# Patient Record
Sex: FEMALE | Race: WHITE | NOT HISPANIC OR LATINO | Employment: FULL TIME | URBAN - METROPOLITAN AREA
[De-identification: names, ages, dates, MRNs, and addresses within clinical notes are randomized per-mention and may not be internally consistent; named-entity substitution may affect disease eponyms.]

---

## 2023-03-09 DIAGNOSIS — I10 PRIMARY HYPERTENSION: Primary | ICD-10-CM

## 2023-03-09 RX ORDER — MECLIZINE HYDROCHLORIDE 25 MG/1
25 TABLET ORAL 2 TIMES DAILY PRN
COMMUNITY
End: 2023-10-04 | Stop reason: ALTCHOICE

## 2023-03-09 RX ORDER — MECLIZINE HCL 12.5 MG 12.5 MG/1
TABLET ORAL
COMMUNITY
Start: 2022-11-11

## 2023-03-09 RX ORDER — SITAGLIPTIN 25 MG/1
25 TABLET, FILM COATED ORAL DAILY
COMMUNITY
End: 2023-04-28

## 2023-03-09 RX ORDER — ESTRADIOL 0.1 MG/G
CREAM VAGINAL
COMMUNITY
End: 2023-05-25 | Stop reason: SDUPTHER

## 2023-03-09 RX ORDER — ATENOLOL 25 MG/1
25 TABLET ORAL 2 TIMES DAILY
Qty: 180 TABLET | Refills: 0 | Status: SHIPPED | OUTPATIENT
Start: 2023-03-09 | End: 2023-08-21 | Stop reason: SDUPTHER

## 2023-03-09 RX ORDER — PREDNISONE 20 MG/1
TABLET ORAL
COMMUNITY
Start: 2022-03-11 | End: 2023-04-28 | Stop reason: ALTCHOICE

## 2023-03-09 RX ORDER — PREDNISONE 10 MG/1
TABLET ORAL
COMMUNITY
Start: 2022-06-24 | End: 2023-04-28 | Stop reason: ALTCHOICE

## 2023-03-09 RX ORDER — DIAZEPAM 5 MG/1
1 TABLET ORAL
COMMUNITY
Start: 2022-06-20 | End: 2023-04-28 | Stop reason: ALTCHOICE

## 2023-03-09 RX ORDER — NYSTATIN AND TRIAMCINOLONE ACETONIDE 100000; 1 [USP'U]/G; MG/G
1 CREAM TOPICAL 3 TIMES DAILY PRN
COMMUNITY
Start: 2015-02-16 | End: 2023-11-16 | Stop reason: SDUPTHER

## 2023-03-09 RX ORDER — BLOOD SUGAR DIAGNOSTIC
STRIP MISCELLANEOUS 2 TIMES DAILY
COMMUNITY
Start: 2020-11-11

## 2023-03-09 RX ORDER — GLYBURIDE 5 MG/1
10 TABLET ORAL 2 TIMES DAILY
COMMUNITY
End: 2023-08-21 | Stop reason: SDUPTHER

## 2023-03-09 RX ORDER — LISINOPRIL 20 MG/1
20 TABLET ORAL DAILY
COMMUNITY
End: 2023-08-22 | Stop reason: SDUPTHER

## 2023-03-09 RX ORDER — TRIAMTERENE/HYDROCHLOROTHIAZID 37.5-25 MG
1 TABLET ORAL DAILY
COMMUNITY
End: 2023-08-21 | Stop reason: SDUPTHER

## 2023-03-09 RX ORDER — METFORMIN HYDROCHLORIDE 500 MG/1
1000 TABLET ORAL 2 TIMES DAILY
COMMUNITY
End: 2023-11-10 | Stop reason: SDUPTHER

## 2023-03-09 RX ORDER — LORAZEPAM 1 MG/1
TABLET ORAL
COMMUNITY
Start: 2021-03-05 | End: 2023-04-28 | Stop reason: SDUPTHER

## 2023-03-09 RX ORDER — ONDANSETRON 4 MG/1
1 TABLET, FILM COATED ORAL EVERY 8 HOURS PRN
COMMUNITY
Start: 2022-11-11 | End: 2024-02-19 | Stop reason: ALTCHOICE

## 2023-03-09 RX ORDER — LEVOTHYROXINE SODIUM 50 UG/1
50 TABLET ORAL DAILY
COMMUNITY
End: 2023-06-22 | Stop reason: SDUPTHER

## 2023-03-09 RX ORDER — CLOBETASOL PROPIONATE 0.5 MG/G
EMULSION TOPICAL
COMMUNITY
Start: 2019-11-05 | End: 2023-10-17 | Stop reason: SDUPTHER

## 2023-03-09 RX ORDER — SCOLOPAMINE TRANSDERMAL SYSTEM 1 MG/1
PATCH, EXTENDED RELEASE TRANSDERMAL
COMMUNITY
Start: 2015-09-01 | End: 2023-10-04 | Stop reason: ALTCHOICE

## 2023-03-09 RX ORDER — ISOPROPYL ALCOHOL 70 ML/100ML
SWAB TOPICAL
COMMUNITY
Start: 2022-12-30 | End: 2023-06-22 | Stop reason: SDUPTHER

## 2023-03-09 RX ORDER — TALC
1 POWDER (GRAM) TOPICAL 2 TIMES DAILY
COMMUNITY
Start: 2022-04-07

## 2023-03-09 RX ORDER — FLUTICASONE PROPIONATE 50 MCG
2 SPRAY, SUSPENSION (ML) NASAL DAILY PRN
COMMUNITY
Start: 2020-11-11

## 2023-03-09 RX ORDER — ATENOLOL 25 MG/1
25 TABLET ORAL 2 TIMES DAILY
COMMUNITY
End: 2023-03-09 | Stop reason: SDUPTHER

## 2023-03-09 RX ORDER — SYRINGE WITH NEEDLE, 1 ML 25GX5/8"
SYRINGE, EMPTY DISPOSABLE MISCELLANEOUS
COMMUNITY
Start: 2023-01-18 | End: 2023-04-18 | Stop reason: SDUPTHER

## 2023-03-09 RX ORDER — CYANOCOBALAMIN 1000 UG/ML
INJECTION INTRAMUSCULAR; SUBCUTANEOUS
COMMUNITY
End: 2023-04-28 | Stop reason: ALTCHOICE

## 2023-03-09 RX ORDER — BLOOD-GLUCOSE METER
EACH MISCELLANEOUS
COMMUNITY
Start: 2016-09-08 | End: 2023-03-27 | Stop reason: SDUPTHER

## 2023-03-09 RX ORDER — ERGOCALCIFEROL 1.25 MG/1
1 CAPSULE ORAL
COMMUNITY
End: 2023-04-25 | Stop reason: SDUPTHER

## 2023-03-09 RX ORDER — LANCETS 33 GAUGE
EACH MISCELLANEOUS
COMMUNITY
Start: 2022-12-30 | End: 2023-03-27 | Stop reason: SDUPTHER

## 2023-03-09 RX ORDER — CYCLOSPORINE 0.5 MG/ML
EMULSION OPHTHALMIC EVERY 12 HOURS
COMMUNITY
Start: 2019-02-18 | End: 2023-10-04 | Stop reason: ALTCHOICE

## 2023-03-27 DIAGNOSIS — E11.69 TYPE 2 DIABETES MELLITUS WITH OTHER SPECIFIED COMPLICATION, UNSPECIFIED WHETHER LONG TERM INSULIN USE (MULTI): Primary | ICD-10-CM

## 2023-03-27 RX ORDER — BLOOD-GLUCOSE METER
EACH MISCELLANEOUS
Qty: 200 STRIP | Refills: 3 | Status: SHIPPED | OUTPATIENT
Start: 2023-03-27 | End: 2024-03-27 | Stop reason: SDUPTHER

## 2023-03-27 RX ORDER — LANCETS 33 GAUGE
EACH MISCELLANEOUS
Qty: 200 EACH | Refills: 3 | Status: SHIPPED | OUTPATIENT
Start: 2023-03-27 | End: 2024-03-25 | Stop reason: SDUPTHER

## 2023-04-18 DIAGNOSIS — E53.8 VITAMIN B12 DEFICIENCY: ICD-10-CM

## 2023-04-18 RX ORDER — SYRINGE WITH NEEDLE, 1 ML 25GX5/8"
SYRINGE, EMPTY DISPOSABLE MISCELLANEOUS
Qty: 4 EACH | Refills: 0 | Status: SHIPPED | OUTPATIENT
Start: 2023-04-18 | End: 2023-10-06 | Stop reason: SDUPTHER

## 2023-04-25 DIAGNOSIS — R79.89 LOW VITAMIN D LEVEL: ICD-10-CM

## 2023-04-25 RX ORDER — ERGOCALCIFEROL 1.25 MG/1
1 CAPSULE ORAL
Qty: 4 CAPSULE | Refills: 0 | Status: SHIPPED | OUTPATIENT
Start: 2023-04-25 | End: 2023-06-28 | Stop reason: SDUPTHER

## 2023-04-28 ENCOUNTER — OFFICE VISIT (OUTPATIENT)
Dept: PRIMARY CARE | Facility: CLINIC | Age: 63
End: 2023-04-28
Payer: COMMERCIAL

## 2023-04-28 ENCOUNTER — LAB (OUTPATIENT)
Dept: LAB | Facility: LAB | Age: 63
End: 2023-04-28
Payer: COMMERCIAL

## 2023-04-28 VITALS
WEIGHT: 231 LBS | BODY MASS INDEX: 38.49 KG/M2 | TEMPERATURE: 97.8 F | DIASTOLIC BLOOD PRESSURE: 90 MMHG | HEIGHT: 65 IN | RESPIRATION RATE: 16 BRPM | SYSTOLIC BLOOD PRESSURE: 158 MMHG

## 2023-04-28 DIAGNOSIS — E03.9 ACQUIRED HYPOTHYROIDISM: ICD-10-CM

## 2023-04-28 DIAGNOSIS — F40.243 FEAR OF FLYING: ICD-10-CM

## 2023-04-28 DIAGNOSIS — Z00.00 ANNUAL PHYSICAL EXAM: ICD-10-CM

## 2023-04-28 DIAGNOSIS — Z00.00 HEALTH MAINTENANCE EXAMINATION: ICD-10-CM

## 2023-04-28 DIAGNOSIS — E55.9 VITAMIN D DEFICIENCY: ICD-10-CM

## 2023-04-28 DIAGNOSIS — E11.9 TYPE 2 DIABETES MELLITUS WITHOUT COMPLICATION, WITHOUT LONG-TERM CURRENT USE OF INSULIN (MULTI): ICD-10-CM

## 2023-04-28 DIAGNOSIS — E78.49 OTHER HYPERLIPIDEMIA: ICD-10-CM

## 2023-04-28 DIAGNOSIS — Z12.31 OTHER SCREENING MAMMOGRAM: ICD-10-CM

## 2023-04-28 DIAGNOSIS — E53.8 VITAMIN B12 DEFICIENCY: ICD-10-CM

## 2023-04-28 DIAGNOSIS — T75.3XXA SEA SICKNESS, INITIAL ENCOUNTER: ICD-10-CM

## 2023-04-28 DIAGNOSIS — E66.09 CLASS 2 OBESITY DUE TO EXCESS CALORIES WITHOUT SERIOUS COMORBIDITY WITH BODY MASS INDEX (BMI) OF 39.0 TO 39.9 IN ADULT: ICD-10-CM

## 2023-04-28 DIAGNOSIS — I10 PRIMARY HYPERTENSION: ICD-10-CM

## 2023-04-28 DIAGNOSIS — Z00.00 ANNUAL PHYSICAL EXAM: Primary | ICD-10-CM

## 2023-04-28 PROBLEM — K76.0 STEATOSIS OF LIVER: Status: ACTIVE | Noted: 2023-04-28

## 2023-04-28 LAB
ALANINE AMINOTRANSFERASE (SGPT) (U/L) IN SER/PLAS: 23 U/L (ref 7–45)
ALBUMIN (G/DL) IN SER/PLAS: 4.1 G/DL (ref 3.4–5)
ALKALINE PHOSPHATASE (U/L) IN SER/PLAS: 70 U/L (ref 33–136)
ANION GAP IN SER/PLAS: 12 MMOL/L (ref 10–20)
ASPARTATE AMINOTRANSFERASE (SGOT) (U/L) IN SER/PLAS: 17 U/L (ref 9–39)
BILIRUBIN TOTAL (MG/DL) IN SER/PLAS: 0.4 MG/DL (ref 0–1.2)
CALCIDIOL (25 OH VITAMIN D3) (NG/ML) IN SER/PLAS: 35 NG/ML
CALCIUM (MG/DL) IN SER/PLAS: 10.5 MG/DL (ref 8.6–10.3)
CARBON DIOXIDE, TOTAL (MMOL/L) IN SER/PLAS: 30 MMOL/L (ref 21–32)
CHLORIDE (MMOL/L) IN SER/PLAS: 102 MMOL/L (ref 98–107)
CHOLESTEROL (MG/DL) IN SER/PLAS: 211 MG/DL (ref 0–199)
CHOLESTEROL IN HDL (MG/DL) IN SER/PLAS: 36.1 MG/DL
CHOLESTEROL/HDL RATIO: 5.8
CREATININE (MG/DL) IN SER/PLAS: 0.87 MG/DL (ref 0.5–1.05)
ESTIMATED AVERAGE GLUCOSE FOR HBA1C: 197 MG/DL
GFR FEMALE: 74 ML/MIN/1.73M2
GLUCOSE (MG/DL) IN SER/PLAS: 165 MG/DL (ref 74–99)
HEMOGLOBIN A1C/HEMOGLOBIN TOTAL IN BLOOD: 8.5 %
LDL: 108 MG/DL (ref 0–99)
NON HDL CHOLESTEROL: 175 MG/DL
POTASSIUM (MMOL/L) IN SER/PLAS: 3.8 MMOL/L (ref 3.5–5.3)
PROTEIN TOTAL: 6.7 G/DL (ref 6.4–8.2)
SODIUM (MMOL/L) IN SER/PLAS: 140 MMOL/L (ref 136–145)
THYROTROPIN (MIU/L) IN SER/PLAS BY DETECTION LIMIT <= 0.05 MIU/L: 1.5 MIU/L (ref 0.44–3.98)
TRIGLYCERIDE (MG/DL) IN SER/PLAS: 335 MG/DL (ref 0–149)
UREA NITROGEN (MG/DL) IN SER/PLAS: 21 MG/DL (ref 6–23)
VLDL: 67 MG/DL (ref 0–40)

## 2023-04-28 PROCEDURE — 80053 COMPREHEN METABOLIC PANEL: CPT

## 2023-04-28 PROCEDURE — 1036F TOBACCO NON-USER: CPT | Performed by: INTERNAL MEDICINE

## 2023-04-28 PROCEDURE — 82306 VITAMIN D 25 HYDROXY: CPT

## 2023-04-28 PROCEDURE — 4010F ACE/ARB THERAPY RXD/TAKEN: CPT | Performed by: INTERNAL MEDICINE

## 2023-04-28 PROCEDURE — 3077F SYST BP >= 140 MM HG: CPT | Performed by: INTERNAL MEDICINE

## 2023-04-28 PROCEDURE — 80061 LIPID PANEL: CPT

## 2023-04-28 PROCEDURE — 3080F DIAST BP >= 90 MM HG: CPT | Performed by: INTERNAL MEDICINE

## 2023-04-28 PROCEDURE — 3052F HG A1C>EQUAL 8.0%<EQUAL 9.0%: CPT | Performed by: INTERNAL MEDICINE

## 2023-04-28 PROCEDURE — 93000 ELECTROCARDIOGRAM COMPLETE: CPT | Performed by: INTERNAL MEDICINE

## 2023-04-28 PROCEDURE — 83036 HEMOGLOBIN GLYCOSYLATED A1C: CPT

## 2023-04-28 PROCEDURE — 3008F BODY MASS INDEX DOCD: CPT | Performed by: INTERNAL MEDICINE

## 2023-04-28 PROCEDURE — 99396 PREV VISIT EST AGE 40-64: CPT | Performed by: INTERNAL MEDICINE

## 2023-04-28 PROCEDURE — 84443 ASSAY THYROID STIM HORMONE: CPT

## 2023-04-28 PROCEDURE — 36415 COLL VENOUS BLD VENIPUNCTURE: CPT

## 2023-04-28 PROCEDURE — 99214 OFFICE O/P EST MOD 30 MIN: CPT | Performed by: INTERNAL MEDICINE

## 2023-04-28 RX ORDER — CYANOCOBALAMIN 1000 UG/ML
INJECTION, SOLUTION INTRAMUSCULAR; SUBCUTANEOUS
Qty: 4 ML | Refills: 1 | Status: SHIPPED | OUTPATIENT
Start: 2023-04-28 | End: 2023-10-06 | Stop reason: SDUPTHER

## 2023-04-28 RX ORDER — SCOLOPAMINE TRANSDERMAL SYSTEM 1 MG/1
1 PATCH, EXTENDED RELEASE TRANSDERMAL
Qty: 4 PATCH | Refills: 0 | Status: SHIPPED | OUTPATIENT
Start: 2023-04-28 | End: 2023-06-27

## 2023-04-28 RX ORDER — LORAZEPAM 1 MG/1
TABLET ORAL
Qty: 2 TABLET | Refills: 0 | Status: SHIPPED | OUTPATIENT
Start: 2023-04-28 | End: 2023-10-04 | Stop reason: ALTCHOICE

## 2023-04-28 ASSESSMENT — PATIENT HEALTH QUESTIONNAIRE - PHQ9
2. FEELING DOWN, DEPRESSED OR HOPELESS: NOT AT ALL
SUM OF ALL RESPONSES TO PHQ9 QUESTIONS 1 AND 2: 0
1. LITTLE INTEREST OR PLEASURE IN DOING THINGS: NOT AT ALL

## 2023-04-28 NOTE — PATIENT INSTRUCTIONS
I spent >15 minutes face to face with individual providing recommendations for nutrition choices and exercise plan to help achieve weight reduction.Go for labs today.  Reduce Metformin down to 2 tab daily,monitor diarrhea,if resolved,most likely Metformin is the cause of your diarrhea.  Start Ozempioc.  Increase Lisinopril up to 1 tab BID.  Refer to our pharmacist for HTN and diabetes management.  Follow up with me in 3 months,will do A1C at our office next visit.

## 2023-04-28 NOTE — PROGRESS NOTES
"Subjective   Patient ID: Shwetha Madrigal is a 63 y.o. female who presents for Annual Exam.    Here for CPE.  She is s/p hysterectomy.  She had covid and has had chronic dizziness,was admitted to [facility],seen by ENT,had vestibular PT,still with balance problems,using a cane for ambulation to prevent fall.  She saw allergist ,referred by [facility],and saw Dr Nunze  [phone]  records reviewed.  she continue to be seen at [facility] .  she has HTN,HLD,DM-2,fatty liver  in past she saw Dr Sierra for abnormal LFT and had fibroscan,had fatty liver,no cirrhosis.next scan in 1 year .  she denies any CP,SOB.  she has h/o tubular adenoma and had colonoscopy by Dr Phipps in 1/21,next in 3 years in 1/2024.  She has not been checking her home BP.  no CP,SOB.  She is going on a cruise,need scopolamine patches.  She is flying and needs 2 tablet Lorazepam.  She gets occasional palpitation.  She has chronic  diarrhea,colonoscopy reviewed. It also could be due to Metfromin use.  Has L cataract,Dr Coburn did eye exam 3/2023,was referred to [facility] by him.                Review of Systems   Reason unable to perform ROS: as HPI.       Objective   /90   Temp 36.6 °C (97.8 °F)   Resp 16   Ht 1.638 m (5' 4.5\")   Wt 105 kg (231 lb)   BMI 39.04 kg/m²     Physical Exam  Constitutional:       Appearance: Normal appearance. She is obese.   HENT:      Head: Normocephalic and atraumatic.      Right Ear: Tympanic membrane, ear canal and external ear normal. There is no impacted cerumen.      Left Ear: Tympanic membrane, ear canal and external ear normal. There is no impacted cerumen.   Eyes:      Extraocular Movements: Extraocular movements intact.      Pupils: Pupils are equal, round, and reactive to light.   Cardiovascular:      Rate and Rhythm: Normal rate and regular rhythm.      Pulses: Normal pulses.      Heart sounds: Normal heart sounds.   Pulmonary:      Effort: Pulmonary effort is normal.      " Breath sounds: Normal breath sounds. No wheezing or rhonchi.   Chest:   Breasts:     Right: Normal. No inverted nipple, mass or skin change.      Left: Normal. No inverted nipple, mass or skin change.   Abdominal:      General: Abdomen is flat. Bowel sounds are normal. There is no distension.      Palpations: Abdomen is soft.   Musculoskeletal:         General: Normal range of motion.      Cervical back: Normal range of motion and neck supple.      Left lower leg: No edema.   Lymphadenopathy:      Upper Body:      Right upper body: No supraclavicular or axillary adenopathy.      Left upper body: No supraclavicular or axillary adenopathy.   Skin:     General: Skin is warm.   Neurological:      General: No focal deficit present.      Mental Status: She is alert and oriented to person, place, and time.   Psychiatric:         Behavior: Behavior normal.      Comments: Concerned,worried about her balance and vertigo.         Assessment/Plan

## 2023-04-29 PROBLEM — E66.09 CLASS 2 OBESITY DUE TO EXCESS CALORIES WITHOUT SERIOUS COMORBIDITY WITH BODY MASS INDEX (BMI) OF 39.0 TO 39.9 IN ADULT: Status: ACTIVE | Noted: 2023-04-29

## 2023-04-29 PROBLEM — E78.49 OTHER HYPERLIPIDEMIA: Status: ACTIVE | Noted: 2023-04-29

## 2023-04-29 PROBLEM — E55.9 VITAMIN D DEFICIENCY: Status: ACTIVE | Noted: 2023-04-29

## 2023-04-29 PROBLEM — E66.812 CLASS 2 OBESITY DUE TO EXCESS CALORIES WITHOUT SERIOUS COMORBIDITY WITH BODY MASS INDEX (BMI) OF 39.0 TO 39.9 IN ADULT: Status: ACTIVE | Noted: 2023-04-29

## 2023-04-29 PROBLEM — Z12.31 OTHER SCREENING MAMMOGRAM: Status: ACTIVE | Noted: 2023-04-29

## 2023-04-29 PROBLEM — I10 PRIMARY HYPERTENSION: Status: ACTIVE | Noted: 2023-04-29

## 2023-04-29 PROBLEM — T75.3XXA MAL DE MER: Status: ACTIVE | Noted: 2023-04-29

## 2023-04-29 PROBLEM — F40.243 FEAR OF FLYING: Status: ACTIVE | Noted: 2023-04-29

## 2023-04-29 PROBLEM — E03.9 ACQUIRED HYPOTHYROIDISM: Status: ACTIVE | Noted: 2023-04-29

## 2023-04-30 NOTE — RESULT ENCOUNTER NOTE
A1C 8.5,better than before,but still not at goal,Triglycerides are elevated and HDL cholesterol low,takes medications as discussed at recent visit,follow strict 1800 calories ADA diet.

## 2023-05-25 DIAGNOSIS — N95.1 POSTMENOPAUSAL DISORDER: ICD-10-CM

## 2023-05-25 RX ORDER — ESTRADIOL 0.1 MG/G
CREAM VAGINAL
Qty: 42.5 G | Refills: 3 | Status: SHIPPED | OUTPATIENT
Start: 2023-05-25

## 2023-06-22 DIAGNOSIS — E03.9 ACQUIRED HYPOTHYROIDISM: ICD-10-CM

## 2023-06-22 DIAGNOSIS — E11.9 TYPE 2 DIABETES MELLITUS WITHOUT COMPLICATION, WITHOUT LONG-TERM CURRENT USE OF INSULIN (MULTI): ICD-10-CM

## 2023-06-22 RX ORDER — ISOPROPYL ALCOHOL 70 ML/100ML
SWAB TOPICAL
Qty: 100 EACH | Refills: 1 | Status: SHIPPED | OUTPATIENT
Start: 2023-06-22

## 2023-06-22 RX ORDER — LEVOTHYROXINE SODIUM 50 UG/1
50 TABLET ORAL DAILY
Qty: 90 TABLET | Refills: 3 | Status: SHIPPED | OUTPATIENT
Start: 2023-06-22 | End: 2024-02-20 | Stop reason: SDUPTHER

## 2023-06-28 DIAGNOSIS — R79.89 LOW VITAMIN D LEVEL: ICD-10-CM

## 2023-06-28 RX ORDER — ERGOCALCIFEROL 1.25 MG/1
1 CAPSULE ORAL
Qty: 4 CAPSULE | Refills: 3 | Status: SHIPPED | OUTPATIENT
Start: 2023-06-28 | End: 2023-10-19 | Stop reason: SDUPTHER

## 2023-07-19 PROBLEM — H61.22 IMPACTED CERUMEN OF LEFT EAR: Status: ACTIVE | Noted: 2023-07-19

## 2023-07-19 PROBLEM — U07.1 COVID-19 VIRUS INFECTION: Status: ACTIVE | Noted: 2023-07-19

## 2023-07-19 PROBLEM — E83.52 HYPERCALCEMIA: Status: ACTIVE | Noted: 2023-07-19

## 2023-07-19 PROBLEM — M47.816 DJD (DEGENERATIVE JOINT DISEASE), LUMBAR: Status: ACTIVE | Noted: 2023-07-19

## 2023-07-19 PROBLEM — B37.31 VAGINAL CANDIDIASIS: Status: ACTIVE | Noted: 2023-07-19

## 2023-07-19 PROBLEM — R10.30 LOWER ABDOMINAL PAIN: Status: ACTIVE | Noted: 2023-07-19

## 2023-07-19 PROBLEM — N28.1 RENAL CYST: Status: ACTIVE | Noted: 2023-07-19

## 2023-07-19 PROBLEM — M79.10 MYALGIA: Status: ACTIVE | Noted: 2023-07-19

## 2023-07-19 PROBLEM — R79.89 LOW VITAMIN D LEVEL: Status: ACTIVE | Noted: 2023-07-19

## 2023-07-19 PROBLEM — L43.9 LICHEN PLANUS: Status: ACTIVE | Noted: 2023-07-19

## 2023-07-19 PROBLEM — T75.3XXA MOTION SICKNESS: Status: ACTIVE | Noted: 2023-07-19

## 2023-07-19 PROBLEM — T75.3XXA SEASICKNESS: Status: ACTIVE | Noted: 2023-07-19

## 2023-07-19 PROBLEM — U09.9 POST-COVID-19 CONDITION: Status: ACTIVE | Noted: 2023-07-19

## 2023-07-19 PROBLEM — D64.9 ABSOLUTE ANEMIA: Status: ACTIVE | Noted: 2023-07-19

## 2023-07-19 PROBLEM — K57.30 DIVERTICULOSIS OF COLON: Status: ACTIVE | Noted: 2023-07-19

## 2023-07-19 PROBLEM — N39.0 UTI (URINARY TRACT INFECTION): Status: ACTIVE | Noted: 2023-07-19

## 2023-07-19 PROBLEM — E21.3 HYPERPARATHYROIDISM (MULTI): Status: ACTIVE | Noted: 2023-07-19

## 2023-07-19 PROBLEM — M62.81 MUSCLE WEAKNESS: Status: ACTIVE | Noted: 2023-07-19

## 2023-07-19 PROBLEM — R39.15 URINARY URGENCY: Status: ACTIVE | Noted: 2023-07-19

## 2023-07-19 PROBLEM — R10.11 RIGHT UPPER QUADRANT PAIN: Status: ACTIVE | Noted: 2023-07-19

## 2023-07-19 PROBLEM — L03.90 CELLULITIS: Status: ACTIVE | Noted: 2023-07-19

## 2023-07-19 PROBLEM — L73.9 FOLLICULITIS OF AXILLA: Status: ACTIVE | Noted: 2023-07-19

## 2023-07-19 PROBLEM — K21.9 ESOPHAGEAL REFLUX: Status: ACTIVE | Noted: 2023-07-19

## 2023-07-19 PROBLEM — G47.33 OBSTRUCTIVE SLEEP APNEA: Status: ACTIVE | Noted: 2023-07-19

## 2023-07-19 PROBLEM — I65.29 CAROTID ATHEROSCLEROSIS: Status: ACTIVE | Noted: 2023-07-19

## 2023-07-19 PROBLEM — L65.9 HAIR THINNING: Status: ACTIVE | Noted: 2023-07-19

## 2023-07-19 PROBLEM — H81.13 BENIGN PAROXYSMAL POSITIONAL VERTIGO DUE TO BILATERAL VESTIBULAR DISORDER: Status: ACTIVE | Noted: 2023-07-19

## 2023-07-19 PROBLEM — R26.81 UNSTEADY GAIT: Status: ACTIVE | Noted: 2023-07-19

## 2023-07-19 PROBLEM — R26.89 IMPAIRMENT OF BALANCE: Status: ACTIVE | Noted: 2023-07-19

## 2023-07-19 PROBLEM — H69.92 DYSFUNCTION OF LEFT EUSTACHIAN TUBE: Status: ACTIVE | Noted: 2023-07-19

## 2023-07-19 PROBLEM — R32 URINARY INCONTINENCE: Status: ACTIVE | Noted: 2023-07-19

## 2023-07-19 PROBLEM — M79.673 HEEL PAIN: Status: ACTIVE | Noted: 2023-07-19

## 2023-07-19 PROBLEM — R42 DIZZINESS: Status: ACTIVE | Noted: 2023-07-19

## 2023-07-19 PROBLEM — D32.9 MENINGIOMA (MULTI): Status: ACTIVE | Noted: 2022-06-18

## 2023-07-19 PROBLEM — E11.9 TYPE 2 DIABETES MELLITUS (MULTI): Status: ACTIVE | Noted: 2023-07-19

## 2023-07-19 PROBLEM — J30.9 ALLERGIC RHINITIS: Status: ACTIVE | Noted: 2023-07-19

## 2023-07-19 PROBLEM — R31.0 GROSS HEMATURIA: Status: ACTIVE | Noted: 2023-07-19

## 2023-07-19 PROBLEM — D58.2 ELEVATED HEMOGLOBIN (CMS-HCC): Status: ACTIVE | Noted: 2023-07-19

## 2023-07-19 PROBLEM — H83.09 LABYRINTHITIS: Status: ACTIVE | Noted: 2023-07-19

## 2023-07-19 PROBLEM — R11.0 NAUSEA: Status: ACTIVE | Noted: 2023-07-19

## 2023-07-19 PROBLEM — N95.1 POSTMENOPAUSAL DISORDER: Status: ACTIVE | Noted: 2023-07-19

## 2023-07-19 PROBLEM — K63.5 BENIGN COLON POLYP: Status: ACTIVE | Noted: 2023-07-19

## 2023-07-19 PROBLEM — L98.9 SKIN LESION: Status: ACTIVE | Noted: 2023-07-19

## 2023-07-19 PROBLEM — M76.60 ACHILLES TENDINITIS: Status: ACTIVE | Noted: 2023-07-19

## 2023-07-19 PROBLEM — E83.42 HYPOMAGNESEMIA: Status: ACTIVE | Noted: 2023-07-19

## 2023-07-19 PROBLEM — L85.3 DRY SKIN: Status: ACTIVE | Noted: 2023-07-19

## 2023-07-19 PROBLEM — R21 RASH: Status: ACTIVE | Noted: 2023-07-19

## 2023-07-19 PROBLEM — K57.90 DIVERTICULOSIS: Status: ACTIVE | Noted: 2023-07-19

## 2023-07-19 PROBLEM — L50.9 HIVES: Status: ACTIVE | Noted: 2023-07-19

## 2023-07-19 PROBLEM — D12.6 TUBULAR ADENOMA OF COLON: Status: ACTIVE | Noted: 2023-07-19

## 2023-07-19 PROBLEM — Z98.890 POST-OPERATIVE STATE: Status: ACTIVE | Noted: 2023-07-19

## 2023-07-19 PROBLEM — F40.240 CLAUSTROPHOBIA: Status: ACTIVE | Noted: 2023-07-19

## 2023-08-02 ENCOUNTER — APPOINTMENT (OUTPATIENT)
Dept: PRIMARY CARE | Facility: CLINIC | Age: 63
End: 2023-08-02
Payer: COMMERCIAL

## 2023-08-21 DIAGNOSIS — E11.9 TYPE 2 DIABETES MELLITUS WITHOUT COMPLICATION, WITHOUT LONG-TERM CURRENT USE OF INSULIN (MULTI): Primary | ICD-10-CM

## 2023-08-21 DIAGNOSIS — I10 PRIMARY HYPERTENSION: ICD-10-CM

## 2023-08-21 RX ORDER — GLYBURIDE 5 MG/1
10 TABLET ORAL 2 TIMES DAILY
Qty: 180 TABLET | Refills: 2 | Status: SHIPPED | OUTPATIENT
Start: 2023-08-21 | End: 2023-08-23 | Stop reason: SDUPTHER

## 2023-08-21 RX ORDER — ATENOLOL 25 MG/1
25 TABLET ORAL 2 TIMES DAILY
Qty: 180 TABLET | Refills: 2 | Status: SHIPPED | OUTPATIENT
Start: 2023-08-21 | End: 2023-09-01 | Stop reason: DRUGHIGH

## 2023-08-21 RX ORDER — TRIAMTERENE/HYDROCHLOROTHIAZID 37.5-25 MG
1 TABLET ORAL DAILY
Qty: 90 TABLET | Refills: 2 | Status: SHIPPED | OUTPATIENT
Start: 2023-08-21 | End: 2024-05-20 | Stop reason: SDUPTHER

## 2023-08-22 DIAGNOSIS — I10 BENIGN ESSENTIAL HYPERTENSION: ICD-10-CM

## 2023-08-22 RX ORDER — LISINOPRIL 20 MG/1
20 TABLET ORAL DAILY
Qty: 90 TABLET | Refills: 3 | Status: SHIPPED | OUTPATIENT
Start: 2023-08-22 | End: 2024-02-08 | Stop reason: SDUPTHER

## 2023-08-22 NOTE — TELEPHONE ENCOUNTER
Patient's pharmacy is calling on behalf of patient and says that they received an electronic rx for Glyburide, but they need the quantity changed to 360 in order for this to be a 90 day supply. Please resend.

## 2023-08-23 DIAGNOSIS — E11.9 TYPE 2 DIABETES MELLITUS WITHOUT COMPLICATION, WITHOUT LONG-TERM CURRENT USE OF INSULIN (MULTI): ICD-10-CM

## 2023-08-23 RX ORDER — GLYBURIDE 5 MG/1
10 TABLET ORAL 2 TIMES DAILY
Qty: 360 TABLET | Refills: 2 | Status: SHIPPED | OUTPATIENT
Start: 2023-08-23 | End: 2024-05-13 | Stop reason: SDUPTHER

## 2023-08-28 PROBLEM — M25.519 PAIN IN JOINT, SHOULDER REGION: Status: ACTIVE | Noted: 2023-08-28

## 2023-08-28 PROBLEM — D48.5 NEOPLASM OF UNCERTAIN BEHAVIOR OF SKIN: Status: ACTIVE | Noted: 2022-09-02

## 2023-08-28 PROBLEM — D22.39 MELANOCYTIC NEVI OF OTHER PARTS OF FACE: Status: ACTIVE | Noted: 2022-09-02

## 2023-08-28 PROBLEM — L82.0 INFLAMED SEBORRHEIC KERATOSIS: Status: ACTIVE | Noted: 2022-09-02

## 2023-08-28 RX ORDER — MOXIFLOXACIN 5 MG/ML
SOLUTION/ DROPS OPHTHALMIC
COMMUNITY
Start: 2023-08-02 | End: 2024-03-08 | Stop reason: ALTCHOICE

## 2023-08-28 RX ORDER — PREDNISOLONE ACETATE 10 MG/ML
SUSPENSION/ DROPS OPHTHALMIC
COMMUNITY
Start: 2023-08-02 | End: 2024-02-19 | Stop reason: ALTCHOICE

## 2023-08-28 RX ORDER — BROMFENAC SODIUM 0.7 MG/ML
1 SOLUTION/ DROPS OPHTHALMIC
COMMUNITY
Start: 2023-08-02

## 2023-09-01 ENCOUNTER — TELEPHONE (OUTPATIENT)
Dept: PRIMARY CARE | Facility: CLINIC | Age: 63
End: 2023-09-01

## 2023-09-01 ENCOUNTER — OFFICE VISIT (OUTPATIENT)
Dept: PRIMARY CARE | Facility: CLINIC | Age: 63
End: 2023-09-01
Payer: COMMERCIAL

## 2023-09-01 VITALS
HEIGHT: 65 IN | SYSTOLIC BLOOD PRESSURE: 176 MMHG | WEIGHT: 228.8 LBS | TEMPERATURE: 97.3 F | BODY MASS INDEX: 38.12 KG/M2 | HEART RATE: 69 BPM | DIASTOLIC BLOOD PRESSURE: 93 MMHG | OXYGEN SATURATION: 96 %

## 2023-09-01 DIAGNOSIS — M25.511 ACUTE PAIN OF RIGHT SHOULDER: Primary | ICD-10-CM

## 2023-09-01 DIAGNOSIS — E21.3 HYPERPARATHYROIDISM (MULTI): ICD-10-CM

## 2023-09-01 DIAGNOSIS — D32.9 MENINGIOMA (MULTI): ICD-10-CM

## 2023-09-01 DIAGNOSIS — I10 PRIMARY HYPERTENSION: ICD-10-CM

## 2023-09-01 DIAGNOSIS — E55.9 VITAMIN D DEFICIENCY: ICD-10-CM

## 2023-09-01 DIAGNOSIS — E53.8 VITAMIN B12 DEFICIENCY: ICD-10-CM

## 2023-09-01 DIAGNOSIS — E10.9 TYPE 1 DIABETES MELLITUS WITHOUT COMPLICATION (MULTI): ICD-10-CM

## 2023-09-01 DIAGNOSIS — I10 BENIGN ESSENTIAL HYPERTENSION: ICD-10-CM

## 2023-09-01 DIAGNOSIS — E11.9 TYPE 2 DIABETES MELLITUS WITHOUT COMPLICATION, WITHOUT LONG-TERM CURRENT USE OF INSULIN (MULTI): ICD-10-CM

## 2023-09-01 DIAGNOSIS — E11.65 UNCONTROLLED TYPE 2 DIABETES MELLITUS WITH HYPERGLYCEMIA (MULTI): ICD-10-CM

## 2023-09-01 DIAGNOSIS — K76.0 STEATOSIS OF LIVER: ICD-10-CM

## 2023-09-01 DIAGNOSIS — E66.09 CLASS 2 OBESITY DUE TO EXCESS CALORIES WITHOUT SERIOUS COMORBIDITY WITH BODY MASS INDEX (BMI) OF 39.0 TO 39.9 IN ADULT: ICD-10-CM

## 2023-09-01 LAB — POC HEMOGLOBIN A1C: 9.7 % (ref 4.2–6.5)

## 2023-09-01 PROCEDURE — 3052F HG A1C>EQUAL 8.0%<EQUAL 9.0%: CPT | Performed by: INTERNAL MEDICINE

## 2023-09-01 PROCEDURE — 3077F SYST BP >= 140 MM HG: CPT | Performed by: INTERNAL MEDICINE

## 2023-09-01 PROCEDURE — 4010F ACE/ARB THERAPY RXD/TAKEN: CPT | Performed by: INTERNAL MEDICINE

## 2023-09-01 PROCEDURE — 83036 HEMOGLOBIN GLYCOSYLATED A1C: CPT | Performed by: INTERNAL MEDICINE

## 2023-09-01 PROCEDURE — 99214 OFFICE O/P EST MOD 30 MIN: CPT | Performed by: INTERNAL MEDICINE

## 2023-09-01 PROCEDURE — 3080F DIAST BP >= 90 MM HG: CPT | Performed by: INTERNAL MEDICINE

## 2023-09-01 PROCEDURE — 1036F TOBACCO NON-USER: CPT | Performed by: INTERNAL MEDICINE

## 2023-09-01 PROCEDURE — 3008F BODY MASS INDEX DOCD: CPT | Performed by: INTERNAL MEDICINE

## 2023-09-01 RX ORDER — TRAMADOL HYDROCHLORIDE 50 MG/1
50 TABLET ORAL EVERY 6 HOURS PRN
Qty: 15 TABLET | Refills: 0 | Status: SHIPPED | OUTPATIENT
Start: 2023-09-01 | End: 2023-09-08

## 2023-09-01 RX ORDER — ATENOLOL 25 MG/1
TABLET ORAL
Qty: 270 TABLET | Refills: 3 | Status: SHIPPED | OUTPATIENT
Start: 2023-09-01 | End: 2024-02-20 | Stop reason: SDUPTHER

## 2023-09-01 RX ORDER — MELOXICAM 15 MG/1
15 TABLET ORAL DAILY
Qty: 30 TABLET | Refills: 11 | Status: SHIPPED | OUTPATIENT
Start: 2023-09-01 | End: 2023-10-04 | Stop reason: ALTCHOICE

## 2023-09-01 NOTE — PROGRESS NOTES
"Subjective   Patient ID: Shwetha Madrigal is a 63 y.o. female who presents for Hospital follow up from [facility]  , Pain radiating down arm and shoulder , Elevated bp readings , and Urgent care follow up COVID  .    Pt is here to follow up from recent ER visit  of R shoulder pain for 2 weeks with radiation to R elbow and neck,was given shot of Toradol without relief.  No fall or trauma.  She had covid recently,seen at .  She has medical statement for me to complete for her FMLA,need sto take time off for her medical appointment and tests.  She has HTN,DM-2,HLD,fatty liver.         Review of Systems   Respiratory:  Negative for cough.    Cardiovascular:  Negative for chest pain.   Musculoskeletal:         As HPI.       Objective   BP (!) 176/93 (BP Location: Left arm, Patient Position: Sitting, BP Cuff Size: Large adult)   Pulse 69   Temp 36.3 °C (97.3 °F) (Temporal)   Ht 1.638 m (5' 4.5\")   Wt 104 kg (228 lb 12.8 oz)   SpO2 96%   BMI 38.67 kg/m²     Physical Exam  Constitutional:       Appearance: Normal appearance.   HENT:      Head: Normocephalic and atraumatic.   Eyes:      Extraocular Movements: Extraocular movements intact.      Pupils: Pupils are equal, round, and reactive to light.   Cardiovascular:      Rate and Rhythm: Normal rate and regular rhythm.      Heart sounds: Normal heart sounds.   Pulmonary:      Effort: Pulmonary effort is normal.      Breath sounds: Normal breath sounds. No wheezing or rhonchi.   Abdominal:      General: Abdomen is flat. Bowel sounds are normal. There is no distension.      Palpations: Abdomen is soft.   Musculoskeletal:      Cervical back: Normal range of motion and neck supple.      Right lower leg: No edema.      Left lower leg: No edema.      Comments: Right shoulder,limited ROM at 20 deprees due to pain.  Neck fair ROM,no reproducible right arm pain with moving her head.   Skin:     General: Skin is warm.   Neurological:      General: No focal deficit present.      " Mental Status: She is alert and oriented to person, place, and time.   Psychiatric:         Mood and Affect: Mood normal.         Behavior: Behavior normal.         Assessment/Plan   Problem List Items Addressed This Visit       Vitamin B12 deficiency    Type 2 diabetes mellitus without complication, without long-term current use of insulin (CMS/[facility])     Poorly controlled.  Refer to our [facility] pharmacy to start Ozempic.         Steatosis of liver    Vitamin D deficiency    Class 2 obesity due to excess calories without serious comorbidity with body mass index (BMI) of 39.0 to 39.9 in adult     I spent >15 minutes face to face with individual providing recommendations for nutrition choices and exercise plan to help achieve weight reduction.  Refer to our pharmacist to start Ozempic to also help with better diabetic control.         Primary hypertension    Relevant Medications    atenolol (Tenormin) 25 mg tablet    Other Relevant Orders    Follow Up In [facility] - Pharmacy    Hyperparathyroidism (CMS/[facility])    Meningioma (CMS/[facility])    Benign essential hypertension     BP not well controlled,will increase Atenolol up to total of 75 mg daily(2 tab in AN,1 tab in PM).  Follow up with our pharmacist.  She did not tolerate Amlodipine in past due to edema.  Reinforce low sodium diet and weight reduction.           Acute pain of right shoulder - Primary    Relevant Medications    traMADol (Ultram) 50 mg tablet    meloxicam (Mobic) 15 mg tablet    Other Relevant Orders    Referral to Orthopaedic Surgery     Other Visit Diagnoses       Uncontrolled type 2 diabetes mellitus with hyperglycemia (CMS/[facility])        Relevant Orders    Follow Up In [facility] - Pharmacy    POCT glycosylated hemoglobin (Hb A1C) manually resulted (Completed)    Type 1 diabetes mellitus without complication (CMS/[facility])

## 2023-09-01 NOTE — TELEPHONE ENCOUNTER
Patient saw Dr Kenyon today and referred to see you for hypertension and diabetes     Please call patient to schedule appt.

## 2023-09-04 ASSESSMENT — ENCOUNTER SYMPTOMS: COUGH: 0

## 2023-09-04 NOTE — ASSESSMENT & PLAN NOTE
I spent >15 minutes face to face with individual providing recommendations for nutrition choices and exercise plan to help achieve weight reduction.  Refer to our pharmacist to start Ozempic to also help with better diabetic control.

## 2023-09-04 NOTE — ASSESSMENT & PLAN NOTE
BP not well controlled,will increase Atenolol up to total of 75 mg daily(2 tab in AN,1 tab in PM).  Follow up with our pharmacist.  She did not tolerate Amlodipine in past due to edema.  Reinforce low sodium diet and weight reduction.

## 2023-09-23 LAB — URINE CULTURE: ABNORMAL

## 2023-10-04 ENCOUNTER — CLINICAL SUPPORT (OUTPATIENT)
Dept: PRIMARY CARE | Facility: CLINIC | Age: 63
End: 2023-10-04
Payer: COMMERCIAL

## 2023-10-04 ENCOUNTER — PHARMACY VISIT (OUTPATIENT)
Dept: PHARMACY | Facility: CLINIC | Age: 63
End: 2023-10-04
Payer: COMMERCIAL

## 2023-10-04 VITALS
DIASTOLIC BLOOD PRESSURE: 91 MMHG | SYSTOLIC BLOOD PRESSURE: 172 MMHG | WEIGHT: 226.2 LBS | BODY MASS INDEX: 38.23 KG/M2 | HEART RATE: 64 BPM

## 2023-10-04 DIAGNOSIS — E11.9 TYPE 2 DIABETES MELLITUS WITHOUT COMPLICATION, WITHOUT LONG-TERM CURRENT USE OF INSULIN (MULTI): ICD-10-CM

## 2023-10-04 DIAGNOSIS — I10 PRIMARY HYPERTENSION: ICD-10-CM

## 2023-10-04 DIAGNOSIS — E11.65 UNCONTROLLED TYPE 2 DIABETES MELLITUS WITH HYPERGLYCEMIA (MULTI): Primary | ICD-10-CM

## 2023-10-04 PROCEDURE — RXMED WILLOW AMBULATORY MEDICATION CHARGE

## 2023-10-04 RX ORDER — BLOOD-GLUCOSE SENSOR
EACH MISCELLANEOUS
Qty: 2 EACH | Refills: 3 | Status: SHIPPED | OUTPATIENT
Start: 2023-10-04 | End: 2023-11-01 | Stop reason: SDUPTHER

## 2023-10-04 RX ORDER — BLOOD-GLUCOSE SENSOR
EACH MISCELLANEOUS
Qty: 2 EACH | Refills: 3 | Status: SHIPPED | OUTPATIENT
Start: 2023-10-04 | End: 2023-10-04 | Stop reason: SDUPTHER

## 2023-10-04 RX ORDER — BLOOD-GLUCOSE SENSOR
EACH MISCELLANEOUS
Qty: 1 EACH | Refills: 0 | Status: SHIPPED | OUTPATIENT
Start: 2023-10-04 | End: 2023-11-01 | Stop reason: ALTCHOICE

## 2023-10-04 ASSESSMENT — ENCOUNTER SYMPTOMS
POLYDIPSIA: 1
SWEATS: 1
TREMORS: 1
NERVOUS/ANXIOUS: 1
NECK PAIN: 0
HYPERTENSION: 1
HEADACHES: 0
BLURRED VISION: 0

## 2023-10-04 NOTE — ASSESSMENT & PLAN NOTE
Shwetha Madrigal has uncontrolled type 2 diabetes complicated by hypertension, dyslipidemia, and obesity as evidenced by her most recent A1c of 9.7% on 09/01/23 which had increased from 8.5% on 04/28/23. She has not been checking her blood sugars at home due to her monitor needing the batteries replaced.   Patient is hesitant to start any new medications. We discussed options for additional medication therapy including GLP-1 agonists including Rybelsus and SGLT2 inhibitors. Patient is not interesting in starting a GLP-1 agonist at this time, she was informed that this would be the most effective option for her blood sugar and would give her the benefit of weight loss in addition to lowering blood sugars. She does seem more open to starting an SGLT2 inhibitor however would like to continue working on lifestyle changes to help her blood sugars. We discussed that an A1c of >10% usually indicates that a patient may need insulin therapy.   Patient does report that she has made drastic changes to her diet since she had her A1c checked in September and has not been checking her blood sugar at home. Today she was set up with the [employer] Codi 3 CGM system to monitor her blood sugar and was connected to my account. The first sensor was placed before she left today. In 4 weeks she will come in and we will go over her Codi report together. At that time patient is aware that she will have to start an additional medication if her blood sugars are not improving enough.   Patient was interested in meeting with a dietician, a referral to nutrition services was placed and patient was given the phone number to call and schedule with them.   CONTINUE:   Glyburide 2.5 mg 2 tablets by mouth twice daily   Metformin 1000 mg 1 tablet by mouth twice daily   Patient was encouraged to get the yearly influenza vaccine this season  Future consideration:   We will likely start Jardiance 10 mg once daily or Mounjaro 2.5 mg once weekly at  next appointment   Both of these medications are covered for $0 under patient's insurance  Education provided to patient: blood sugar goals, medication mechanism of action and side effects  Compliance at present is estimated to be excellent.

## 2023-10-04 NOTE — PROGRESS NOTES
Patient is sent at the request of Whit Kenyon MD for my opinion regarding Type 2 diabetes and hypertension.  My final recommendations will be communicated back to the requesting provider by way of shared medical record.    Subjective     Diabetes  She presents for her initial diabetic visit. She has type 2 diabetes mellitus. The initial diagnosis of diabetes was made 16 years ago. Hypoglycemia symptoms include nervousness/anxiousness, sweats and tremors. Pertinent negatives for hypoglycemia include no headaches. (Experiences symptoms of hypoglycemia when she skips dinner) Associated symptoms include polydipsia and polyuria. Pertinent negatives for diabetes include no blurred vision and no chest pain. (Notices more symptoms when she eats higher carbohydrates) There are no hypoglycemic complications. Risk factors for coronary artery disease include diabetes mellitus, dyslipidemia, hypertension and obesity. Current diabetic treatment includes oral agent (dual therapy). She is compliant with treatment all of the time. She is following a generally healthy diet. She rarely participates in exercise. An ACE inhibitor/angiotensin II receptor blocker is being taken. She does not see a podiatrist.Eye exam is current.   Hypertension  This is a chronic problem. The current episode started more than 1 year ago. The problem is unchanged. The problem is uncontrolled. Associated symptoms include sweats. Pertinent negatives include no blurred vision, chest pain, headaches or neck pain. Agents associated with hypertension include estrogens, thyroid hormones and NSAIDs. Risk factors for coronary artery disease include diabetes mellitus, dyslipidemia and obesity. The current treatment provides mild improvement. There are no compliance problems.  Identifiable causes of hypertension include a hypertension causing med and a thyroid problem.     Current diet:   Trying to eat plant based with no processed foods   Breakfast: takes some  pretzels with her medications in the morning, coffee with half and half creamer, low sugar instant oatmeal at work, will also usually have a banana   Wants to try to eat an egg in the morning with her medications instead of pretzels  Lunch: tries to stick to salads with some protein, tries not to use bottled dressing, packs her lunch   Dinner: trying to eat salads as much as she can, will sometimes have sausage with them, kielbasa and sour kraut with mashed potatoes  Snacks: does snack on pretzels in between meals, avoids sweets, sometimes crackers with spread-able cheese  Fluids: water, hot tea  Does not eat fast food or out at restaurants    Current exercise:   Has 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 which limits her ability to exercise and walk throughout the day     Sodium intake:   Sometimes makes soups with canned black beans and green beans and other vegetables  Snacks on pretzels and crackers  Does not add salt to meals, will sometimes add salt to meals when cooking     Allergies   Allergen Reactions    Doxycycline Unknown    Sulfamethoxazole-Trimethoprim Hives    Cephalexin Itching and Rash     Pruritus    Penicillins Headache, Unknown and Rash     rash    headache    rash   headache     Hypertension:    Blood Pressure Monitor Device: Omron purchased from our [facility] ~1 month ago    Affordability/Accessibility: No issues   Adherence/Organization: Uses a 2-week pill box   Did you take your antihypertensive medications this morning: Yes  Time of Administration of Antihypertensive(s):  0500  Have you missed any doses of your antihypertensives? No  Adverse Effects: Reports no issues     Patient mostly checks her blood pressures once she gets to work in the morning after drinking coffee   SMBP Measurements   Date/Time Systolic Diastolic Heart Rate   09/05/23 143 89 58   09/06/23 (AM) 126 79 59   09/06/23 (PM) 134 82 64   09/07/23 142 82 64   09/08/23 (AM) 148 79 68   09/08/23 (PM) 144 82 71   09/11/23 143  87 59   23 149 89 60   23 127 80 56   23 134 81 68   09/15/23 147 83 56   23 139 92 59   23 144 88 64   23 132 76 64   23 136 82 64   23 145 82 67   10/02/23 137 86 65     ASSESSMENT OF SMBP MEASUREMENTS  Highest readin/79 mmHg  Lowest readin/79  mmHg  Average: 139/83 mmHg    Has the patient experienced any low blood pressures since last contact? No  denies symptoms of low blood pressure: lightheadedness, dizziness, falls, blurry vision, clammy/pale skin   Has the patient experienced any high blood pressures since last contact? No  denies symptoms of high blood pressure: headache, chest pain, vision problems    Diabetes    When A1c was 9.8% in January she states she was on a lot of cruises not watching what she eat. She was able to get it improved to 8.5% on 23 however it recently jumped back up to 9.7% on 23.     The patient does have a known family history of diabetes (mother)     Patient is using: glucometer  The patient has not been checking her blood sugar at home due to glucometer due to the batteries needing replaced    Hypoglycemia frequency: will get low in the morning when she skips dinner, she states that she does get symptoms such as sweatiness, anxiousness/nervousness, and shakiness   Hypoglycemia awareness: Yes     Objective     BP (!) 172/91 (BP Location: Left arm, Patient Position: Sitting, BP Cuff Size: Adult)   Pulse 64   Wt 103 kg (226 lb 3.2 oz)   BMI 38.23 kg/m²     Diabetes Pharmacotherapy:  Glyburide 5 mg 2 tablets twice daily   Metformin 1000 mg twice daily     Historical Diabetes Pharmacotherapy:   Januvia 25 mg     Hypertension Pharmacotherapy:  Atenolol 25 mg 2 tablets in the morning and 1 tablet in the evening   Lisinopril 20 mg once daily   Triamterene-hydrochlorothiazide 37.5-25 mg once daily     Historical Hypertension Pharmacotherapy:   Amlodipine (did not tolerate due to edema)    SECONDARY PREVENTION  Statin?  No  ACE-I/ARB? No  Aspirin? No    Pertinent PMH Review:  PMH of Pancreatitis: No  PMH of Retinopathy: No  PMH of Urinary Tract Infections: Yes, does not get frequently   PMH of MTC: No    Medication Reconciliation  Removed:  Meloxicam (patient stopped on her own due to warning of cardiac events)  Cyclosporine opth emulsion (patient reports no longer using)  Lorazepam 1 mg (patient states she only uses when flying and she does not have any at home currently)  Meclizine 25 mg (patient keeps the 12.5 mg tablets at home)  Scopolamine (patient reports no longer using)    Current Outpatient Medications on File Prior to Visit   Medication Sig Dispense Refill    alcohol swabs pads, medicated Use as needed. 100 each 1    atenolol (Tenormin) 25 mg tablet Take 2 tab in AM and 1 tab in PM. (Patient taking differently: Take 1 tablet (25 mg) by mouth 2 times a day. Take 2 tab in AM and 1 tab in PM.) 270 tablet 3    blood sugar diagnostic (Contour Next Test Strips) strip twice a day.      clobetasoL-emollient 0.05 % cream APPY AND GENTLY MASSAGE INTO AFFECTED AREA(S) TWICE DAILY AS NEEDED FOR ITCHING      cyanocobalamin (Vitamin B-12) 1,000 mcg/mL injection Inject 1,000 mcg every 2 weeks for 8 weeks then once a month. 4 mL 1    ergocalciferol (Vitamin D-2) 1.25 MG (88047 UT) capsule Take 1 capsule (1,250 mcg) by mouth 1 (one) time per week. 4 capsule 3    estradiol (Estrace) 0.1 MG/GM vaginal cream APPLY 0.5 GRAMS VAGINALLY WEEKLY AS DIRECTED 42.5 g 3    fluticasone (Flonase) 50 mcg/actuation nasal spray Administer 2 sprays into affected nostril(s) once daily as needed.      glyBURIDE (Diabeta) 5 mg tablet Take 2 tablets (10 mg) by mouth 2 times a day. 360 tablet 2    lancets (OneTouch Delica Plus Lancet) 33 gauge misc USE TO TEST TWICE DAILY. 200 each 3    levothyroxine (Synthroid, Levoxyl) 50 mcg tablet Take 1 tablet (50 mcg) by mouth once daily. 90 tablet 3    lisinopril 20 mg tablet Take 1 tablet (20 mg) by mouth once daily.  "90 tablet 3    magnesium oxide (Mag-Ox) 250 mg magnesium tablet Take 1 tablet (250 mg) by mouth 2 times a day.      metFORMIN (Glucophage) 500 mg tablet Take 2 tablets (1,000 mg) by mouth 2 times a day.      nystatin-triamcinolone (Mycolog II) cream Apply 1 Application topically 3 times a day as needed.      ondansetron (Zofran) 4 mg tablet Take 1 tablet (4 mg) by mouth every 8 hours if needed for nausea.      OneTouch Verio Meter misc test as directed      OneTouch Verio test strips strip TEST BLOOD GLUCOSE TWICE DAILY. 200 strip 3    syringe with needle (BD Luer-Adrian Syringe) 3 mL 25 gauge x 1\" syringe USE FOR VITAMIN B12 INJECTIONS ONCE A MONTH 4 each 0    triamterene-hydrochlorothiazid (Maxzide-25) 37.5-25 mg tablet Take 1 tablet by mouth once daily. Take with food. 90 tablet 2    meclizine (Antivert) 12.5 mg tablet TAKE 1 TO 2 TABLETS BY MOUTH EVERY 4 TO 6 HOURS AS NEEDED FOR DIZZINESS. CAN CAUSE DROWSINESS      moxifloxacin (Vigamox) 0.5 % ophthalmic solution       prednisoLONE acetate (Pred-Forte) 1 % ophthalmic suspension       Prolensa 0.07 % drops       [DISCONTINUED] cycloSPORINE (Restasis) 0.05 % ophthalmic emulsion Administer into affected eye(s) every 12 hours.      [DISCONTINUED] LORazepam (Ativan) 1 mg tablet Take 1 tablet daily as needed For flying 2 tablet 0    [DISCONTINUED] meclizine (Antivert) 25 mg tablet Take 1 tablet (25 mg) by mouth 2 times a day as needed.      [DISCONTINUED] meloxicam (Mobic) 15 mg tablet Take 1 tablet (15 mg) by mouth once daily. 30 tablet 11    [DISCONTINUED] scopolamine (Transderm-Scop) 1 mg over 3 days patch 3 day Place on the skin.       No current facility-administered medications on file prior to visit.      Lab Review  Lab Results   Component Value Date    BILITOT 0.6 08/24/2023    CALCIUM 10.1 08/24/2023    CO2 26 08/24/2023     08/24/2023    CREATININE 0.99 08/24/2023    GLUCOSE 217 (H) 08/24/2023    ALKPHOS 59 08/24/2023    K 3.9 08/24/2023    PROT 6.0 (L) " 08/24/2023     08/24/2023    AST 19 08/24/2023    ALT 22 08/24/2023    BUN 21 08/24/2023    ANIONGAP 13 08/24/2023    MG 1.51 (L) 08/24/2023    PHOS 3.0 01/30/2021    ALBUMIN 3.7 08/24/2023    AMYLASE 86 12/02/2020    LIPASE 122 (H) 12/02/2020    GFRF 64 08/24/2023    GFRMALE CANCELED 08/24/2023     Lab Results   Component Value Date    TRIG 335 (H) 04/28/2023    CHOL 211 (H) 04/28/2023    HDL 36.1 (A) 04/28/2023     Lab Results   Component Value Date    HGBA1C 9.7 (A) 09/01/2023    HGBA1C 8.5 (A) 04/28/2023    HGBA1C 9.8 (A) 01/07/2023     The 10-year ASCVD risk score (Duane ZHOU, et al., 2019) is: 26.6%    Values used to calculate the score:      Age: 63 years      Sex: Female      Is Non- : No      Diabetic: Yes      Tobacco smoker: No      Systolic Blood Pressure: 172 mmHg      Is BP treated: Yes      HDL Cholesterol: 36.1 mg/dL      Total Cholesterol: 211 mg/dL    Health Maintenance:   Foot Exam: None  Eye Exam: ~2 months ago   Lipid Panel:  mg/dL 04/28/23  Urine Albumin: Unable to calculate due to albumin <7 01/07/23  Influenza Vaccine: Gets every year   Pneumonia Vaccine: PPSV23 07/09/13    Drug Interactions:  Additive CNS Depression: lorazepam, tramadol, meclizine   Patient only uses Lorazepam for flying and will only use meclizine when needed for vertigo (states she rarely uses it)    Assessment/Plan   Problem List Items Addressed This Visit       Primary hypertension     Blood Pressure monitor comes pre-validated for 2 years and did not need monitor validation today.   Blood pressure log/diary was present during today's visit.   Smoker status: non-smoker  Blood Pressure: uncontrolled  Goal of <130/80 mmHg is NOT met   CONTINUE:   Atenolol 25 mg 1 tablet by mouth twice daily (patient decreased her morning dose back down to 2 tablets because she felt like the 2 tablets was not helping her blood pressure)  Lisinopril 20 mg 1 tablet by mouth once daily    Triamterene-hydrochlorothiazide 37.5-25 mg 1 tablet by mouth once daily   Patient does check her blood pressure in the morning after drinking coffee. I do anticipate that without caffeine affecting her blood pressure her home readings are likely lower. She also does have white coat hypertension and her readings in office are usually much higher than her readings at home. She will check her blood pressure prior to drinking coffee moving forward for the next 4 weeks. If it continues to be elevated above goal, we will increase her Lisinopril to 30 mg at our next follow-up visit.   Counseling Points:  I have counseled this patient on appropriate blood pressure monitor techniques, provided a blood pressure monitor log, and have advised the patient to contact me with questions regarding their blood pressure.  Medications are properly dosed for renal and hepatic function.         Relevant Orders    Follow Up In [facility] - Pharmacy    Type 2 diabetes mellitus without complication, without long-term current use of insulin (CMS/[facility])     Shwetha Madrigal has uncontrolled type 2 diabetes complicated by hypertension, dyslipidemia, and obesity as evidenced by her most recent A1c of 9.7% on 09/01/23 which had increased from 8.5% on 04/28/23. She has not been checking her blood sugars at home due to her monitor needing the batteries replaced.   Patient is hesitant to start any new medications. We discussed options for additional medication therapy including GLP-1 agonists including Rybelsus and SGLT2 inhibitors. Patient is not interesting in starting a GLP-1 agonist at this time, she was informed that this would be the most effective option for her blood sugar and would give her the benefit of weight loss in addition to lowering blood sugars. She does seem more open to starting an SGLT2 inhibitor however would like to continue working on lifestyle changes to help her blood sugars. We discussed that an A1c of >10%  usually indicates that a patient may need insulin therapy.   Patient does report that she has made drastic changes to her diet since she had her A1c checked in September and has not been checking her blood sugar at home. Today she was set up with the Freestyle Codi 3 CGM system to monitor her blood sugar and was connected to my account. The first sensor was placed before she left today. In 4 weeks she will come in and we will go over her Codi report together. At that time patient is aware that she will have to start an additional medication if her blood sugars are not improving enough.   Patient was interested in meeting with a dietician, a referral to nutrition services was placed and patient was given the phone number to call and schedule with them.   CONTINUE:   Glyburide 2.5 mg 2 tablets by mouth twice daily   Metformin 1000 mg 1 tablet by mouth twice daily   Patient was encouraged to get the yearly influenza vaccine this season  Future consideration:   We will likely start Jardiance 10 mg once daily or Mounjaro 2.5 mg once weekly at next appointment   Both of these medications are covered for $0 under patient's insurance  Education provided to patient: blood sugar goals, medication mechanism of action and side effects  Compliance at present is estimated to be excellent.           Other Visit Diagnoses       Uncontrolled type 2 diabetes mellitus with hyperglycemia (CMS/[facility])    -  Primary    Relevant Medications    blood-glucose sensor (FreeStyle Codi 3 Sensor) device    blood-glucose sensor (FreeStyle Codi 3 Sensor) device    Other Relevant Orders    Follow Up In [facility] - Pharmacy    Referral to Nutrition Services          Pharmacy Follow-Up: 11/01/23  PCP Follow-Up: 11/10/23    Jorge Miller PharmD     Continue all meds under the continuation of care with the referring provider and clinical pharmacy team.

## 2023-10-04 NOTE — Clinical Note
Good afternoon Dr. Kenyon,   Ms. Madrigal came in for her initial pharmacy visit today, she was very hesitant on starting medications and reports she has made drastic changes to her diet since 09/01/23 so she's going to wear a Codi sensor for 4 weeks and then we will go over her report together. She's aware that I will start a medication to help with her blood sugars at that time unless there is significant improvement in her readings. She did mention she needs refills on her Vitamin B12 shot and syringes/needles too, however. Would you be able to submit those refills to her home pharmacy for her?  Thank you!  Jorge Miller, RohitD

## 2023-10-04 NOTE — ASSESSMENT & PLAN NOTE
Blood Pressure monitor comes pre-validated for 2 years and did not need monitor validation today.   Blood pressure log/diary was present during today's visit.   Smoker status: non-smoker  Blood Pressure: uncontrolled  Goal of <130/80 mmHg is NOT met   CONTINUE:   Atenolol 25 mg 1 tablet by mouth twice daily (patient decreased her morning dose back down to 2 tablets because she felt like the 2 tablets was not helping her blood pressure)  Lisinopril 20 mg 1 tablet by mouth once daily   Triamterene-hydrochlorothiazide 37.5-25 mg 1 tablet by mouth once daily   Patient does check her blood pressure in the morning after drinking coffee. I do anticipate that without caffeine affecting her blood pressure her home readings are likely lower. She also does have white coat hypertension and her readings in office are usually much higher than her readings at home. She will check her blood pressure prior to drinking coffee moving forward for the next 4 weeks. If it continues to be elevated above goal, we will increase her Lisinopril to 30 mg at our next follow-up visit.   Counseling Points:  I have counseled this patient on appropriate blood pressure monitor techniques, provided a blood pressure monitor log, and have advised the patient to contact me with questions regarding their blood pressure.  Medications are properly dosed for renal and hepatic function.

## 2023-10-06 DIAGNOSIS — E53.8 VITAMIN B12 DEFICIENCY: ICD-10-CM

## 2023-10-06 RX ORDER — CYANOCOBALAMIN 1000 UG/ML
INJECTION, SOLUTION INTRAMUSCULAR; SUBCUTANEOUS
Qty: 4 ML | Refills: 0 | Status: SHIPPED | OUTPATIENT
Start: 2023-10-06 | End: 2023-12-08 | Stop reason: SDUPTHER

## 2023-10-06 RX ORDER — SYRINGE WITH NEEDLE, 1 ML 25GX5/8"
SYRINGE, EMPTY DISPOSABLE MISCELLANEOUS
Qty: 4 EACH | Refills: 0 | Status: SHIPPED | OUTPATIENT
Start: 2023-10-06 | End: 2023-12-08 | Stop reason: SDUPTHER

## 2023-10-17 DIAGNOSIS — R21 RASH: Primary | ICD-10-CM

## 2023-10-17 RX ORDER — CLOBETASOL PROPIONATE 0.5 MG/G
EMULSION TOPICAL
Qty: 60 G | Refills: 3 | Status: SHIPPED | OUTPATIENT
Start: 2023-10-17

## 2023-10-19 DIAGNOSIS — R79.89 LOW VITAMIN D LEVEL: ICD-10-CM

## 2023-10-19 RX ORDER — ERGOCALCIFEROL 1.25 MG/1
1 CAPSULE ORAL
Qty: 4 CAPSULE | Refills: 3 | Status: SHIPPED | OUTPATIENT
Start: 2023-10-19 | End: 2024-02-23 | Stop reason: SDUPTHER

## 2023-11-01 ENCOUNTER — APPOINTMENT (OUTPATIENT)
Dept: PHARMACY | Facility: HOSPITAL | Age: 63
End: 2023-11-01
Payer: COMMERCIAL

## 2023-11-01 ENCOUNTER — PHARMACY VISIT (OUTPATIENT)
Dept: PHARMACY | Facility: CLINIC | Age: 63
End: 2023-11-01
Payer: COMMERCIAL

## 2023-11-01 ENCOUNTER — CLINICAL SUPPORT (OUTPATIENT)
Dept: PRIMARY CARE | Facility: CLINIC | Age: 63
End: 2023-11-01
Payer: COMMERCIAL

## 2023-11-01 VITALS — WEIGHT: 214.8 LBS | BODY MASS INDEX: 36.3 KG/M2

## 2023-11-01 DIAGNOSIS — I10 PRIMARY HYPERTENSION: ICD-10-CM

## 2023-11-01 DIAGNOSIS — E11.65 UNCONTROLLED TYPE 2 DIABETES MELLITUS WITH HYPERGLYCEMIA (MULTI): Primary | ICD-10-CM

## 2023-11-01 DIAGNOSIS — E11.9 TYPE 2 DIABETES MELLITUS WITHOUT COMPLICATION, WITHOUT LONG-TERM CURRENT USE OF INSULIN (MULTI): ICD-10-CM

## 2023-11-01 PROCEDURE — RXMED WILLOW AMBULATORY MEDICATION CHARGE

## 2023-11-01 RX ORDER — BLOOD-GLUCOSE SENSOR
EACH MISCELLANEOUS
Qty: 6 EACH | Refills: 3 | Status: SHIPPED | OUTPATIENT
Start: 2023-11-01

## 2023-11-01 ASSESSMENT — ENCOUNTER SYMPTOMS
HYPERTENSION: 1
DIABETIC ASSOCIATED SYMPTOMS: 0

## 2023-11-01 NOTE — ASSESSMENT & PLAN NOTE
Shwetha Madrigal has improving type 2 diabetes mellitus complicated by hypertension, hyperlipidemia, and obesity as evidenced by her most recent A1c of 9.7% on 09/01/23 but her time in range on her Codi over the past month of 90% which is above goal of >70% and her GMI of 6.4% with average blood sugar of 129 mg/dL. She has been able to maintain her diet changes over the past 2 months and states she has been working with functional nutrition as well. She credits using the Codi 3 system as helping her a lot too as she's able to see exactly how certain foods affect her blood sugar and make adjustments with portion sizes and eating choices.   CONTINUE:   Metformin 500 mg 2 tablets by mouth twice daily   Glyburide 5 mg 2 tablets by mouth twice daily   Codi 3 sensors apply 1 sensor to the back of arm for continuous glucose monitoring, remove and apply new sensor every 14 days   Refills were sent to [facility] for a 3 month supply of sensors  Patient will be due for an updated A1c anytime after 12/01/23 as last A1c was 09/01/23. An order was placed for her to have this completed prior to our next follow-up appointment on 12/08/23. While it may not be as low as <7% due to her blood sugars in September being more elevated I do suspect that it will drastically improve.   Compliance at present is estimated to be excellent  I will follow-up with patient in ~1 month to assess her A1c and blood sugar control at that time.

## 2023-11-01 NOTE — ASSESSMENT & PLAN NOTE
Blood Pressure monitor comes pre-validated for 2 years and did not need monitor validation today.   Blood pressure log/diary was present during today's visit.   Smoker status: non-smoker  Blood Pressure: controlled  Goal of <130/80 mmHg IS met   CONTINUE:   Atenolol 25 mg 1 tablet by mouth twice daily   Lisinopril 20 mg 1 tablet by mouth once daily   Triamterene-hydrochlorothiazide 37.5-25 mg 1 tablet by mouth once daily   Patient's blood pressures have improved over the past month, likely in part to her dietary changes and improved lifestyle. At this time her blood pressure is well controlled and she has no need for medication adjustments. If this changes in the future we can re-assess her medication therapy for blood pressure.   Counseling Points:  I have counseled this patient on appropriate blood pressure monitor techniques, provided a blood pressure monitor log, and have advised the patient to contact me with questions regarding their blood pressure.  Medications are properly dosed for renal and hepatic function.

## 2023-11-01 NOTE — PROGRESS NOTES
Patient is sent at the request of Whit Kenyon MD for my opinion regarding Type 2 diabetes and hypertension.  My final recommendations will be communicated back to the requesting provider by way of shared medical record.    Subjective     Diabetes  She presents for her follow-up diabetic visit. She has type 2 diabetes mellitus. The initial diagnosis of diabetes was made 16 years ago. Her disease course has been improving. There are no hypoglycemic associated symptoms. (Experiences symptoms of hypoglycemia when she skips dinner) There are no diabetic associated symptoms. (Notices more symptoms when she eats higher carbohydrates) There are no hypoglycemic complications. Symptoms are stable. There are no diabetic complications. Risk factors for coronary artery disease include diabetes mellitus, dyslipidemia, hypertension and obesity. Current diabetic treatment includes oral agent (dual therapy) (Metformin and Glyburide). She is compliant with treatment all of the time. Her weight is decreasing steadily. She is following a generally healthy diet. She rarely participates in exercise. Her overall blood glucose range is 110-130 mg/dl. An ACE inhibitor/angiotensin II receptor blocker is being taken. She does not see a podiatrist.Eye exam is current.   Hypertension  This is a chronic problem. The current episode started more than 1 year ago. The problem has been gradually improving since onset. The problem is controlled. Agents associated with hypertension include estrogens, thyroid hormones and NSAIDs. Risk factors for coronary artery disease include diabetes mellitus, dyslipidemia and obesity. Past treatments include calcium channel blockers. The current treatment provides mild improvement. There are no compliance problems.  Identifiable causes of hypertension include a hypertension causing med and a thyroid problem.     Current diet:   Trying to eat plant based with no processed foods   Breakfast: takes some  "pretzels with her medications in the morning, coffee with half and half creamer, low sugar instant oatmeal at work, will also usually have a banana   Wants to try to eat an egg in the morning with her medications instead of pretzels  Lunch: tries to stick to salads with some protein, tries not to use bottled dressing, packs her lunch   Dinner: trying to eat salads as much as she can, will sometimes have sausage with them, kielbasa and sour kraut with mashed potatoes  Snacks: does snack on pretzels in between meals, avoids sweets, sometimes crackers with spread-able cheese  Fluids: water, hot tea  Does not eat fast food or out at restaurants  Has done really well over the past couple months at sticking to this diet and not having \"cheat days\"    Current exercise:   Has 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 which limits her ability to exercise and walk throughout the day     Sodium intake:   Sometimes makes soups with canned black beans and green beans and other vegetables  Snacks on pretzels and crackers  Does not add salt to meals, will sometimes add salt to meals when cooking     Allergies   Allergen Reactions    Doxycycline Unknown    Sulfamethoxazole-Trimethoprim Hives    Cephalexin Itching and Rash     Pruritus    Penicillins Headache, Unknown and Rash     rash    headache    rash   headache     Hypertension:    Blood Pressure Monitor Device: Omron purchased from our [facility] ~1 month ago    Affordability/Accessibility: No issues   Adherence/Organization: Uses a 2-week pill box   Did you take your antihypertensive medications this morning: Yes  Time of Administration of Antihypertensive(s):  0500  Have you missed any doses of your antihypertensives? No  Adverse Effects: Reports no issues     Patient mostly checks her blood pressures once she gets to work in the morning after drinking coffee   SMBP Measurements   Date/Time Systolic Diastolic Heart Rate   10/05/23 154 93 65   10/06/23 139 84 62   10/11/23 124 " "76 71   10/12/23 126 78 71   10/13/23 130 78 66   10/16/23 126 80 70   10/17/23 127 80 71   10/18/23 126 82 71   10/19/23 120 74 72   10/20/23 116 72 82   10/23/23 124 77 78   10/24/23 127 80 70   10/25/23 122 78 70   10/26/23 121 78 77   10/27/23 122 76 74   10/30/23 119 74 75   10/31/23 118 70 75   Average:  126 78 72     ASSESSMENT OF SMBP MEASUREMENTS  Highest readin/93 mmHg  Lowest readin/72 mmHg  Average: 126/78 mmHg    Has the patient experienced any low blood pressures since last contact? No  denies symptoms of low blood pressure: lightheadedness, dizziness, falls, blurry vision, clammy/pale skin     Has the patient experienced any high blood pressures since last contact? No  denies symptoms of high blood pressure: headache, chest pain, vision problems    Diabetes    When A1c was 9.8% in January she states she was on a lot of cruises not watching what she eat. She was able to get it improved to 8.5% on 23 however it recently jumped back up to 9.7% on 23. Since then patient has made drastic changes to her diet and has been able to stick with them for ~2 months.     The patient does have a known family history of diabetes (mother)     Patient is using: continuous glucose monitor (Codi 3) to self monitor blood sugar at home           Hypoglycemia frequency: will get low in the morning when she skips dinner, she states that she does get symptoms such as sweatiness, anxiousness/nervousness, and shakiness   Hypoglycemia awareness: Yes     Objective     Wt 97.4 kg (214 lb 12.8 oz)   BMI 36.30 kg/m²     Wt Readings from Last 3 Encounters:   23 97.4 kg (214 lb 12.8 oz)   10/04/23 103 kg (226 lb 3.2 oz)   23 104 kg (228 lb 12.8 oz)     Estimated body mass index is 36.3 kg/m² as calculated from the following:    Height as of 23: 1.638 m (5' 4.5\").    Weight as of this encounter: 97.4 kg (214 lb 12.8 oz).    Diabetes Pharmacotherapy:  Glyburide 5 mg 2 tablets twice daily "   Metformin 1000 mg twice daily     Historical Diabetes Pharmacotherapy:   Januvia 25 mg     Hypertension Pharmacotherapy:  Atenolol 25 mg 1 tablet by mouth twice daily   Lisinopril 20 mg once daily   Triamterene-hydrochlorothiazide 37.5-25 mg once daily     Historical Hypertension Pharmacotherapy:   Amlodipine (did not tolerate due to edema)    SECONDARY PREVENTION  Statin? No  ACE-I/ARB? No  Aspirin? No    Pertinent PMH Review:  PMH of Pancreatitis: No  PMH of Retinopathy: No  PMH of Urinary Tract Infections: Yes, does not get frequently   PMH of MTC: No    Medication Reconciliation  No changes were made to patient's medication list today     Current Outpatient Medications on File Prior to Visit   Medication Sig Dispense Refill    alcohol swabs pads, medicated Use as needed. 100 each 1    atenolol (Tenormin) 25 mg tablet Take 2 tab in AM and 1 tab in PM. (Patient taking differently: Take 1 tablet (25 mg) by mouth 2 times a day. Take 2 tab in AM and 1 tab in PM.) 270 tablet 3    blood sugar diagnostic (Contour Next Test Strips) strip twice a day.      clobetasoL-emollient 0.05 % cream APPY AND GENTLY MASSAGE INTO AFFECTED AREA(S) TWICE DAILY AS NEEDED FOR ITCHING 60 g 3    cyanocobalamin (Vitamin B-12) 1,000 mcg/mL injection Inject 1,000 mcg every 2 weeks for 8 weeks then once a month. 4 mL 0    ergocalciferol (Vitamin D-2) 1.25 MG (85358 UT) capsule Take 1 capsule (1,250 mcg) by mouth 1 (one) time per week. 4 capsule 3    estradiol (Estrace) 0.1 MG/GM vaginal cream APPLY 0.5 GRAMS VAGINALLY WEEKLY AS DIRECTED 42.5 g 3    fluticasone (Flonase) 50 mcg/actuation nasal spray Administer 2 sprays into affected nostril(s) once daily as needed.      glyBURIDE (Diabeta) 5 mg tablet Take 2 tablets (10 mg) by mouth 2 times a day. 360 tablet 2    lancets (OneTouch Delica Plus Lancet) 33 gauge misc USE TO TEST TWICE DAILY. 200 each 3    levothyroxine (Synthroid, Levoxyl) 50 mcg tablet Take 1 tablet (50 mcg) by mouth once  "daily. 90 tablet 3    lisinopril 20 mg tablet Take 1 tablet (20 mg) by mouth once daily. 90 tablet 3    magnesium oxide (Mag-Ox) 250 mg magnesium tablet Take 1 tablet (250 mg) by mouth 2 times a day.      meclizine (Antivert) 12.5 mg tablet TAKE 1 TO 2 TABLETS BY MOUTH EVERY 4 TO 6 HOURS AS NEEDED FOR DIZZINESS. CAN CAUSE DROWSINESS      metFORMIN (Glucophage) 500 mg tablet Take 2 tablets (1,000 mg) by mouth 2 times a day.      moxifloxacin (Vigamox) 0.5 % ophthalmic solution       nystatin-triamcinolone (Mycolog II) cream Apply 1 Application topically 3 times a day as needed.      ondansetron (Zofran) 4 mg tablet Take 1 tablet (4 mg) by mouth every 8 hours if needed for nausea.      OneTouch Verio Meter misc test as directed      OneTouch Verio test strips strip TEST BLOOD GLUCOSE TWICE DAILY. 200 strip 3    prednisoLONE acetate (Pred-Forte) 1 % ophthalmic suspension       Prolensa 0.07 % drops       syringe with needle (BD Luer-Adrian Syringe) 3 mL 25 gauge x 1\" syringe USE FOR VITAMIN B12 INJECTIONS ONCE A MONTH 4 each 0    triamterene-hydrochlorothiazid (Maxzide-25) 37.5-25 mg tablet Take 1 tablet by mouth once daily. Take with food. 90 tablet 2    [DISCONTINUED] blood-glucose sensor (FreeStyle Codi 3 Sensor) device Apply 1 sensor to back of arm for continuous glucose monitoring. Remove and apply new sensor every 14 days 1 each 0    [DISCONTINUED] blood-glucose sensor (FreeStyle Codi 3 Sensor) device Apply 1 sensor to the back of the arm for continuous glucose monitoring. Remove and apply new sensor every 14 days 2 each 3     No current facility-administered medications on file prior to visit.      Lab Review  Lab Results   Component Value Date    BILITOT 0.6 08/24/2023    CALCIUM 10.1 08/24/2023    CO2 26 08/24/2023     08/24/2023    CREATININE 0.99 08/24/2023    GLUCOSE 217 (H) 08/24/2023    ALKPHOS 59 08/24/2023    K 3.9 08/24/2023    PROT 6.0 (L) 08/24/2023     08/24/2023    AST 19 08/24/2023    " ALT 22 08/24/2023    BUN 21 08/24/2023    ANIONGAP 13 08/24/2023    MG 1.51 (L) 08/24/2023    PHOS 3.0 01/30/2021    ALBUMIN 3.7 08/24/2023    AMYLASE 86 12/02/2020    LIPASE 122 (H) 12/02/2020    GFRF 64 08/24/2023    GFRMALE CANCELED 08/24/2023     Lab Results   Component Value Date    TRIG 335 (H) 04/28/2023    CHOL 211 (H) 04/28/2023    HDL 36.1 (A) 04/28/2023     Lab Results   Component Value Date    HGBA1C 9.7 (A) 09/01/2023    HGBA1C 8.5 (A) 04/28/2023    HGBA1C 9.8 (A) 01/07/2023     The 10-year ASCVD risk score (Duane ZHOU, et al., 2019) is: 26.6%    Values used to calculate the score:      Age: 63 years      Sex: Female      Is Non- : No      Diabetic: Yes      Tobacco smoker: No      Systolic Blood Pressure: 172 mmHg      Is BP treated: Yes      HDL Cholesterol: 36.1 mg/dL      Total Cholesterol: 211 mg/dL    Health Maintenance:   Foot Exam: None  Eye Exam: ~2 months ago   Lipid Panel:  mg/dL 04/28/23  Urine Albumin: Unable to calculate due to albumin <7 01/07/23  Influenza Vaccine: 10/19/2023  Pneumonia Vaccine: PPSV23 07/09/13    Drug Interactions:  Additive CNS Depression: lorazepam, tramadol, meclizine   Patient only uses Lorazepam for flying and will only use meclizine when needed for vertigo (states she rarely uses it)    Assessment/Plan   Problem List Items Addressed This Visit       Primary hypertension     Blood Pressure monitor comes pre-validated for 2 years and did not need monitor validation today.   Blood pressure log/diary was present during today's visit.   Smoker status: non-smoker  Blood Pressure: controlled  Goal of <130/80 mmHg IS met   CONTINUE:   Atenolol 25 mg 1 tablet by mouth twice daily   Lisinopril 20 mg 1 tablet by mouth once daily   Triamterene-hydrochlorothiazide 37.5-25 mg 1 tablet by mouth once daily   Patient's blood pressures have improved over the past month, likely in part to her dietary changes and improved lifestyle. At this time her  blood pressure is well controlled and she has no need for medication adjustments. If this changes in the future we can re-assess her medication therapy for blood pressure.   Counseling Points:  I have counseled this patient on appropriate blood pressure monitor techniques, provided a blood pressure monitor log, and have advised the patient to contact me with questions regarding their blood pressure.  Medications are properly dosed for renal and hepatic function.         Relevant Orders    Follow Up In [facility] - Pharmacy    Type 2 diabetes mellitus without complication, without long-term current use of insulin (CMS/[facility])     Shwetha Madrigal has improving type 2 diabetes mellitus complicated by hypertension, hyperlipidemia, and obesity as evidenced by her most recent A1c of 9.7% on 09/01/23 but her time in range on her Codi over the past month of 90% which is above goal of >70% and her GMI of 6.4% with average blood sugar of 129 mg/dL. She has been able to maintain her diet changes over the past 2 months and states she has been working with functional nutrition as well. She credits using the Codi 3 system as helping her a lot too as she's able to see exactly how certain foods affect her blood sugar and make adjustments with portion sizes and eating choices.   CONTINUE:   Metformin 500 mg 2 tablets by mouth twice daily   Glyburide 5 mg 2 tablets by mouth twice daily   Codi 3 sensors apply 1 sensor to the back of arm for continuous glucose monitoring, remove and apply new sensor every 14 days   Refills were sent to [facility] for a 3 month supply of sensors  Patient will be due for an updated A1c anytime after 12/01/23 as last A1c was 09/01/23. An order was placed for her to have this completed prior to our next follow-up appointment on 12/08/23. While it may not be as low as <7% due to her blood sugars in September being more elevated I do suspect that it will drastically improve.    Compliance at present is estimated to be excellent  I will follow-up with patient in ~1 month to assess her A1c and blood sugar control at that time.           Other Visit Diagnoses       Uncontrolled type 2 diabetes mellitus with hyperglycemia (CMS/[facility])    -  Primary    Relevant Medications    blood-glucose sensor (FreeStyle Codi 3 Sensor) device    Other Relevant Orders    Hemoglobin A1c    Follow Up In [facility] - Pharmacy          Pharmacy Follow-Up: 12/08/23   PCP Follow-Up: 11/10/23    Jorge Miller PharmD     Continue all meds under the continuation of care with the referring provider and clinical pharmacy team.

## 2023-11-10 ENCOUNTER — APPOINTMENT (OUTPATIENT)
Dept: PRIMARY CARE | Facility: CLINIC | Age: 63
End: 2023-11-10
Payer: COMMERCIAL

## 2023-11-10 DIAGNOSIS — E11.9 TYPE 2 DIABETES MELLITUS WITHOUT COMPLICATION, WITHOUT LONG-TERM CURRENT USE OF INSULIN (MULTI): Primary | ICD-10-CM

## 2023-11-10 RX ORDER — METFORMIN HYDROCHLORIDE 500 MG/1
1000 TABLET ORAL 2 TIMES DAILY
Qty: 360 TABLET | Refills: 3 | Status: SHIPPED | OUTPATIENT
Start: 2023-11-10

## 2023-11-16 DIAGNOSIS — R21 RASH: Primary | ICD-10-CM

## 2023-11-16 RX ORDER — NYSTATIN AND TRIAMCINOLONE ACETONIDE 100000; 1 [USP'U]/G; MG/G
CREAM TOPICAL
Qty: 60 G | Refills: 1 | Status: SHIPPED | OUTPATIENT
Start: 2023-11-16

## 2023-12-02 ENCOUNTER — LAB (OUTPATIENT)
Dept: LAB | Facility: LAB | Age: 63
End: 2023-12-02
Payer: COMMERCIAL

## 2023-12-02 DIAGNOSIS — E11.65 UNCONTROLLED TYPE 2 DIABETES MELLITUS WITH HYPERGLYCEMIA (MULTI): ICD-10-CM

## 2023-12-02 LAB
EST. AVERAGE GLUCOSE BLD GHB EST-MCNC: 146 MG/DL
HBA1C MFR BLD: 6.7 %

## 2023-12-02 PROCEDURE — 36415 COLL VENOUS BLD VENIPUNCTURE: CPT

## 2023-12-02 PROCEDURE — 83036 HEMOGLOBIN GLYCOSYLATED A1C: CPT

## 2023-12-08 ENCOUNTER — TELEMEDICINE (OUTPATIENT)
Dept: PHARMACY | Facility: HOSPITAL | Age: 63
End: 2023-12-08
Payer: COMMERCIAL

## 2023-12-08 DIAGNOSIS — E11.9 TYPE 2 DIABETES MELLITUS WITHOUT COMPLICATION, WITHOUT LONG-TERM CURRENT USE OF INSULIN (MULTI): Primary | ICD-10-CM

## 2023-12-08 DIAGNOSIS — E53.8 VITAMIN B12 DEFICIENCY: ICD-10-CM

## 2023-12-08 DIAGNOSIS — E11.65 UNCONTROLLED TYPE 2 DIABETES MELLITUS WITH HYPERGLYCEMIA (MULTI): ICD-10-CM

## 2023-12-08 DIAGNOSIS — I10 PRIMARY HYPERTENSION: ICD-10-CM

## 2023-12-08 RX ORDER — SYRINGE WITH NEEDLE, 1 ML 25GX5/8"
SYRINGE, EMPTY DISPOSABLE MISCELLANEOUS
Qty: 4 EACH | Refills: 3 | Status: SHIPPED | OUTPATIENT
Start: 2023-12-08

## 2023-12-08 RX ORDER — CYANOCOBALAMIN 1000 UG/ML
INJECTION, SOLUTION INTRAMUSCULAR; SUBCUTANEOUS
Qty: 4 ML | Refills: 3 | Status: SHIPPED | OUTPATIENT
Start: 2023-12-08 | End: 2024-02-19

## 2023-12-08 ASSESSMENT — ENCOUNTER SYMPTOMS
DIABETIC ASSOCIATED SYMPTOMS: 0
HYPERTENSION: 1

## 2023-12-08 NOTE — ASSESSMENT & PLAN NOTE
Shwetha Madrigal has controlled type 2 diabetes mellitus complicated by hypertension, hyperlipidemia, and obesity as evidenced by her most recent A1c of 6.7% on 12/02/23 which had decreased significantly from 9.7% on 09/01/23. This decrease is due to patient making lifestyle changes including eating better, exercising more, and using the Freestyle Codi system to help monitor her blood sugars response to different foods.   CONTINUE:   Metformin 500 mg 2 tablets by mouth twice daily   Glyburide 5 mg 2 tablets by mouth twice daily   Codi 3 sensors apply 1 sensor to the back of arm for continuous glucose monitoring, remove and apply new sensor every 14 days   Compliance at present is estimated to be excellent.   We will follow-up in ~3 months to ensure her blood sugar is staying controlled at that time.

## 2023-12-08 NOTE — PROGRESS NOTES
Patient is sent at the request of Whit Kenyon MD for my opinion regarding Type 2 diabetes and hypertension.  My final recommendations will be communicated back to the requesting provider by way of shared medical record.    Subjective     Diabetes  She presents for her follow-up diabetic visit. She has type 2 diabetes mellitus. The initial diagnosis of diabetes was made 16 years ago. Her disease course has been improving. There are no hypoglycemic associated symptoms. There are no diabetic associated symptoms. (Notices more symptoms when she eats higher carbohydrates) There are no hypoglycemic complications. Symptoms are stable. There are no diabetic complications. Risk factors for coronary artery disease include diabetes mellitus, dyslipidemia, hypertension and obesity. Current diabetic treatment includes oral agent (dual therapy) (Metformin and Glyburide). She is compliant with treatment all of the time. Her weight is decreasing steadily. She is following a generally healthy diet. She rarely participates in exercise. Her overall blood glucose range is 110-130 mg/dl. An ACE inhibitor/angiotensin II receptor blocker is being taken. She does not see a podiatrist.Eye exam is current.   Hypertension  This is a chronic problem. The current episode started more than 1 year ago. The problem has been gradually improving since onset. The problem is controlled. Agents associated with hypertension include estrogens, thyroid hormones and NSAIDs. Risk factors for coronary artery disease include diabetes mellitus, dyslipidemia and obesity. Past treatments include calcium channel blockers. The current treatment provides mild improvement. There are no compliance problems.  Identifiable causes of hypertension include a hypertension causing med and a thyroid problem.     Current diet:   Trying to eat plant based with no processed foods   Breakfast: takes some pretzels with her medications in the morning, coffee with half and  "half creamer, low sugar instant oatmeal at work, will also usually have a banana   Wants to try to eat an egg in the morning with her medications instead of pretzels  Lunch: tries to stick to salads with some protein, tries not to use bottled dressing, packs her lunch   Dinner: trying to eat salads as much as she can, will sometimes have sausage with them, kielbasa and sour kraut with mashed potatoes  Snacks: does snack on pretzels in between meals, avoids sweets, sometimes crackers with spread-able cheese  Fluids: water, hot tea  Does not eat fast food or out at restaurants  Has done really well over the past couple months at sticking to this diet and not having \"cheat days\"    Current exercise:   Has 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 which limits her ability to exercise and walk throughout the day     Sodium intake:   Sometimes makes soups with canned black beans and green beans and other vegetables  Snacks on pretzels and crackers  Does not add salt to meals, will sometimes add salt to meals when cooking     Allergies   Allergen Reactions    Doxycycline Unknown    Sulfamethoxazole-Trimethoprim Hives    Cephalexin Itching and Rash     Pruritus    Penicillins Headache, Unknown and Rash     rash    headache    rash   headache     Hypertension:    Blood Pressure Monitor Device: Omron purchased from our [facility] ~1 month ago    Affordability/Accessibility: No issues   Adherence/Organization: Uses a 2-week pill box   Have you missed any doses of your antihypertensives? No  Adverse Effects: Reports no issues     SMBP Measurements:   Averaging 120s/70s-80s  Never goes up into the 130s, is always <130/80 mmHg   Patient did not have log with her today     Has the patient experienced any low blood pressures since last contact? No  denies symptoms of low blood pressure: lightheadedness, dizziness, falls, blurry vision, clammy/pale skin     Has the patient experienced any high blood pressures since last contact? " "No  denies symptoms of high blood pressure: headache, chest pain, vision problems    Diabetes  The patient does have a known family history of diabetes (mother)     Patient is using: continuous glucose monitor (Codi 3) to self monitor blood sugar at home         Hypoglycemia frequency: none   Hypoglycemia awareness: Yes     Objective     Wt Readings from Last 3 Encounters:   11/01/23 97.4 kg (214 lb 12.8 oz)   10/04/23 103 kg (226 lb 3.2 oz)   09/01/23 104 kg (228 lb 12.8 oz)     Estimated body mass index is 36.3 kg/m² as calculated from the following:    Height as of 9/1/23: 1.638 m (5' 4.5\").    Weight as of 11/1/23: 97.4 kg (214 lb 12.8 oz).    Diabetes Pharmacotherapy:  Glyburide 5 mg 2 tablets twice daily   Metformin 1000 mg twice daily     Historical Diabetes Pharmacotherapy:   Januvia 25 mg     Hypertension Pharmacotherapy:  Atenolol 25 mg 1 tablet by mouth twice daily   Lisinopril 20 mg once daily   Triamterene-hydrochlorothiazide 37.5-25 mg once daily     Historical Hypertension Pharmacotherapy:   Amlodipine (did not tolerate due to edema)    SECONDARY PREVENTION  Statin? No  ACE-I/ARB? No  Aspirin? No    Pertinent PMH Review:  PMH of Pancreatitis: No  PMH of Retinopathy: No  PMH of Urinary Tract Infections: Yes, does not get frequently   PMH of MTC: No    Medication Reconciliation  No changes were made to patient's medication list during encounter today     Current Outpatient Medications on File Prior to Visit   Medication Sig Dispense Refill    alcohol swabs pads, medicated Use as needed. 100 each 1    atenolol (Tenormin) 25 mg tablet Take 2 tab in AM and 1 tab in PM. (Patient taking differently: Take 1 tablet (25 mg) by mouth 2 times a day. Take 2 tab in AM and 1 tab in PM.) 270 tablet 3    blood sugar diagnostic (Contour Next Test Strips) strip twice a day.      blood-glucose sensor (FreeStyle Codi 3 Sensor) device Apply 1 sensor to the back of the arm for continuous glucose monitoring. Remove and " apply new sensor every 14 days 6 each 3    clobetasoL-emollient 0.05 % cream APPY AND GENTLY MASSAGE INTO AFFECTED AREA(S) TWICE DAILY AS NEEDED FOR ITCHING 60 g 3    ergocalciferol (Vitamin D-2) 1.25 MG (66228 UT) capsule Take 1 capsule (1,250 mcg) by mouth 1 (one) time per week. 4 capsule 3    estradiol (Estrace) 0.1 MG/GM vaginal cream APPLY 0.5 GRAMS VAGINALLY WEEKLY AS DIRECTED 42.5 g 3    fluticasone (Flonase) 50 mcg/actuation nasal spray Administer 2 sprays into affected nostril(s) once daily as needed.      glyBURIDE (Diabeta) 5 mg tablet Take 2 tablets (10 mg) by mouth 2 times a day. 360 tablet 2    lancets (OneTouch Delica Plus Lancet) 33 gauge misc USE TO TEST TWICE DAILY. 200 each 3    levothyroxine (Synthroid, Levoxyl) 50 mcg tablet Take 1 tablet (50 mcg) by mouth once daily. 90 tablet 3    lisinopril 20 mg tablet Take 1 tablet (20 mg) by mouth once daily. 90 tablet 3    magnesium oxide (Mag-Ox) 250 mg magnesium tablet Take 1 tablet (250 mg) by mouth 2 times a day.      meclizine (Antivert) 12.5 mg tablet TAKE 1 TO 2 TABLETS BY MOUTH EVERY 4 TO 6 HOURS AS NEEDED FOR DIZZINESS. CAN CAUSE DROWSINESS      metFORMIN (Glucophage) 500 mg tablet Take 2 tablets (1,000 mg) by mouth 2 times a day. 360 tablet 3    moxifloxacin (Vigamox) 0.5 % ophthalmic solution       nystatin-triamcinolone (Mycolog II) cream Apply 1 Application topically 3 times a day as needed. 60 g 1    ondansetron (Zofran) 4 mg tablet Take 1 tablet (4 mg) by mouth every 8 hours if needed for nausea.      OneTouch Verio Meter misc test as directed      OneTouch Verio test strips strip TEST BLOOD GLUCOSE TWICE DAILY. 200 strip 3    prednisoLONE acetate (Pred-Forte) 1 % ophthalmic suspension       Prolensa 0.07 % drops       triamterene-hydrochlorothiazid (Maxzide-25) 37.5-25 mg tablet Take 1 tablet by mouth once daily. Take with food. 90 tablet 2    [DISCONTINUED] cyanocobalamin (Vitamin B-12) 1,000 mcg/mL injection Inject 1,000 mcg every 2  "weeks for 8 weeks then once a month. 4 mL 0    [DISCONTINUED] syringe with needle (BD Luer-Adrian Syringe) 3 mL 25 gauge x 1\" syringe USE FOR VITAMIN B12 INJECTIONS ONCE A MONTH 4 each 0     No current facility-administered medications on file prior to visit.      Lab Review  Lab Results   Component Value Date    BILITOT 0.6 08/24/2023    CALCIUM 10.1 08/24/2023    CO2 26 08/24/2023     08/24/2023    CREATININE 0.99 08/24/2023    GLUCOSE 217 (H) 08/24/2023    ALKPHOS 59 08/24/2023    K 3.9 08/24/2023    PROT 6.0 (L) 08/24/2023     08/24/2023    AST 19 08/24/2023    ALT 22 08/24/2023    BUN 21 08/24/2023    ANIONGAP 13 08/24/2023    MG 1.51 (L) 08/24/2023    PHOS 3.0 01/30/2021    ALBUMIN 3.7 08/24/2023    AMYLASE 86 12/02/2020    LIPASE 122 (H) 12/02/2020    GFRF 64 08/24/2023    GFRMALE CANCELED 08/24/2023     Lab Results   Component Value Date    TRIG 335 (H) 04/28/2023    CHOL 211 (H) 04/28/2023    HDL 36.1 (A) 04/28/2023     Lab Results   Component Value Date    HGBA1C 6.7 (H) 12/02/2023    HGBA1C 9.7 (A) 09/01/2023    HGBA1C 8.5 (A) 04/28/2023     The 10-year ASCVD risk score (Duane ZHOU, et al., 2019) is: 26.6%    Values used to calculate the score:      Age: 63 years      Sex: Female      Is Non- : No      Diabetic: Yes      Tobacco smoker: No      Systolic Blood Pressure: 172 mmHg      Is BP treated: Yes      HDL Cholesterol: 36.1 mg/dL      Total Cholesterol: 211 mg/dL    Health Maintenance:   Foot Exam: None  Eye Exam: ~2 months ago   Lipid Panel:  mg/dL 04/28/23  Urine Albumin: Unable to calculate due to albumin <7 01/07/23  Influenza Vaccine: 10/19/2023  Pneumonia Vaccine: PPSV23 07/09/13    Drug Interactions:  Additive CNS Depression: lorazepam, tramadol, meclizine   Patient only uses Lorazepam for flying and will only use meclizine when needed for vertigo (states she rarely uses it)    Assessment/Plan   Problem List Items Addressed This Visit       Primary " hypertension     Blood Pressure monitor comes pre-validated for 2 years and did not need monitor validation today.    Blood pressure log/diary was not present during today's visit.   Smoker status: non-smoker   Blood pressure: well controlled  BP goal of <130/80 mmHg IS met   CONTINUE:   Atenolol 25 mg 1 tablet by mouth twice daily   Lisinopril 20 mg 1 tablet by mouth once daily   Triamterene-hydrochlorothiazide 37.5-25 mg 1 tablet by mouth once daily   Patient's blood pressures have continued to stay controlled, likely in part to her dietary changes and improved lifestyle. At this time her blood pressure is well controlled and she has no need for medication adjustments.   Counseling Points:   I have counseled this patient on appropriate blood pressure monitor techniques, provided a blood pressure monitor log, and have advised the patient to contact me with questions regarding their blood pressure.   Medications are properly dosed for renal and hepatic function.          Relevant Orders    Follow Up In [facility] - Pharmacy    Type 2 diabetes mellitus without complication, without long-term current use of insulin (CMS/[facility]) - Primary     Shwetha Madrigal has controlled type 2 diabetes mellitus complicated by hypertension, hyperlipidemia, and obesity as evidenced by her most recent A1c of 6.7% on 12/02/23 which had decreased significantly from 9.7% on 09/01/23. This decrease is due to patient making lifestyle changes including eating better, exercising more, and using the Freestyle Codi system to help monitor her blood sugars response to different foods.   CONTINUE:   Metformin 500 mg 2 tablets by mouth twice daily   Glyburide 5 mg 2 tablets by mouth twice daily   Codi 3 sensors apply 1 sensor to the back of arm for continuous glucose monitoring, remove and apply new sensor every 14 days   Compliance at present is estimated to be excellent.   We will follow-up in ~3 months to ensure her blood sugar is  staying controlled at that time.           Other Visit Diagnoses       Uncontrolled type 2 diabetes mellitus with hyperglycemia (CMS/[facility])        Relevant Orders    Follow Up In [facility] - Pharmacy        Pharmacy Follow-Up: 03/08/24  PCP Follow-Up: 02/19/24    Jorge Miller PharmD     Continue all meds under the continuation of care with the referring provider and clinical pharmacy team.

## 2023-12-08 NOTE — ASSESSMENT & PLAN NOTE
Blood Pressure monitor comes pre-validated for 2 years and did not need monitor validation today.    Blood pressure log/diary was not present during today's visit.   Smoker status: non-smoker   Blood pressure: well controlled  BP goal of <130/80 mmHg IS met   CONTINUE:   Atenolol 25 mg 1 tablet by mouth twice daily   Lisinopril 20 mg 1 tablet by mouth once daily   Triamterene-hydrochlorothiazide 37.5-25 mg 1 tablet by mouth once daily   Patient's blood pressures have continued to stay controlled, likely in part to her dietary changes and improved lifestyle. At this time her blood pressure is well controlled and she has no need for medication adjustments.   Counseling Points:   I have counseled this patient on appropriate blood pressure monitor techniques, provided a blood pressure monitor log, and have advised the patient to contact me with questions regarding their blood pressure.   Medications are properly dosed for renal and hepatic function.

## 2024-01-25 PROCEDURE — RXMED WILLOW AMBULATORY MEDICATION CHARGE

## 2024-01-27 ENCOUNTER — PHARMACY VISIT (OUTPATIENT)
Dept: PHARMACY | Facility: CLINIC | Age: 64
End: 2024-01-27
Payer: COMMERCIAL

## 2024-02-08 DIAGNOSIS — I10 BENIGN ESSENTIAL HYPERTENSION: ICD-10-CM

## 2024-02-08 RX ORDER — LISINOPRIL 20 MG/1
20 TABLET ORAL DAILY
Qty: 90 TABLET | Refills: 3 | Status: SHIPPED | OUTPATIENT
Start: 2024-02-08

## 2024-02-19 ENCOUNTER — OFFICE VISIT (OUTPATIENT)
Dept: PRIMARY CARE | Facility: CLINIC | Age: 64
End: 2024-02-19
Payer: COMMERCIAL

## 2024-02-19 VITALS
BODY MASS INDEX: 36.74 KG/M2 | SYSTOLIC BLOOD PRESSURE: 138 MMHG | DIASTOLIC BLOOD PRESSURE: 80 MMHG | HEART RATE: 67 BPM | WEIGHT: 217.4 LBS | OXYGEN SATURATION: 96 %

## 2024-02-19 DIAGNOSIS — E10.9 TYPE 1 DIABETES MELLITUS WITHOUT COMPLICATION (MULTI): Primary | ICD-10-CM

## 2024-02-19 DIAGNOSIS — E21.3 HYPERPARATHYROIDISM (MULTI): ICD-10-CM

## 2024-02-19 DIAGNOSIS — E11.9 TYPE 2 DIABETES MELLITUS WITHOUT COMPLICATION, WITHOUT LONG-TERM CURRENT USE OF INSULIN (MULTI): ICD-10-CM

## 2024-02-19 DIAGNOSIS — Z00.00 ANNUAL PHYSICAL EXAM: ICD-10-CM

## 2024-02-19 DIAGNOSIS — D32.9 MENINGIOMA (MULTI): ICD-10-CM

## 2024-02-19 DIAGNOSIS — E53.8 VITAMIN B12 DEFICIENCY: ICD-10-CM

## 2024-02-19 DIAGNOSIS — E83.42 HYPOMAGNESEMIA: ICD-10-CM

## 2024-02-19 DIAGNOSIS — I10 BENIGN ESSENTIAL HYPERTENSION: ICD-10-CM

## 2024-02-19 DIAGNOSIS — R79.89 LOW VITAMIN D LEVEL: ICD-10-CM

## 2024-02-19 PROBLEM — S46.019A STRAIN OF ROTATOR CUFF CAPSULE: Status: ACTIVE | Noted: 2023-09-12

## 2024-02-19 PROCEDURE — 1036F TOBACCO NON-USER: CPT | Performed by: INTERNAL MEDICINE

## 2024-02-19 PROCEDURE — 3079F DIAST BP 80-89 MM HG: CPT | Performed by: INTERNAL MEDICINE

## 2024-02-19 PROCEDURE — 3075F SYST BP GE 130 - 139MM HG: CPT | Performed by: INTERNAL MEDICINE

## 2024-02-19 PROCEDURE — 99214 OFFICE O/P EST MOD 30 MIN: CPT | Performed by: INTERNAL MEDICINE

## 2024-02-19 PROCEDURE — 4010F ACE/ARB THERAPY RXD/TAKEN: CPT | Performed by: INTERNAL MEDICINE

## 2024-02-19 PROCEDURE — 3008F BODY MASS INDEX DOCD: CPT | Performed by: INTERNAL MEDICINE

## 2024-02-19 RX ORDER — CYANOCOBALAMIN 1000 UG/ML
1000 INJECTION, SOLUTION INTRAMUSCULAR; SUBCUTANEOUS
Qty: 12 ML | Refills: 0 | Status: SHIPPED | OUTPATIENT
Start: 2024-02-19 | End: 2025-02-18

## 2024-02-19 ASSESSMENT — PATIENT HEALTH QUESTIONNAIRE - PHQ9
1. LITTLE INTEREST OR PLEASURE IN DOING THINGS: NOT AT ALL
SUM OF ALL RESPONSES TO PHQ9 QUESTIONS 1 AND 2: 0
2. FEELING DOWN, DEPRESSED OR HOPELESS: NOT AT ALL

## 2024-02-19 NOTE — PROGRESS NOTES
Subjective   Patient ID: Shwetha Madrigal is a 63 y.o. female who presents for 3 month follow up .    Here for follow up,she sees Jorge,pharmacist,done well except over the holiday.  She has HTN,DM-2,HLD,fatty liver.  She still has her vertigo since her covid 2 years ago.she has a desk job.  She saw several specialist.  She sees ophthalmologist for eye exam.         Review of Systems   Respiratory: Negative.     Cardiovascular: Negative.    Neurological:  Positive for dizziness.       Objective   /80 (BP Location: Right arm, Patient Position: Sitting)   Pulse 67   Wt 98.6 kg (217 lb 6.4 oz)   SpO2 96%   BMI 36.74 kg/m²     Physical Exam  Constitutional:       Appearance: Normal appearance.   HENT:      Head: Normocephalic and atraumatic.   Eyes:      Extraocular Movements: Extraocular movements intact.      Pupils: Pupils are equal, round, and reactive to light.   Cardiovascular:      Rate and Rhythm: Normal rate and regular rhythm.      Heart sounds: Normal heart sounds.   Pulmonary:      Effort: Pulmonary effort is normal.      Breath sounds: Normal breath sounds. No wheezing or rhonchi.   Abdominal:      General: Abdomen is flat. Bowel sounds are normal. There is no distension.      Palpations: Abdomen is soft.   Musculoskeletal:      Cervical back: Normal range of motion and neck supple.      Right lower leg: No edema.      Left lower leg: No edema.      Comments: Using her cane for ambulation.   Skin:     General: Skin is warm.   Neurological:      General: No focal deficit present.      Mental Status: She is alert and oriented to person, place, and time.   Psychiatric:         Mood and Affect: Mood normal.         Behavior: Behavior normal.         Assessment/Plan   Problem List Items Addressed This Visit             ICD-10-CM    Vitamin B12 deficiency E53.8    Relevant Medications    cyanocobalamin (Vitamin B-12) 1,000 mcg/mL injection    Other Relevant Orders    Vitamin B12    Annual physical  exam Z00.00    Relevant Orders    CBC    Comprehensive Metabolic Panel    Lipid Panel    TSH with reflex to Free T4 if abnormal    Albumin , Urine Random    Hemoglobin A1C    Hyperparathyroidism (CMS/HCC) E21.3    Relevant Orders    CBC    Hypomagnesemia E83.42    Relevant Orders    Magnesium    Low vitamin D level R79.89    Relevant Orders    Vitamin D 25-Hydroxy,Total (for eval of Vitamin D levels)    Meningioma (CMS/HCC) D32.9    Relevant Orders    CBC    Benign essential hypertension I10     Stable on current medication.         Type 2 diabetes mellitus (CMS/HCC) E11.9     Other Visit Diagnoses         Codes    Type 1 diabetes mellitus without complication (CMS/HCC)    -  Primary E10.9    Relevant Orders    CBC

## 2024-02-20 DIAGNOSIS — I10 PRIMARY HYPERTENSION: ICD-10-CM

## 2024-02-20 DIAGNOSIS — E03.9 ACQUIRED HYPOTHYROIDISM: ICD-10-CM

## 2024-02-20 RX ORDER — ATENOLOL 25 MG/1
25 TABLET ORAL 2 TIMES DAILY
Qty: 180 TABLET | Refills: 3 | Status: SHIPPED | OUTPATIENT
Start: 2024-02-20

## 2024-02-20 RX ORDER — LEVOTHYROXINE SODIUM 50 UG/1
50 TABLET ORAL DAILY
Qty: 90 TABLET | Refills: 3 | Status: SHIPPED | OUTPATIENT
Start: 2024-02-20 | End: 2024-05-17 | Stop reason: SDUPTHER

## 2024-02-22 ASSESSMENT — ENCOUNTER SYMPTOMS
CARDIOVASCULAR NEGATIVE: 1
RESPIRATORY NEGATIVE: 1
DIZZINESS: 1

## 2024-02-23 DIAGNOSIS — R79.89 LOW VITAMIN D LEVEL: ICD-10-CM

## 2024-02-23 RX ORDER — ERGOCALCIFEROL 1.25 MG/1
1 CAPSULE ORAL
Qty: 4 CAPSULE | Refills: 3 | Status: SHIPPED | OUTPATIENT
Start: 2024-02-23

## 2024-03-07 ASSESSMENT — ENCOUNTER SYMPTOMS
DIABETIC ASSOCIATED SYMPTOMS: 0
HYPERTENSION: 1

## 2024-03-08 ENCOUNTER — TELEMEDICINE (OUTPATIENT)
Dept: PHARMACY | Facility: HOSPITAL | Age: 64
End: 2024-03-08
Payer: COMMERCIAL

## 2024-03-08 DIAGNOSIS — E11.9 TYPE 2 DIABETES MELLITUS WITHOUT COMPLICATION, WITHOUT LONG-TERM CURRENT USE OF INSULIN (MULTI): ICD-10-CM

## 2024-03-08 DIAGNOSIS — E11.65 UNCONTROLLED TYPE 2 DIABETES MELLITUS WITH HYPERGLYCEMIA (MULTI): Primary | ICD-10-CM

## 2024-03-08 DIAGNOSIS — I10 PRIMARY HYPERTENSION: ICD-10-CM

## 2024-03-08 NOTE — ASSESSMENT & PLAN NOTE
Blood Pressure monitor comes pre-validated for 2 years and did not need monitor validation  Blood pressure log/diary was not present during today's visit.   Smoker status: non-smoker   Blood pressure: uncontrolled  BP goal of <130/80 mmHg is NOT met with home readings  CONTINUE   Atenolol 25 mg 1 tablet by mouth twice daily   Lisinopril 20 mg 1 tablet by mouth once daily   Triamterene-hydrochlorothiazide 37.5-25 mg 1 tablet by mouth once daily   Patient reports her blood pressures have increased along with her blood sugars due to not watching her diet as closely over the past few months. She states she wants to go back to her previous diet which led to blood pressures in the 120s/70s-80s. She will stay on her current medications and we will follow-up to see improvement. If she continues to not show improvement she may need a dose adjustment in the future.   Counseling Points:   I have counseled this patient on appropriate blood pressure monitor techniques, provided a blood pressure monitor log, and have advised the patient to contact me with questions regarding their blood pressure.   Medications are properly dosed for renal and hepatic function.

## 2024-03-08 NOTE — PROGRESS NOTES
Patient is sent at the request of Whit Kenyon MD for my opinion regarding Type 2 diabetes and hypertension.  My final recommendations will be communicated back to the requesting provider by way of shared medical record.    Subjective     Diabetes  She presents for her follow-up diabetic visit. She has type 2 diabetes mellitus. The initial diagnosis of diabetes was made 16 years ago. Her disease course has been worsening. There are no hypoglycemic associated symptoms. There are no diabetic associated symptoms. (Notices more symptoms when she eats higher carbohydrates) There are no hypoglycemic complications. Symptoms are stable. There are no diabetic complications. Risk factors for coronary artery disease include diabetes mellitus, dyslipidemia, hypertension and obesity. Current diabetic treatment includes oral agent (dual therapy) (Metformin and Glyburide). She is compliant with treatment all of the time. Her weight is decreasing steadily. She is following a generally healthy diet. She rarely participates in exercise. Her overall blood glucose range is 110-130 mg/dl. An ACE inhibitor/angiotensin II receptor blocker is being taken. She does not see a podiatrist.Eye exam is current.   Hypertension  This is a chronic problem. The current episode started more than 1 year ago. The problem has been gradually improving since onset. The problem is uncontrolled. Agents associated with hypertension include estrogens, thyroid hormones and NSAIDs. Risk factors for coronary artery disease include diabetes mellitus, dyslipidemia and obesity. Past treatments include calcium channel blockers. The current treatment provides mild improvement. There are no compliance problems.  Identifiable causes of hypertension include a hypertension causing med and a thyroid problem.     When patient started with the Codi she was on a very strict diet with protein and vegetables. States that she has been trying other foods to see how they  affect her blood sugar which has led to an increase in her blood sugars. Knows she will need to get back to eating protein with vegetables and sticking with her previous diet.     Current diet:   Trying to eat plant based with no processed foods   Breakfast: takes some pretzels with her medications in the morning, coffee with half and half creamer, low sugar instant oatmeal at work, will also usually have a banana   Wants to try to eat an egg in the morning with her medications instead of pretzels  Lunch: tries to stick to salads with some protein, tries not to use bottled dressing, packs her lunch   Dinner: trying to eat salads as much as she can, will sometimes have sausage with them, kielbasa and sour kraut with mashed potatoes  Snacks: does snack on pretzels in between meals, avoids sweets, sometimes crackers with spread-able cheese  Fluids: water, hot tea  Still has not been eating fast food or out at restaurants  States that she has been experimenting with different foods with carbs to see how they affect her blood sugar    Current exercise:   Has 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 which limits her ability to exercise and walk throughout the day     Sodium intake:   Sometimes makes soups with canned black beans and green beans and other vegetables  Snacks on pretzels and crackers  Does not add salt to meals, will sometimes add salt to meals when cooking     Allergies   Allergen Reactions    Doxycycline Hives    Sulfamethoxazole-Trimethoprim Hives    Cephalexin Itching and Rash     Pruritus    Penicillins Headache, Unknown and Rash     rash    headache    rash   headache     Hypertension:    Blood Pressure Monitor Device: Omron purchased from our [facility] ~1 month ago    Affordability/Accessibility: No issues   Adherence/Organization: Uses a 2-week pill box   Have you missed any doses of your antihypertensives? No  Adverse Effects: Reports no issues     SMBP Measurements:   Averaging 140s/80s at home  "  Patient did not have log with her today   Blood pressures have increased as well after patient's diet changes     Has the patient experienced any low blood pressures since last contact? No  denies symptoms of low blood pressure: lightheadedness, dizziness, falls, blurry vision, clammy/pale skin     Has the patient experienced any high blood pressures since last contact? No  denies symptoms of high blood pressure: headache, chest pain, vision problems    Diabetes  The patient does have a known family history of diabetes (mother)     Patient is using: continuous glucose monitor (Codi 3) to self monitor blood sugar at home   Since last pharmacy encounter patient's time in range has worsened from 75% over the 30 days prior to last appointment on 12/08/23 to 26% over the last 30 days           Hypoglycemia frequency: none   Hypoglycemia awareness: Yes     Objective     Wt Readings from Last 3 Encounters:   02/19/24 98.6 kg (217 lb 6.4 oz)   11/01/23 97.4 kg (214 lb 12.8 oz)   10/04/23 103 kg (226 lb 3.2 oz)     Estimated body mass index is 36.74 kg/m² as calculated from the following:    Height as of 9/1/23: 1.638 m (5' 4.5\").    Weight as of 2/19/24: 98.6 kg (217 lb 6.4 oz).    Diabetes Pharmacotherapy:  Glyburide 5 mg 2 tablets twice daily   Metformin 1000 mg twice daily     Historical Diabetes Pharmacotherapy:   Januvia 25 mg     Hypertension Pharmacotherapy:  Atenolol 25 mg 1 tablet by mouth twice daily   Lisinopril 20 mg once daily   Triamterene-hydrochlorothiazide 37.5-25 mg once daily     Historical Hypertension Pharmacotherapy:   Amlodipine (did not tolerate due to edema)    SECONDARY PREVENTION  Statin? No  ACE-I/ARB? No  Aspirin? No    Pertinent PMH Review:  PMH of Pancreatitis: No  PMH of Retinopathy: No  PMH of Urinary Tract Infections: Yes, does not get frequently   PMH of MTC: No    Medication Reconciliation  Removed: Moxifloxacin 0.5% ophthalmic solution (therapy completed)    Current Outpatient " Medications on File Prior to Visit   Medication Sig Dispense Refill    alcohol swabs pads, medicated Use as needed. 100 each 1    atenolol (Tenormin) 25 mg tablet Take 1 tablet (25 mg) by mouth 2 times a day. 180 tablet 3    blood sugar diagnostic (Contour Next Test Strips) strip twice a day.      blood-glucose sensor (FreeStyle Codi 3 Sensor) device Apply 1 sensor to the back of the arm for continuous glucose monitoring. Remove and apply new sensor every 14 days 6 each 3    clobetasoL-emollient 0.05 % cream APPY AND GENTLY MASSAGE INTO AFFECTED AREA(S) TWICE DAILY AS NEEDED FOR ITCHING 60 g 3    cyanocobalamin (Vitamin B-12) 1,000 mcg/mL injection Inject 1 mL (1,000 mcg) into the muscle every 30 (thirty) days. 12 mL 0    ergocalciferol (Vitamin D-2) 1.25 MG (75365 UT) capsule Take 1 capsule (1,250 mcg) by mouth 1 (one) time per week. 4 capsule 3    estradiol (Estrace) 0.1 MG/GM vaginal cream APPLY 0.5 GRAMS VAGINALLY WEEKLY AS DIRECTED 42.5 g 3    fluticasone (Flonase) 50 mcg/actuation nasal spray Administer 2 sprays into affected nostril(s) once daily as needed.      glyBURIDE (Diabeta) 5 mg tablet Take 2 tablets (10 mg) by mouth 2 times a day. 360 tablet 2    lancets (OneTouch Delica Plus Lancet) 33 gauge misc USE TO TEST TWICE DAILY. 200 each 3    levothyroxine (Synthroid, Levoxyl) 50 mcg tablet Take 1 tablet (50 mcg) by mouth once daily. 90 tablet 3    lisinopril 20 mg tablet Take 1 tablet (20 mg) by mouth once daily. 90 tablet 3    magnesium oxide (Mag-Ox) 250 mg magnesium tablet Take 1 tablet (250 mg) by mouth 2 times a day.      meclizine (Antivert) 12.5 mg tablet TAKE 1 TO 2 TABLETS BY MOUTH EVERY 4 TO 6 HOURS AS NEEDED FOR DIZZINESS. CAN CAUSE DROWSINESS      metFORMIN (Glucophage) 500 mg tablet Take 2 tablets (1,000 mg) by mouth 2 times a day. 360 tablet 3    nystatin-triamcinolone (Mycolog II) cream Apply 1 Application topically 3 times a day as needed. 60 g 1    OneTouch Verio Meter misc test as  "directed      OneTouch Verio test strips strip TEST BLOOD GLUCOSE TWICE DAILY. 200 strip 3    Prolensa 0.07 % drops 1 drop. To the affected eye once daily for 4 weeks      syringe with needle (BD Luer-Adrian Syringe) 3 mL 25 gauge x 1\" syringe USE FOR VITAMIN B12 INJECTIONS ONCE A MONTH 4 each 3    triamterene-hydrochlorothiazid (Maxzide-25) 37.5-25 mg tablet Take 1 tablet by mouth once daily. Take with food. 90 tablet 2    [DISCONTINUED] moxifloxacin (Vigamox) 0.5 % ophthalmic solution        No current facility-administered medications on file prior to visit.      Lab Review  Lab Results   Component Value Date    BILITOT 0.6 08/24/2023    CALCIUM 10.1 08/24/2023    CO2 26 08/24/2023     08/24/2023    CREATININE 0.99 08/24/2023    GLUCOSE 217 (H) 08/24/2023    ALKPHOS 59 08/24/2023    K 3.9 08/24/2023    PROT 6.0 (L) 08/24/2023     08/24/2023    AST 19 08/24/2023    ALT 22 08/24/2023    BUN 21 08/24/2023    ANIONGAP 13 08/24/2023    MG 1.51 (L) 08/24/2023    PHOS 3.0 01/30/2021    ALBUMIN 3.7 08/24/2023    AMYLASE 86 12/02/2020    LIPASE 122 (H) 12/02/2020    GFRF 64 08/24/2023    GFRMALE CANCELED 08/24/2023     Lab Results   Component Value Date    TRIG 335 (H) 04/28/2023    CHOL 211 (H) 04/28/2023    HDL 36.1 (A) 04/28/2023     Lab Results   Component Value Date    HGBA1C 6.7 (H) 12/02/2023    HGBA1C 9.7 (A) 09/01/2023    HGBA1C 8.5 (A) 04/28/2023     The 10-year ASCVD risk score (Duane ZHOU, et al., 2019) is: 18%    Values used to calculate the score:      Age: 63 years      Sex: Female      Is Non- : No      Diabetic: Yes      Tobacco smoker: No      Systolic Blood Pressure: 138 mmHg      Is BP treated: Yes      HDL Cholesterol: 36.1 mg/dL      Total Cholesterol: 211 mg/dL    Health Maintenance:   Foot Exam: None  Eye Exam: ~2 months ago   Lipid Panel:  mg/dL 04/28/23  Urine Albumin: Unable to calculate due to albumin <7 01/07/23  Influenza Vaccine: 10/19/2023  Pneumonia " Vaccine: PPSV23 07/09/13    Drug Interactions:  Additive CNS Depression: lorazepam, tramadol, meclizine   Patient only uses Lorazepam for flying and will only use meclizine when needed for vertigo (states she rarely uses it)    Assessment/Plan   Problem List Items Addressed This Visit       Primary hypertension     Blood Pressure monitor comes pre-validated for 2 years and did not need monitor validation  Blood pressure log/diary was not present during today's visit.   Smoker status: non-smoker   Blood pressure: uncontrolled  BP goal of <130/80 mmHg is NOT met with home readings  CONTINUE   Atenolol 25 mg 1 tablet by mouth twice daily   Lisinopril 20 mg 1 tablet by mouth once daily   Triamterene-hydrochlorothiazide 37.5-25 mg 1 tablet by mouth once daily   Patient reports her blood pressures have increased along with her blood sugars due to not watching her diet as closely over the past few months. She states she wants to go back to her previous diet which led to blood pressures in the 120s/70s-80s. She will stay on her current medications and we will follow-up to see improvement. If she continues to not show improvement she may need a dose adjustment in the future.   Counseling Points:   I have counseled this patient on appropriate blood pressure monitor techniques, provided a blood pressure monitor log, and have advised the patient to contact me with questions regarding their blood pressure.   Medications are properly dosed for renal and hepatic function.          Relevant Orders    Follow Up In [facility] - Pharmacy    Type 2 diabetes mellitus without complication, without long-term current use of insulin (CMS/[facility])     Shwetha Madrigal has uncontrolled type 2 diabetes mellitus complicated by hypertension, hyperlipidemia, and obesity as evidenced by her time in range over the past 30 days which is now at 26% which is below goal of >70%. This increase in blood sugar is due to patient not watching her  diet as much over the past few months.   Patient expressed that she was trying different foods to see how they affected her blood sugar and has learned that the strict diet she was previously on is the best to keep her blood sugars in range. She will work on improving her diet to help improve her blood sugars again.   CONTINUE:   Metformin 500 mg 2 tablets by mouth twice daily   Glyburide 5 mg 2 tablets by mouth twice daily   She will continue using the Codi sensors to help monitor her blood sugars in response to food.   If her Codi report does not show improvement at next follow-up we will discuss adding an additional medication to help with blood sugar at that time. If she does not want to start an injectable medication we can discuss starting SGLT2 inhibitors such as Jardiance or Farxiga or DPP4 inhibitors such as Januvia or Tradjenta.   We will follow-up in ~5 weeks.           Other Visit Diagnoses       Uncontrolled type 2 diabetes mellitus with hyperglycemia (CMS/HCC)    -  Primary    Relevant Orders    Follow Up In [facility] - Pharmacy          Pharmacy Follow-Up: 04/12/24  PCP Follow-Up: 05/03/24    Jorge Miller PharmD     Continue all meds under the continuation of care with the referring provider and clinical pharmacy team.

## 2024-03-08 NOTE — ASSESSMENT & PLAN NOTE
Shwetha Madrigal has uncontrolled type 2 diabetes mellitus complicated by hypertension, hyperlipidemia, and obesity as evidenced by her time in range over the past 30 days which is now at 26% which is below goal of >70%. This increase in blood sugar is due to patient not watching her diet as much over the past few months.   Patient expressed that she was trying different foods to see how they affected her blood sugar and has learned that the strict diet she was previously on is the best to keep her blood sugars in range. She will work on improving her diet to help improve her blood sugars again.   CONTINUE:   Metformin 500 mg 2 tablets by mouth twice daily   Glyburide 5 mg 2 tablets by mouth twice daily   She will continue using the Codi sensors to help monitor her blood sugars in response to food.   If her Codi report does not show improvement at next follow-up we will discuss adding an additional medication to help with blood sugar at that time. If she does not want to start an injectable medication we can discuss starting SGLT2 inhibitors such as Jardiance or Farxiga or DPP4 inhibitors such as Januvia or Tradjenta.   We will follow-up in ~5 weeks.

## 2024-03-25 DIAGNOSIS — E11.69 TYPE 2 DIABETES MELLITUS WITH OTHER SPECIFIED COMPLICATION, UNSPECIFIED WHETHER LONG TERM INSULIN USE (MULTI): ICD-10-CM

## 2024-03-25 RX ORDER — LANCETS 33 GAUGE
EACH MISCELLANEOUS
Qty: 200 EACH | Refills: 3 | Status: SHIPPED | OUTPATIENT
Start: 2024-03-25

## 2024-03-27 DIAGNOSIS — E11.69 TYPE 2 DIABETES MELLITUS WITH OTHER SPECIFIED COMPLICATION, UNSPECIFIED WHETHER LONG TERM INSULIN USE (MULTI): ICD-10-CM

## 2024-03-27 RX ORDER — BLOOD-GLUCOSE METER
EACH MISCELLANEOUS
Qty: 200 STRIP | Refills: 3 | Status: SHIPPED | OUTPATIENT
Start: 2024-03-27

## 2024-04-12 ENCOUNTER — TELEMEDICINE (OUTPATIENT)
Dept: PHARMACY | Facility: HOSPITAL | Age: 64
End: 2024-04-12
Payer: COMMERCIAL

## 2024-04-12 DIAGNOSIS — I10 PRIMARY HYPERTENSION: ICD-10-CM

## 2024-04-12 DIAGNOSIS — E11.65 UNCONTROLLED TYPE 2 DIABETES MELLITUS WITH HYPERGLYCEMIA (MULTI): Primary | ICD-10-CM

## 2024-04-12 DIAGNOSIS — E11.9 TYPE 2 DIABETES MELLITUS WITHOUT COMPLICATION, WITHOUT LONG-TERM CURRENT USE OF INSULIN (MULTI): ICD-10-CM

## 2024-04-12 ASSESSMENT — ENCOUNTER SYMPTOMS
HEADACHES: 0
NECK PAIN: 0
DIABETIC ASSOCIATED SYMPTOMS: 0
HYPERTENSION: 1

## 2024-04-12 NOTE — ASSESSMENT & PLAN NOTE
Shwetha Madrigal has uncontrolled but improving type 2 diabetes mellitus complicated by hypertension, hyperlipidemia, and obesity as evidenced by her time in range over the past 28 days which is now at 40% which is below goal of >70% but has improved from 26% at last pharmacy encounter. Patient reports that she has decreased her carbohydrate intake and has been eating more protein and vegetables. She states she is still experimenting with different foods to see how they impact her blood sugar on the Codi which has led to some higher peaks still recently.   Patient believes she still has room to improve with her diet and can continue to cut out more carbohydrates. We discussed making realistic changes that she would be able to maintain long-term vs realizing when a drastic change may not be realistic.   CONTINUE:   Glyburide 5 mg 2 tablets by mouth twice daily   Metformin 1,000 mg 1 tablet by mouth twice daily   Patient will continue working on her diet changes for another month, as she states she sees Dr. Kenyon in a few weeks and will have an updated A1c completed. We discussed that at that time if her Codi report does not show significant improvement we will have to start additional medication therapy or increase her Metformin at that time.  We will follow-up in ~5 weeks in mid-May to go over her reports at that time.

## 2024-04-12 NOTE — ASSESSMENT & PLAN NOTE
Blood Pressure monitor comes pre-validated for 2 years and did not need monitor validation   Blood pressure log/diary was not present during today's visit.   Smoker status: non-smoker   Blood pressure: controlled   BP goal of <130/80 mmHg is met based on patient reporting her readings are typically in the 120s/70s now.  CONTINUE:   Atenolol 25 mg 1 tablet by mouth twice daily   Lisinopril 20 mg 1 tablet by mouth once daily   Triamterene-hydrochlorothiazide 37.5-25 mg 1 tablet by mouth once daily   Patient's blood pressures have improved back down to goal.   Counseling Points:   I have counseled this patient on appropriate blood pressure monitor techniques, provided a blood pressure monitor log, and have advised the patient to contact me with questions regarding their blood pressure.    Medications are properly dosed for renal and hepatic function.

## 2024-04-12 NOTE — PROGRESS NOTES
Patient is sent at the request of Whit Kenyon MD for my opinion regarding Type 2 diabetes and hypertension.  My final recommendations will be communicated back to the requesting provider by way of shared medical record.    Subjective     Diabetes  She presents for her follow-up diabetic visit. She has type 2 diabetes mellitus. The initial diagnosis of diabetes was made 16 years ago. Her disease course has been improving. There are no hypoglycemic associated symptoms. Pertinent negatives for hypoglycemia include no headaches. There are no diabetic associated symptoms. Pertinent negatives for diabetes include no chest pain. (Notices more symptoms when she eats higher carbohydrates) There are no hypoglycemic complications. Symptoms are stable. There are no diabetic complications. Risk factors for coronary artery disease include diabetes mellitus, dyslipidemia, hypertension and obesity. Current diabetic treatment includes oral agent (dual therapy) (Metformin and Glyburide). She is compliant with treatment all of the time. Her weight is decreasing steadily. She is following a generally healthy diet. She rarely participates in exercise. Her overall blood glucose range is >200 mg/dl. An ACE inhibitor/angiotensin II receptor blocker is being taken. She does not see a podiatrist.Eye exam is current.   Hypertension  This is a chronic problem. The current episode started more than 1 year ago. The problem has been gradually improving since onset. The problem is uncontrolled. Pertinent negatives include no chest pain, headaches or neck pain. Agents associated with hypertension include estrogens, thyroid hormones and NSAIDs. Risk factors for coronary artery disease include diabetes mellitus, dyslipidemia and obesity. Past treatments include calcium channel blockers. The current treatment provides mild improvement. There are no compliance problems.  Identifiable causes of hypertension include a hypertension causing med and  a thyroid problem.     When patient started with the Codi she was on a very strict diet with protein and vegetables which led to her A1c decreasing down to 6.7% in December 2023. At last pharmacy encounter her time in range had decreased down to 26% with an average blood sugar of 222 mg/dL over the past 4 weeks prior to that encounter. She stated she had been more laid back with what she was eating and testing different foods to see how her blood sugars react. We discussed starting an additional medication at that time to help with blood sugars but patient was still hesitant. At last encounter patient was made aware that if her blood sugars are still uncontrolled we will need to discuss starting additional therapy. We are meeting today to go over her recent codi report and discuss medication options if needed.     Her blood pressures were also increasing at last pharmacy encounter. So far her elevated blood pressure has correlated with elevated blood sugar, as her blood pressures decreased when her A1c decreased down to 6.7%.     Current diet:   Trying to eat plant based with no processed foods   Breakfast: takes some pretzels with her medications in the morning, coffee with half and half creamer, low sugar instant oatmeal at work, will also usually have a banana   Wants to try to eat an egg in the morning with her medications instead of pretzels  Lunch: tries to stick to salads with some protein, tries not to use bottled dressing, packs her lunch   Dinner: trying to eat salads as much as she can, will sometimes have sausage with them, kielbasa and sour kraut with mashed potatoes  Snacks: does snack on pretzels in between meals, avoids sweets, sometimes crackers with spread-able cheese  Fluids: water, hot tea  Still has not been eating fast food or out at restaurants  Since last encounter has been eating less carbs and watching what she eats     Current exercise:   Has 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 which limits  her ability to exercise and walk throughout the day     Sodium intake:   Sometimes makes soups with canned black beans and green beans and other vegetables  Snacks on pretzels and crackers  Does not add salt to meals, will sometimes add salt to meals when cooking     Allergies   Allergen Reactions    Doxycycline Hives    Sulfamethoxazole-Trimethoprim Hives    Cephalexin Itching and Rash     Pruritus    Penicillins Headache, Unknown and Rash     rash    headache    rash   headache     Hypertension:    Blood Pressure Monitor Device: Omron purchased from our [facility] ~6 months ago    Affordability/Accessibility: No issues   Adherence/Organization: Uses a 2-week pill box   Have you missed any doses of your antihypertensives? No  Adverse Effects: Reports no issues     SMBP Measurements:   Averaging 120s/70s  Patient did not have log with her today   Blood pressures have improved since last telephone encounter     Has the patient experienced any low blood pressures since last contact? No  denies symptoms of low blood pressure: lightheadedness, dizziness, falls, blurry vision, clammy/pale skin     Has the patient experienced any high blood pressures since last contact? No  denies symptoms of high blood pressure: headache, chest pain, vision problems    Diabetes  The patient does have a known family history of diabetes (mother)     Patient is using: continuous glucose monitor (Codi 3) to self monitor blood sugar at home   Full 4-week CGM report attached to encounter today   Since last pharmacy encounter patient's time in range has improved from 26% over the 28 days prior to last appointment to 40% over the past 28 days         Hypoglycemia frequency: 0%  Hypoglycemia awareness: Yes     Objective     Wt Readings from Last 3 Encounters:   02/19/24 98.6 kg (217 lb 6.4 oz)   11/01/23 97.4 kg (214 lb 12.8 oz)   10/04/23 103 kg (226 lb 3.2 oz)     Estimated body mass index is 36.74 kg/m² as calculated from the  "following:    Height as of 9/1/23: 1.638 m (5' 4.5\").    Weight as of 2/19/24: 98.6 kg (217 lb 6.4 oz).    Diabetes Pharmacotherapy:  Glyburide 5 mg 2 tablets twice daily   Metformin 1000 mg twice daily     Historical Diabetes Pharmacotherapy:   Januvia 25 mg     Hypertension Pharmacotherapy:  Atenolol 25 mg 1 tablet by mouth twice daily   Lisinopril 20 mg once daily   Triamterene-hydrochlorothiazide 37.5-25 mg once daily     Historical Hypertension Pharmacotherapy:   Amlodipine (did not tolerate due to edema)    SECONDARY PREVENTION  Statin? No  ACE-I/ARB? No  Aspirin? No    Pertinent PMH Review:  PMH of Pancreatitis: No  PMH of Retinopathy: No  PMH of Urinary Tract Infections: Yes, does not get frequently   PMH of MTC: No    Medication Reconciliation  Removed: Moxifloxacin 0.5% ophthalmic solution (therapy completed)    Current Outpatient Medications on File Prior to Visit   Medication Sig Dispense Refill    alcohol swabs pads, medicated Use as needed. 100 each 1    atenolol (Tenormin) 25 mg tablet Take 1 tablet (25 mg) by mouth 2 times a day. 180 tablet 3    blood sugar diagnostic (Contour Next Test Strips) strip twice a day.      blood sugar diagnostic (OneTouch Verio test strips) strip TEST BLOOD GLUCOSE TWICE DAILY. 200 strip 3    blood-glucose sensor (FreeStyle Codi 3 Sensor) device Apply 1 sensor to the back of the arm for continuous glucose monitoring. Remove and apply new sensor every 14 days 6 each 3    clobetasoL-emollient 0.05 % cream APPY AND GENTLY MASSAGE INTO AFFECTED AREA(S) TWICE DAILY AS NEEDED FOR ITCHING 60 g 3    cyanocobalamin (Vitamin B-12) 1,000 mcg/mL injection Inject 1 mL (1,000 mcg) into the muscle every 30 (thirty) days. 12 mL 0    ergocalciferol (Vitamin D-2) 1.25 MG (46958 UT) capsule Take 1 capsule (1,250 mcg) by mouth 1 (one) time per week. 4 capsule 3    estradiol (Estrace) 0.1 MG/GM vaginal cream APPLY 0.5 GRAMS VAGINALLY WEEKLY AS DIRECTED 42.5 g 3    fluticasone (Flonase) 50 " "mcg/actuation nasal spray Administer 2 sprays into affected nostril(s) once daily as needed.      glyBURIDE (Diabeta) 5 mg tablet Take 2 tablets (10 mg) by mouth 2 times a day. 360 tablet 2    lancets (OneTouch Delica Plus Lancet) 33 gauge misc USE TO TEST TWICE DAILY. 200 each 3    levothyroxine (Synthroid, Levoxyl) 50 mcg tablet Take 1 tablet (50 mcg) by mouth once daily. 90 tablet 3    lisinopril 20 mg tablet Take 1 tablet (20 mg) by mouth once daily. 90 tablet 3    magnesium oxide (Mag-Ox) 250 mg magnesium tablet Take 1 tablet (250 mg) by mouth 2 times a day.      meclizine (Antivert) 12.5 mg tablet TAKE 1 TO 2 TABLETS BY MOUTH EVERY 4 TO 6 HOURS AS NEEDED FOR DIZZINESS. CAN CAUSE DROWSINESS      metFORMIN (Glucophage) 500 mg tablet Take 2 tablets (1,000 mg) by mouth 2 times a day. 360 tablet 3    nystatin-triamcinolone (Mycolog II) cream Apply 1 Application topically 3 times a day as needed. 60 g 1    OneTouch Verio Meter misc test as directed      Prolensa 0.07 % drops 1 drop. To the affected eye once daily for 4 weeks      syringe with needle (BD Luer-Adrian Syringe) 3 mL 25 gauge x 1\" syringe USE FOR VITAMIN B12 INJECTIONS ONCE A MONTH 4 each 3    triamterene-hydrochlorothiazid (Maxzide-25) 37.5-25 mg tablet Take 1 tablet by mouth once daily. Take with food. 90 tablet 2     No current facility-administered medications on file prior to visit.      Lab Review  Lab Results   Component Value Date    BILITOT 0.6 08/24/2023    CALCIUM 10.1 08/24/2023    CO2 26 08/24/2023     08/24/2023    CREATININE 0.99 08/24/2023    GLUCOSE 217 (H) 08/24/2023    ALKPHOS 59 08/24/2023    K 3.9 08/24/2023    PROT 6.0 (L) 08/24/2023     08/24/2023    AST 19 08/24/2023    ALT 22 08/24/2023    BUN 21 08/24/2023    ANIONGAP 13 08/24/2023    MG 1.51 (L) 08/24/2023    PHOS 3.0 01/30/2021    ALBUMIN 3.7 08/24/2023    AMYLASE 86 12/02/2020    LIPASE 122 (H) 12/02/2020    GFRF 64 08/24/2023    GFRMALE CANCELED 08/24/2023     Lab " Results   Component Value Date    TRIG 335 (H) 04/28/2023    CHOL 211 (H) 04/28/2023    HDL 36.1 (A) 04/28/2023     Lab Results   Component Value Date    HGBA1C 6.7 (H) 12/02/2023    HGBA1C 9.7 (A) 09/01/2023    HGBA1C 8.5 (A) 04/28/2023     The 10-year ASCVD risk score (Duane ZHOU, et al., 2019) is: 19.4%    Values used to calculate the score:      Age: 64 years      Sex: Female      Is Non- : No      Diabetic: Yes      Tobacco smoker: No      Systolic Blood Pressure: 138 mmHg      Is BP treated: Yes      HDL Cholesterol: 36.1 mg/dL      Total Cholesterol: 211 mg/dL    Health Maintenance:   Foot Exam: None  Eye Exam: ~8 months ago   Lipid Panel:  mg/dL,  mg/dL 04/28/23  Urine Albumin: Unable to calculate due to albumin <7 01/07/23  Influenza Vaccine: 10/19/2023  Pneumonia Vaccine: PPSV23 07/09/13    Drug Interactions:  Additive CNS Depression: lorazepam, tramadol, meclizine   Patient only uses Lorazepam for flying and will only use meclizine when needed for vertigo (states she rarely uses it)    Assessment/Plan   Problem List Items Addressed This Visit       Primary hypertension     Blood Pressure monitor comes pre-validated for 2 years and did not need monitor validation   Blood pressure log/diary was not present during today's visit.   Smoker status: non-smoker   Blood pressure: controlled   BP goal of <130/80 mmHg is met based on patient reporting her readings are typically in the 120s/70s now.  CONTINUE:   Atenolol 25 mg 1 tablet by mouth twice daily   Lisinopril 20 mg 1 tablet by mouth once daily   Triamterene-hydrochlorothiazide 37.5-25 mg 1 tablet by mouth once daily   Patient's blood pressures have improved back down to goal.   Counseling Points:   I have counseled this patient on appropriate blood pressure monitor techniques, provided a blood pressure monitor log, and have advised the patient to contact me with questions regarding their blood pressure.    Medications are  properly dosed for renal and hepatic function.          Type 2 diabetes mellitus without complication, without long-term current use of insulin (Multi)     Shwetha Madrigal has uncontrolled but improving type 2 diabetes mellitus complicated by hypertension, hyperlipidemia, and obesity as evidenced by her time in range over the past 28 days which is now at 40% which is below goal of >70% but has improved from 26% at last pharmacy encounter. Patient reports that she has decreased her carbohydrate intake and has been eating more protein and vegetables. She states she is still experimenting with different foods to see how they impact her blood sugar on the Codi which has led to some higher peaks still recently.   Patient believes she still has room to improve with her diet and can continue to cut out more carbohydrates. We discussed making realistic changes that she would be able to maintain long-term vs realizing when a drastic change may not be realistic.   CONTINUE:   Glyburide 5 mg 2 tablets by mouth twice daily   Metformin 1,000 mg 1 tablet by mouth twice daily   Patient will continue working on her diet changes for another month, as she states she sees Dr. Kenyon in a few weeks and will have an updated A1c completed. We discussed that at that time if her Codi report does not show significant improvement we will have to start additional medication therapy or increase her Metformin at that time.  We will follow-up in ~5 weeks in mid-May to go over her reports at that time.           Other Visit Diagnoses       Uncontrolled type 2 diabetes mellitus with hyperglycemia (Multi)    -  Primary    Relevant Orders    Follow Up In [facility] - Pharmacy        Pharmacy Follow-Up: 05/17/24   PCP Follow-Up: 05/03/24    Rohit HayesD     Continue all meds under the continuation of care with the referring provider and clinical pharmacy team.

## 2024-04-18 PROCEDURE — RXMED WILLOW AMBULATORY MEDICATION CHARGE

## 2024-04-19 ENCOUNTER — PHARMACY VISIT (OUTPATIENT)
Dept: PHARMACY | Facility: CLINIC | Age: 64
End: 2024-04-19
Payer: COMMERCIAL

## 2024-04-20 ENCOUNTER — LAB (OUTPATIENT)
Dept: LAB | Facility: LAB | Age: 64
End: 2024-04-20
Payer: COMMERCIAL

## 2024-04-20 DIAGNOSIS — E83.42 HYPOMAGNESEMIA: ICD-10-CM

## 2024-04-20 DIAGNOSIS — D32.9 MENINGIOMA (MULTI): ICD-10-CM

## 2024-04-20 DIAGNOSIS — E83.52 HYPERCALCEMIA: Primary | ICD-10-CM

## 2024-04-20 DIAGNOSIS — E10.9 TYPE 1 DIABETES MELLITUS WITHOUT COMPLICATION (MULTI): ICD-10-CM

## 2024-04-20 DIAGNOSIS — E21.3 HYPERPARATHYROIDISM (MULTI): ICD-10-CM

## 2024-04-20 DIAGNOSIS — E53.8 VITAMIN B12 DEFICIENCY: ICD-10-CM

## 2024-04-20 DIAGNOSIS — R79.89 LOW VITAMIN D LEVEL: ICD-10-CM

## 2024-04-20 DIAGNOSIS — Z00.00 ANNUAL PHYSICAL EXAM: ICD-10-CM

## 2024-04-20 LAB
25(OH)D3 SERPL-MCNC: 59 NG/ML (ref 30–100)
ALBUMIN SERPL BCP-MCNC: 4.2 G/DL (ref 3.4–5)
ALP SERPL-CCNC: 61 U/L (ref 33–136)
ALT SERPL W P-5'-P-CCNC: 17 U/L (ref 7–45)
ANION GAP SERPL CALC-SCNC: 16 MMOL/L (ref 10–20)
AST SERPL W P-5'-P-CCNC: 12 U/L (ref 9–39)
BILIRUB SERPL-MCNC: 0.6 MG/DL (ref 0–1.2)
BUN SERPL-MCNC: 20 MG/DL (ref 6–23)
CALCIUM SERPL-MCNC: 11 MG/DL (ref 8.6–10.6)
CHLORIDE SERPL-SCNC: 101 MMOL/L (ref 98–107)
CHOLEST SERPL-MCNC: 235 MG/DL (ref 0–199)
CHOLESTEROL/HDL RATIO: 6.1
CO2 SERPL-SCNC: 28 MMOL/L (ref 21–32)
CREAT SERPL-MCNC: 0.91 MG/DL (ref 0.5–1.05)
EGFRCR SERPLBLD CKD-EPI 2021: 70 ML/MIN/1.73M*2
ERYTHROCYTE [DISTWIDTH] IN BLOOD BY AUTOMATED COUNT: 13.5 % (ref 11.5–14.5)
EST. AVERAGE GLUCOSE BLD GHB EST-MCNC: 180 MG/DL
GLUCOSE SERPL-MCNC: 105 MG/DL (ref 74–99)
HBA1C MFR BLD: 7.9 %
HCT VFR BLD AUTO: 51.8 % (ref 36–46)
HDLC SERPL-MCNC: 38.7 MG/DL
HGB BLD-MCNC: 16.7 G/DL (ref 12–16)
LDLC SERPL CALC-MCNC: 127 MG/DL
MAGNESIUM SERPL-MCNC: 1.7 MG/DL (ref 1.6–2.4)
MCH RBC QN AUTO: 29.5 PG (ref 26–34)
MCHC RBC AUTO-ENTMCNC: 32.2 G/DL (ref 32–36)
MCV RBC AUTO: 91 FL (ref 80–100)
NON HDL CHOLESTEROL: 196 MG/DL (ref 0–149)
NRBC BLD-RTO: 0 /100 WBCS (ref 0–0)
PLATELET # BLD AUTO: 241 X10*3/UL (ref 150–450)
POTASSIUM SERPL-SCNC: 4.6 MMOL/L (ref 3.5–5.3)
PROT SERPL-MCNC: 6.5 G/DL (ref 6.4–8.2)
RBC # BLD AUTO: 5.67 X10*6/UL (ref 4–5.2)
SODIUM SERPL-SCNC: 140 MMOL/L (ref 136–145)
TRIGL SERPL-MCNC: 347 MG/DL (ref 0–149)
VIT B12 SERPL-MCNC: 414 PG/ML (ref 211–911)
VLDL: 69 MG/DL (ref 0–40)
WBC # BLD AUTO: 8.8 X10*3/UL (ref 4.4–11.3)

## 2024-04-20 PROCEDURE — 80061 LIPID PANEL: CPT

## 2024-04-20 PROCEDURE — 83036 HEMOGLOBIN GLYCOSYLATED A1C: CPT

## 2024-04-20 PROCEDURE — 82607 VITAMIN B-12: CPT

## 2024-04-20 PROCEDURE — 85027 COMPLETE CBC AUTOMATED: CPT

## 2024-04-20 PROCEDURE — 82570 ASSAY OF URINE CREATININE: CPT

## 2024-04-20 PROCEDURE — 82043 UR ALBUMIN QUANTITATIVE: CPT

## 2024-04-20 PROCEDURE — 36415 COLL VENOUS BLD VENIPUNCTURE: CPT

## 2024-04-20 PROCEDURE — 82306 VITAMIN D 25 HYDROXY: CPT

## 2024-04-20 PROCEDURE — 80053 COMPREHEN METABOLIC PANEL: CPT

## 2024-04-20 PROCEDURE — 84443 ASSAY THYROID STIM HORMONE: CPT

## 2024-04-20 PROCEDURE — 83735 ASSAY OF MAGNESIUM: CPT

## 2024-04-21 LAB
CREAT UR-MCNC: 54 MG/DL (ref 20–320)
MICROALBUMIN UR-MCNC: <7 MG/L
MICROALBUMIN/CREAT UR: NORMAL MG/G{CREAT}
TSH SERPL-ACNC: 1.81 MIU/L (ref 0.44–3.98)

## 2024-04-21 NOTE — RESULT ENCOUNTER NOTE
Blood test show elevated hemoglobin A1c at 7.9, diabetes not well-controlled, your calcium is elevated, LDL cholesterol is also elevated, please see your endocrinologist for follow-up,we  need to discuss starting statin we will do that at upcoming appointment.

## 2024-05-03 ENCOUNTER — APPOINTMENT (OUTPATIENT)
Dept: PRIMARY CARE | Facility: CLINIC | Age: 64
End: 2024-05-03
Payer: COMMERCIAL

## 2024-05-13 DIAGNOSIS — E11.9 TYPE 2 DIABETES MELLITUS WITHOUT COMPLICATION, WITHOUT LONG-TERM CURRENT USE OF INSULIN (MULTI): ICD-10-CM

## 2024-05-13 RX ORDER — GLYBURIDE 5 MG/1
10 TABLET ORAL 2 TIMES DAILY
Qty: 360 TABLET | Refills: 2 | Status: SHIPPED | OUTPATIENT
Start: 2024-05-13

## 2024-05-17 ENCOUNTER — APPOINTMENT (OUTPATIENT)
Dept: PHARMACY | Facility: HOSPITAL | Age: 64
End: 2024-05-17
Payer: COMMERCIAL

## 2024-05-17 DIAGNOSIS — E03.9 ACQUIRED HYPOTHYROIDISM: ICD-10-CM

## 2024-05-17 RX ORDER — LEVOTHYROXINE SODIUM 50 UG/1
50 TABLET ORAL DAILY
Qty: 90 TABLET | Refills: 3 | Status: SHIPPED | OUTPATIENT
Start: 2024-05-17

## 2024-05-20 DIAGNOSIS — I10 PRIMARY HYPERTENSION: ICD-10-CM

## 2024-05-20 RX ORDER — TRIAMTERENE/HYDROCHLOROTHIAZID 37.5-25 MG
1 TABLET ORAL DAILY
Qty: 90 TABLET | Refills: 3 | Status: SHIPPED | OUTPATIENT
Start: 2024-05-20

## 2024-05-30 ASSESSMENT — ENCOUNTER SYMPTOMS
DIABETIC ASSOCIATED SYMPTOMS: 0
HEADACHES: 0
NECK PAIN: 0
HYPERTENSION: 1

## 2024-05-31 ENCOUNTER — TELEMEDICINE (OUTPATIENT)
Dept: PHARMACY | Facility: HOSPITAL | Age: 64
End: 2024-05-31
Payer: COMMERCIAL

## 2024-05-31 DIAGNOSIS — E11.9 TYPE 2 DIABETES MELLITUS WITHOUT COMPLICATION, WITHOUT LONG-TERM CURRENT USE OF INSULIN (MULTI): ICD-10-CM

## 2024-05-31 DIAGNOSIS — E11.65 UNCONTROLLED TYPE 2 DIABETES MELLITUS WITH HYPERGLYCEMIA (MULTI): Primary | ICD-10-CM

## 2024-05-31 PROCEDURE — RXMED WILLOW AMBULATORY MEDICATION CHARGE

## 2024-05-31 ASSESSMENT — ENCOUNTER SYMPTOMS
VISUAL CHANGE: 0
POLYDIPSIA: 0

## 2024-05-31 NOTE — PROGRESS NOTES
Patient is sent at the request of Whit Kenyon MD for my opinion regarding Type 2 diabetes and hypertension.  My final recommendations will be communicated back to the requesting provider by way of shared medical record.    Subjective     Diabetes  She presents for her follow-up diabetic visit. She has type 2 diabetes mellitus. The initial diagnosis of diabetes was made 16 years ago. Her disease course has been improving. There are no hypoglycemic associated symptoms. Pertinent negatives for hypoglycemia include no headaches. There are no diabetic associated symptoms. Pertinent negatives for diabetes include no chest pain, no polydipsia, no polyuria and no visual change. (Notices more symptoms when she eats higher carbohydrates) There are no hypoglycemic complications. Symptoms are stable. There are no diabetic complications. Risk factors for coronary artery disease include diabetes mellitus, dyslipidemia, hypertension and obesity. Current diabetic treatment includes oral agent (dual therapy) (Metformin and Glyburide). She is compliant with treatment all of the time. Her weight is decreasing steadily. She is following a generally healthy diet. She rarely participates in exercise. Her overall blood glucose range is 140-180 mg/dl. An ACE inhibitor/angiotensin II receptor blocker is being taken. She does not see a podiatrist.Eye exam is current.   Hypertension  This is a chronic problem. The current episode started more than 1 year ago. The problem has been gradually improving since onset. The problem is uncontrolled. Pertinent negatives include no chest pain, headaches or neck pain. Agents associated with hypertension include estrogens, thyroid hormones and NSAIDs. Risk factors for coronary artery disease include diabetes mellitus, dyslipidemia and obesity. Past treatments include calcium channel blockers. The current treatment provides mild improvement. There are no compliance problems.  Identifiable causes of  hypertension include a hypertension causing med and a thyroid problem.     When patient started with the Codi she was on a very strict diet with protein and vegetables which led to her A1c decreasing down to 6.7% in December 2023.     Since then her A1c had increased back up to 7.9% on 04/20/24 which patient reports was due to her experimenting with foods to see how they affect her blood sugar and to learn what she could and could not eat. At last pharmacy encounter her blood pressures had improved to averaging at goal, and while her time in range had improved it was still less than goal of >70%. Today we are following-up to see if her blood sugars have improved. If her time in range is still below 70% we will discuss medication options to help lower her blood sugars today.     She does report she has had a sinus infection over the past couple weeks and has noted her blood sugars have been more elevated during that time.     Current diet:   Trying to eat plant based with no processed foods   Breakfast: takes some pretzels with her medications in the morning, coffee with half and half creamer, low sugar instant oatmeal at work, will also usually have a banana   Wants to try to eat an egg in the morning with her medications instead of pretzels  Lunch: tries to stick to salads with some protein, tries not to use bottled dressing, packs her lunch   Dinner: trying to eat salads as much as she can, will sometimes have sausage with them, kielbasa and sour kraut with mashed potatoes  Snacks: does snack on pretzels in between meals, avoids sweets, sometimes crackers with spread-able cheese  Fluids: water, hot tea  Still has not been eating fast food or out at restaurants  Since last encounter has been eating less carbs and watching what she eats   Has been eating more fresh fruits which can increase blood sugar     Current exercise:   Has 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 which limits her ability to exercise and walk throughout  "the day     Sodium intake:   Sometimes makes soups with canned black beans and green beans and other vegetables  Snacks on pretzels and crackers  Does not add salt to meals, will sometimes add salt to meals when cooking     Allergies   Allergen Reactions    Doxycycline Hives    Sulfamethoxazole-Trimethoprim Hives    Cephalexin Itching and Rash     Pruritus    Penicillins Headache, Unknown and Rash     rash    headache    rash   headache     Hypertension:    Blood Pressure Monitor Device: Omron purchased from our [facility] ~6 months ago    Affordability/Accessibility: No issues   Adherence/Organization: Uses a 2-week pill box   Have you missed any doses of your antihypertensives? No  Adverse Effects: Reports no issues     SMBP Measurements:   Averaging 120s/70s still   Patient did not have log with her today   Blood pressures have continued to remain stable     Has the patient experienced any low blood pressures since last contact? No  denies symptoms of low blood pressure: lightheadedness, dizziness, falls, blurry vision, clammy/pale skin     Has the patient experienced any high blood pressures since last contact? No  denies symptoms of high blood pressure: headache, chest pain, vision problems    Diabetes  The patient does have a known family history of diabetes (mother)     Patient is using: continuous glucose monitor (Codi 3) to self monitor blood sugar at home   Full 4-week CGM report attached to encounter today   Since last pharmacy encounter patient's time in range has worsened from 40% over the 28 days prior to last appointment to 35% over the past 28 days         Hypoglycemia frequency: 0%  Hypoglycemia awareness: Yes     Objective     Wt Readings from Last 3 Encounters:   02/19/24 98.6 kg (217 lb 6.4 oz)   11/01/23 97.4 kg (214 lb 12.8 oz)   10/04/23 103 kg (226 lb 3.2 oz)     Estimated body mass index is 36.74 kg/m² as calculated from the following:    Height as of 9/1/23: 1.638 m (5' 4.5\").    " Weight as of 2/19/24: 98.6 kg (217 lb 6.4 oz).    Diabetes Pharmacotherapy:  Glyburide 5 mg 2 tablets twice daily   Metformin 1,000 mg twice daily     Historical Diabetes Pharmacotherapy:   Januvia 25 mg     Hypertension Pharmacotherapy:  Atenolol 25 mg 1 tablet by mouth twice daily   Lisinopril 20 mg once daily   Triamterene-hydrochlorothiazide 37.5-25 mg once daily     Historical Hypertension Pharmacotherapy:   Amlodipine (did not tolerate due to edema)    SECONDARY PREVENTION  Statin? No  ACE-I/ARB? No  Aspirin? No    Pertinent PMH Review:  PMH of Pancreatitis: No  PMH of Retinopathy: No  PMH of Urinary Tract Infections: Yes, does not get frequently   PMH of MTC: No    Medication Reconciliation  No changes were made to patient's medication list during encounter today     Current Outpatient Medications on File Prior to Visit   Medication Sig Dispense Refill    alcohol swabs pads, medicated Use as needed. 100 each 1    atenolol (Tenormin) 25 mg tablet Take 1 tablet (25 mg) by mouth 2 times a day. 180 tablet 3    blood sugar diagnostic (Contour Next Test Strips) strip twice a day.      blood sugar diagnostic (OneTouch Verio test strips) strip TEST BLOOD GLUCOSE TWICE DAILY. 200 strip 3    blood-glucose sensor (FreeStyle Codi 3 Sensor) device Apply 1 sensor to the back of the arm for continuous glucose monitoring. Remove and apply new sensor every 14 days 6 each 3    clobetasoL-emollient 0.05 % cream APPY AND GENTLY MASSAGE INTO AFFECTED AREA(S) TWICE DAILY AS NEEDED FOR ITCHING 60 g 3    cyanocobalamin (Vitamin B-12) 1,000 mcg/mL injection Inject 1 mL (1,000 mcg) into the muscle every 30 (thirty) days. 12 mL 0    ergocalciferol (Vitamin D-2) 1.25 MG (64326 UT) capsule Take 1 capsule (1,250 mcg) by mouth 1 (one) time per week. 4 capsule 3    estradiol (Estrace) 0.1 MG/GM vaginal cream APPLY 0.5 GRAMS VAGINALLY WEEKLY AS DIRECTED 42.5 g 3    fluticasone (Flonase) 50 mcg/actuation nasal spray Administer 2 sprays into  "affected nostril(s) once daily as needed.      glyBURIDE (Diabeta) 5 mg tablet Take 2 tablets (10 mg) by mouth 2 times a day. 360 tablet 2    lancets (OneTouch Delica Plus Lancet) 33 gauge misc USE TO TEST TWICE DAILY. 200 each 3    levothyroxine (Synthroid, Levoxyl) 50 mcg tablet Take 1 tablet (50 mcg) by mouth once daily. 90 tablet 3    lisinopril 20 mg tablet Take 1 tablet (20 mg) by mouth once daily. 90 tablet 3    magnesium oxide (Mag-Ox) 250 mg magnesium tablet Take 1 tablet (250 mg) by mouth 2 times a day.      meclizine (Antivert) 12.5 mg tablet TAKE 1 TO 2 TABLETS BY MOUTH EVERY 4 TO 6 HOURS AS NEEDED FOR DIZZINESS. CAN CAUSE DROWSINESS      metFORMIN (Glucophage) 500 mg tablet Take 2 tablets (1,000 mg) by mouth 2 times a day. 360 tablet 3    nystatin-triamcinolone (Mycolog II) cream Apply 1 Application topically 3 times a day as needed. 60 g 1    OneTouch Verio Meter misc test as directed      Prolensa 0.07 % drops 1 drop. To the affected eye once daily for 4 weeks      syringe with needle (BD Luer-Adrian Syringe) 3 mL 25 gauge x 1\" syringe USE FOR VITAMIN B12 INJECTIONS ONCE A MONTH 4 each 3    triamterene-hydrochlorothiazid (Maxzide-25) 37.5-25 mg tablet Take 1 tablet by mouth once daily. Take with food. 90 tablet 3     No current facility-administered medications on file prior to visit.      Lab Review  Lab Results   Component Value Date    BILITOT 0.6 04/20/2024    CALCIUM 11.0 (H) 04/20/2024    CO2 28 04/20/2024     04/20/2024    CREATININE 0.91 04/20/2024    GLUCOSE 105 (H) 04/20/2024    ALKPHOS 61 04/20/2024    K 4.6 04/20/2024    PROT 6.5 04/20/2024     04/20/2024    AST 12 04/20/2024    ALT 17 04/20/2024    BUN 20 04/20/2024    ANIONGAP 16 04/20/2024    MG 1.70 04/20/2024    PHOS 3.0 01/30/2021    ALBUMIN 4.2 04/20/2024    AMYLASE 86 12/02/2020    LIPASE 122 (H) 12/02/2020    GFRF 64 08/24/2023    GFRMALE CANCELED 08/24/2023     Lab Results   Component Value Date    TRIG 347 (H) " 04/20/2024    CHOL 235 (H) 04/20/2024    LDLCALC 127 (H) 04/20/2024    HDL 38.7 04/20/2024     Lab Results   Component Value Date    HGBA1C 7.9 (H) 04/20/2024    HGBA1C 6.7 (H) 12/02/2023    HGBA1C 9.7 (A) 09/01/2023     The 10-year ASCVD risk score (Duane ZHOU, et al., 2019) is: 19.9%    Values used to calculate the score:      Age: 64 years      Sex: Female      Is Non- : No      Diabetic: Yes      Tobacco smoker: No      Systolic Blood Pressure: 138 mmHg      Is BP treated: Yes      HDL Cholesterol: 38.7 mg/dL      Total Cholesterol: 235 mg/dL    Health Maintenance:   Foot Exam: None  Eye Exam: ~8 months ago   Lipid Panel:  mg/dL,  mg/dL 04/20/24  Urine Albumin: Unable to calculate due to albumin <7 04/20/24  Influenza Vaccine: 10/19/2023  Pneumonia Vaccine: PPSV23 07/09/13    Drug Interactions:  Additive CNS Depression: lorazepam, tramadol, meclizine   Patient only uses Lorazepam for flying and will only use meclizine when needed for vertigo (states she rarely uses it)    Assessment/Plan   Problem List Items Addressed This Visit       Type 2 diabetes mellitus without complication, without long-term current use of insulin (Multi)     Shwetha Madrigal has uncontrolled but improving type 2 diabetes mellitus complicated by hypertension, hyperlipidemia, and obesity as evidenced by her time in range over the past 28 days which is now at 35% which has reduced from 40% at last pharmacy encounter and is well below goal of >70%.  Her blood pressures have continued to be controlled as she reports readings consistently in the 120s/70s.   At this time patient needs additional medication therapy to help lower her blood sugar. She is not interested in injectable medications and noted concerns with SGLT2 inhibitors due to increased risk of UTIs. She had previously been on Januvia 25 mg previously and reported that she tolerated it well and would be agreeable to starting back on Januvia  again. Based on her current renal function she is not indicated for a renal dose adjustment so she will be started on the full dose.   START: Januvia 100 mg 1 tablet by mouth once daily  CONTINUE:   Glyburide 5 mg 2 tablets by mouth twice daily   Metformin 1,000 mg 1 tablet by mouth twice daily   Patient is due for an updated A1c anytime after 07/20/24. An order was placed for patient to have this done prior to our next pharmacy appointment.   She will continue to work on diet and lifestyle changes over the next couple months as well to help with her blood sugars.   We will follow-up in ~2 months to go over her A1c results and blood sugars since re-starting Januvia. She was encouraged to reach out with any questions and/or concerns prior to our next follow-up.           Other Visit Diagnoses       Uncontrolled type 2 diabetes mellitus with hyperglycemia (Multi)    -  Primary    Relevant Medications    SITagliptin phosphate (Januvia) 100 mg tablet    Other Relevant Orders    Follow Up In [facility] Pharm[facility]    Hemoglobin A1c          Pharmacy Follow-Up: 08/16/2024   PCP Follow-Up: None currently scheduled, patient cancelled last follow-up due to being out of town and will reach out to office today to re-schedule    Jorge Miller, PharmD     Continue all meds under the continuation of care with the referring provider and clinical pharmacy team.

## 2024-05-31 NOTE — ASSESSMENT & PLAN NOTE
Shwetha Madrigal has uncontrolled but improving type 2 diabetes mellitus complicated by hypertension, hyperlipidemia, and obesity as evidenced by her time in range over the past 28 days which is now at 35% which has reduced from 40% at last pharmacy encounter and is well below goal of >70%.  Her blood pressures have continued to be controlled as she reports readings consistently in the 120s/70s.   At this time patient needs additional medication therapy to help lower her blood sugar. She is not interested in injectable medications and noted concerns with SGLT2 inhibitors due to increased risk of UTIs. She had previously been on Januvia 25 mg previously and reported that she tolerated it well and would be agreeable to starting back on Januvia again. Based on her current renal function she is not indicated for a renal dose adjustment so she will be started on the full dose.   START: Januvia 100 mg 1 tablet by mouth once daily  CONTINUE:   Glyburide 5 mg 2 tablets by mouth twice daily   Metformin 1,000 mg 1 tablet by mouth twice daily   Patient is due for an updated A1c anytime after 07/20/24. An order was placed for patient to have this done prior to our next pharmacy appointment.   She will continue to work on diet and lifestyle changes over the next couple months as well to help with her blood sugars.   We will follow-up in ~2 months to go over her A1c results and blood sugars since re-starting Januvia. She was encouraged to reach out with any questions and/or concerns prior to our next follow-up.

## 2024-06-01 ENCOUNTER — PHARMACY VISIT (OUTPATIENT)
Dept: PHARMACY | Facility: CLINIC | Age: 64
End: 2024-06-01
Payer: COMMERCIAL

## 2024-07-17 ENCOUNTER — APPOINTMENT (OUTPATIENT)
Dept: PRIMARY CARE | Facility: CLINIC | Age: 64
End: 2024-07-17
Payer: COMMERCIAL

## 2024-07-17 ENCOUNTER — PHARMACY VISIT (OUTPATIENT)
Dept: PHARMACY | Facility: CLINIC | Age: 64
End: 2024-07-17
Payer: COMMERCIAL

## 2024-07-17 VITALS
OXYGEN SATURATION: 96 % | HEART RATE: 75 BPM | BODY MASS INDEX: 37.11 KG/M2 | TEMPERATURE: 97.3 F | DIASTOLIC BLOOD PRESSURE: 76 MMHG | WEIGHT: 219.6 LBS | SYSTOLIC BLOOD PRESSURE: 127 MMHG

## 2024-07-17 DIAGNOSIS — R42 DIZZINESS: ICD-10-CM

## 2024-07-17 DIAGNOSIS — I10 BENIGN ESSENTIAL HYPERTENSION: ICD-10-CM

## 2024-07-17 DIAGNOSIS — E78.49 OTHER HYPERLIPIDEMIA: ICD-10-CM

## 2024-07-17 DIAGNOSIS — E83.52 HYPERCALCEMIA: ICD-10-CM

## 2024-07-17 DIAGNOSIS — E11.9 TYPE 2 DIABETES MELLITUS WITHOUT COMPLICATION, WITHOUT LONG-TERM CURRENT USE OF INSULIN (MULTI): Primary | ICD-10-CM

## 2024-07-17 DIAGNOSIS — K21.9 GASTROESOPHAGEAL REFLUX DISEASE WITHOUT ESOPHAGITIS: ICD-10-CM

## 2024-07-17 DIAGNOSIS — D58.2 ELEVATED HEMOGLOBIN (CMS-HCC): ICD-10-CM

## 2024-07-17 DIAGNOSIS — J30.9 ALLERGIC RHINITIS, UNSPECIFIED SEASONALITY, UNSPECIFIED TRIGGER: ICD-10-CM

## 2024-07-17 DIAGNOSIS — E21.3 HYPERPARATHYROIDISM (MULTI): ICD-10-CM

## 2024-07-17 DIAGNOSIS — E03.9 ACQUIRED HYPOTHYROIDISM: ICD-10-CM

## 2024-07-17 DIAGNOSIS — Z12.31 VISIT FOR SCREENING MAMMOGRAM: ICD-10-CM

## 2024-07-17 PROCEDURE — 3078F DIAST BP <80 MM HG: CPT | Performed by: INTERNAL MEDICINE

## 2024-07-17 PROCEDURE — 3049F LDL-C 100-129 MG/DL: CPT | Performed by: INTERNAL MEDICINE

## 2024-07-17 PROCEDURE — RXMED WILLOW AMBULATORY MEDICATION CHARGE

## 2024-07-17 PROCEDURE — 1159F MED LIST DOCD IN RCRD: CPT | Performed by: INTERNAL MEDICINE

## 2024-07-17 PROCEDURE — 3074F SYST BP LT 130 MM HG: CPT | Performed by: INTERNAL MEDICINE

## 2024-07-17 PROCEDURE — 1160F RVW MEDS BY RX/DR IN RCRD: CPT | Performed by: INTERNAL MEDICINE

## 2024-07-17 PROCEDURE — 3062F POS MACROALBUMINURIA REV: CPT | Performed by: INTERNAL MEDICINE

## 2024-07-17 PROCEDURE — 3051F HG A1C>EQUAL 7.0%<8.0%: CPT | Performed by: INTERNAL MEDICINE

## 2024-07-17 PROCEDURE — 99214 OFFICE O/P EST MOD 30 MIN: CPT | Performed by: INTERNAL MEDICINE

## 2024-07-17 PROCEDURE — 1036F TOBACCO NON-USER: CPT | Performed by: INTERNAL MEDICINE

## 2024-07-17 PROCEDURE — 4010F ACE/ARB THERAPY RXD/TAKEN: CPT | Performed by: INTERNAL MEDICINE

## 2024-07-17 RX ORDER — LISINOPRIL 20 MG/1
20 TABLET ORAL DAILY
Qty: 90 TABLET | Refills: 3 | Status: SHIPPED | OUTPATIENT
Start: 2024-07-17

## 2024-07-17 RX ORDER — MECLIZINE HCL 12.5 MG 12.5 MG/1
TABLET ORAL
Qty: 30 TABLET | Refills: 3 | Status: SHIPPED | OUTPATIENT
Start: 2024-07-17

## 2024-07-17 RX ORDER — ISOPROPYL ALCOHOL 70 ML/100ML
SWAB TOPICAL
Qty: 100 EACH | Refills: 3 | Status: SHIPPED | OUTPATIENT
Start: 2024-07-17

## 2024-07-17 RX ORDER — FLUTICASONE PROPIONATE 50 MCG
2 SPRAY, SUSPENSION (ML) NASAL DAILY PRN
Qty: 48 G | Refills: 3 | Status: SHIPPED | OUTPATIENT
Start: 2024-07-17

## 2024-07-17 ASSESSMENT — PATIENT HEALTH QUESTIONNAIRE - PHQ9
SUM OF ALL RESPONSES TO PHQ9 QUESTIONS 1 AND 2: 0
1. LITTLE INTEREST OR PLEASURE IN DOING THINGS: NOT AT ALL
2. FEELING DOWN, DEPRESSED OR HOPELESS: NOT AT ALL

## 2024-07-17 NOTE — PROGRESS NOTES
Subjective   Patient ID: Shwetha Madrigal is a 64 y.o. female who presents for Follow-up (Patient is here for a follow up on medication refills. ).    Here for follow up,she sees Jorge,pharmacist,for her DM,not interested in injectable.  She has HTN,DM-2,HLD,hypothyroid,fatty liver.  She still has her vertigo since her covid 2 years ago.she has a desk job.she always uses her cane for ambulation.  She saw several specialist.  She sees ophthalmologist for eye exam.         Review of Systems   Respiratory: Negative.     Cardiovascular: Negative.    Gastrointestinal: Negative.    Neurological:  Positive for dizziness.       Objective   /76 (BP Location: Left arm, Patient Position: Sitting, BP Cuff Size: Large adult)   Pulse 75   Temp 36.3 °C (97.3 °F) (Temporal)   Wt 99.6 kg (219 lb 9.6 oz)   SpO2 96%   BMI 37.11 kg/m²     Physical Exam  Constitutional:       Appearance: Normal appearance.   HENT:      Head: Normocephalic and atraumatic.   Eyes:      Extraocular Movements: Extraocular movements intact.      Pupils: Pupils are equal, round, and reactive to light.   Cardiovascular:      Rate and Rhythm: Normal rate and regular rhythm.      Heart sounds: Normal heart sounds.   Pulmonary:      Effort: Pulmonary effort is normal.      Breath sounds: Normal breath sounds. No wheezing or rhonchi.   Abdominal:      General: Abdomen is flat. Bowel sounds are normal. There is no distension.      Palpations: Abdomen is soft.   Musculoskeletal:      Cervical back: Normal range of motion and neck supple.      Right lower leg: No edema.      Left lower leg: No edema.      Comments: Using her cane for ambulation.   Skin:     General: Skin is warm.   Neurological:      General: No focal deficit present.      Mental Status: She is alert and oriented to person, place, and time.   Psychiatric:         Mood and Affect: Mood normal.         Behavior: Behavior normal.         Assessment/Plan   Problem List Items Addressed This  Visit             ICD-10-CM    Type 2 diabetes mellitus without complication, without long-term current use of insulin (Multi) - Primary E11.9    Relevant Medications    alcohol swabs pads, medicated    Other Relevant Orders    Comprehensive Metabolic Panel    Other hyperlipidemia E78.49    Acquired hypothyroidism E03.9     Stable on Levothyroxine.         Allergic rhinitis J30.9    Relevant Medications    fluticasone (Flonase) 50 mcg/actuation nasal spray    Dizziness R42    Relevant Medications    meclizine (Antivert) 12.5 mg tablet    Other Relevant Orders    Disability Placard    Elevated hemoglobin (CMS-HCC) D58.2    Esophageal reflux K21.9    Hypercalcemia E83.52     Check CMP and PTH         Hyperparathyroidism (Multi) E21.3    Benign essential hypertension I10     Stable on current medication.         Relevant Medications    lisinopril 20 mg tablet     Other Visit Diagnoses         Codes    Visit for screening mammogram     Z12.31    Relevant Orders    BI mammo bilateral screening tomosynthesis

## 2024-07-18 PROBLEM — U07.1 COVID-19 VIRUS INFECTION: Status: RESOLVED | Noted: 2023-07-19 | Resolved: 2024-07-18

## 2024-07-18 ASSESSMENT — ENCOUNTER SYMPTOMS
DIZZINESS: 1
GASTROINTESTINAL NEGATIVE: 1
RESPIRATORY NEGATIVE: 1
CARDIOVASCULAR NEGATIVE: 1

## 2024-07-20 ENCOUNTER — HOSPITAL ENCOUNTER (OUTPATIENT)
Dept: RADIOLOGY | Facility: CLINIC | Age: 64
Discharge: HOME | End: 2024-07-20
Payer: COMMERCIAL

## 2024-07-20 VITALS — WEIGHT: 219.58 LBS | HEIGHT: 64 IN | BODY MASS INDEX: 37.49 KG/M2

## 2024-07-20 DIAGNOSIS — Z12.31 VISIT FOR SCREENING MAMMOGRAM: ICD-10-CM

## 2024-07-20 PROCEDURE — 77063 BREAST TOMOSYNTHESIS BI: CPT | Performed by: RADIOLOGY

## 2024-07-20 PROCEDURE — 77067 SCR MAMMO BI INCL CAD: CPT | Performed by: RADIOLOGY

## 2024-07-20 PROCEDURE — 77067 SCR MAMMO BI INCL CAD: CPT

## 2024-08-10 ENCOUNTER — LAB (OUTPATIENT)
Dept: LAB | Facility: LAB | Age: 64
End: 2024-08-10
Payer: COMMERCIAL

## 2024-08-10 DIAGNOSIS — E11.65 UNCONTROLLED TYPE 2 DIABETES MELLITUS WITH HYPERGLYCEMIA (MULTI): ICD-10-CM

## 2024-08-10 DIAGNOSIS — E11.9 TYPE 2 DIABETES MELLITUS WITHOUT COMPLICATION, WITHOUT LONG-TERM CURRENT USE OF INSULIN (MULTI): ICD-10-CM

## 2024-08-10 DIAGNOSIS — E83.52 HYPERCALCEMIA: ICD-10-CM

## 2024-08-10 LAB
ALBUMIN SERPL BCP-MCNC: 3.8 G/DL (ref 3.4–5)
ALP SERPL-CCNC: 60 U/L (ref 33–136)
ALT SERPL W P-5'-P-CCNC: 19 U/L (ref 7–45)
ANION GAP SERPL CALC-SCNC: 13 MMOL/L (ref 10–20)
AST SERPL W P-5'-P-CCNC: 15 U/L (ref 9–39)
BILIRUB SERPL-MCNC: 0.5 MG/DL (ref 0–1.2)
BUN SERPL-MCNC: 18 MG/DL (ref 6–23)
CALCIUM SERPL-MCNC: 10.9 MG/DL (ref 8.6–10.6)
CHLORIDE SERPL-SCNC: 102 MMOL/L (ref 98–107)
CO2 SERPL-SCNC: 29 MMOL/L (ref 21–32)
CREAT SERPL-MCNC: 0.79 MG/DL (ref 0.5–1.05)
EGFRCR SERPLBLD CKD-EPI 2021: 83 ML/MIN/1.73M*2
EST. AVERAGE GLUCOSE BLD GHB EST-MCNC: 171 MG/DL
GLUCOSE SERPL-MCNC: 152 MG/DL (ref 74–99)
HBA1C MFR BLD: 7.6 %
POTASSIUM SERPL-SCNC: 4.2 MMOL/L (ref 3.5–5.3)
PROT SERPL-MCNC: 6.3 G/DL (ref 6.4–8.2)
PTH-INTACT SERPL-MCNC: 96.4 PG/ML (ref 18.5–88)
SODIUM SERPL-SCNC: 140 MMOL/L (ref 136–145)

## 2024-08-10 PROCEDURE — 36415 COLL VENOUS BLD VENIPUNCTURE: CPT

## 2024-08-10 PROCEDURE — 83970 ASSAY OF PARATHORMONE: CPT

## 2024-08-10 PROCEDURE — 80053 COMPREHEN METABOLIC PANEL: CPT

## 2024-08-10 PROCEDURE — 83036 HEMOGLOBIN GLYCOSYLATED A1C: CPT

## 2024-08-15 ASSESSMENT — ENCOUNTER SYMPTOMS
VISUAL CHANGE: 0
DIABETIC ASSOCIATED SYMPTOMS: 0
POLYDIPSIA: 0

## 2024-08-15 NOTE — PROGRESS NOTES
Patient is sent at the request of Whit Kenyon MD for my opinion regarding Type 2 diabetes and hypertension.  My final recommendations will be communicated back to the requesting provider by way of shared medical record.    Subjective     Diabetes  She presents for her follow-up diabetic visit. She has type 2 diabetes mellitus. The initial diagnosis of diabetes was made 16 years ago. Her disease course has been improving. There are no hypoglycemic associated symptoms. There are no diabetic associated symptoms. Pertinent negatives for diabetes include no polydipsia, no polyuria and no visual change. (Notices more symptoms when she eats higher carbohydrates) There are no hypoglycemic complications. Symptoms are stable. There are no diabetic complications. Risk factors for coronary artery disease include diabetes mellitus, dyslipidemia, hypertension and obesity. Current diabetic treatment includes oral agent (dual therapy) (Metformin and Glyburide). She is compliant with treatment all of the time. Her weight is decreasing steadily. She is following a generally healthy diet. She rarely participates in exercise. Her overall blood glucose range is 140-180 mg/dl. An ACE inhibitor/angiotensin II receptor blocker is being taken. She does not see a podiatrist.Eye exam is current.     When patient started with the Codi she was on a very strict diet with protein and vegetables which led to her A1c decreasing down to 6.7% in December 2023.     Since then her A1c had increased back up to 7.9% on 04/20/24 which patient reports was due to her experimenting with foods to see how they affect her blood sugar and to learn what she could and could not eat. At last pharmacy encounter her blood pressures had improved to averaging at goal, and while her time in range had improved it was still less than goal of >70%.     At last pharmacy appointment in May she was started on Januvia 100 mg once daily and continued on glyburide and  metformin. Today she reports she has not yet started Januvia. She notes she had issues with her liver and wanted to make sure that her blood work was normal prior to starting the medication again. Based on it's mechanism and metabolism Januvia should not affect the liver and can be used in hepatic impairment.     Her updated A1c on 08/10/24 showed improvement in A1c to 7.6%. We are following-up today to discuss titration of her diabetic medications to help improve her A1c and to go over her most recent Codi report to assess her glycemic control.     Current diet:   Trying to eat plant based with no processed foods   Breakfast: takes some pretzels with her medications in the morning, coffee with half and half creamer, low sugar instant oatmeal at work, will also usually have a banana   Wants to try to eat an egg in the morning with her medications instead of pretzels  Lunch: tries to stick to salads with some protein, tries not to use bottled dressing, packs her lunch   Dinner: trying to eat salads as much as she can, will sometimes have sausage with them, kielbasa and sour kraut with mashed potatoes  Snacks: does snack on pretzels in between meals, avoids sweets, sometimes crackers with spread-able cheese  Fluids: water, hot tea  Still has not been eating fast food or out at restaurants  Since last encounter has been eating less carbs and watching what she eats   Has been eating more fresh fruits which can increase blood sugar     Current exercise:   Has 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 which limits her ability to exercise and walk throughout the day     Sodium intake:   Sometimes makes soups with canned black beans and green beans and other vegetables  Snacks on pretzels and crackers  Does not add salt to meals, will sometimes add salt to meals when cooking     Allergies   Allergen Reactions    Doxycycline Hives    Sulfamethoxazole-Trimethoprim Hives    Cephalexin Itching and Rash     Pruritus    Penicillins  "Headache, Unknown and Rash     rash    headache    rash   headache     Hypertension:    Blood Pressure Monitor Device: Omron purchased from our [facility] ~6 months ago    Affordability/Accessibility: No issues   Adherence/Organization: Uses a 2-week pill box   Have you missed any doses of your antihypertensives? No  Adverse Effects: Reports no issues     SMBP Measurements:   Averaging 120s/70s still   Patient did not have log with her today   Blood pressures have continued to remain stable     Has the patient experienced any low blood pressures since last contact? No  denies symptoms of low blood pressure: lightheadedness, dizziness, falls, blurry vision, clammy/pale skin     Has the patient experienced any high blood pressures since last contact? No  denies symptoms of high blood pressure: headache, chest pain, vision problems    Diabetes  The patient does have a known family history of diabetes (mother)     Patient is using: continuous glucose monitor (Codi 3) to self monitor blood sugar at home   Since last pharmacy encounter patient's time in range has improved from 35% to 52% over the past 4 weeks           Hypoglycemia frequency: 0%  Hypoglycemia awareness: Yes     Objective     Wt Readings from Last 3 Encounters:   07/20/24 99.6 kg (219 lb 9.3 oz)   07/17/24 99.6 kg (219 lb 9.6 oz)   02/19/24 98.6 kg (217 lb 6.4 oz)     Estimated body mass index is 37.12 kg/m² as calculated from the following:    Height as of 7/20/24: 1.638 m (5' 4.49\").    Weight as of 7/20/24: 99.6 kg (219 lb 9.3 oz).    Diabetes Pharmacotherapy:  Glyburide 5 mg 2 tablets twice daily   Metformin 1,000 mg twice daily   Januvia 100 mg once daily (per patient has not started yet)    Historical Diabetes Pharmacotherapy:   Januvia 25 mg     Hypertension Pharmacotherapy:  Atenolol 25 mg 1 tablet by mouth twice daily   Lisinopril 20 mg once daily   Triamterene-hydrochlorothiazide 37.5-25 mg once daily     Historical Hypertension " Pharmacotherapy:   Amlodipine (did not tolerate due to edema)    SECONDARY PREVENTION  Statin? No  ACE-I/ARB? No  Aspirin? No    Pertinent PMH Review:  PMH of Pancreatitis: No  PMH of Retinopathy: No  PMH of Urinary Tract Infections: Yes, does not get frequently   PMH of MTC: No    Medication Reconciliation  No changes were made to patient's medication list during encounter today     Current Outpatient Medications on File Prior to Visit   Medication Sig Dispense Refill    alcohol swabs pads, medicated Use as needed. 100 each 3    atenolol (Tenormin) 25 mg tablet Take 1 tablet (25 mg) by mouth 2 times a day. 180 tablet 3    blood sugar diagnostic (OneTouch Verio test strips) strip TEST BLOOD GLUCOSE TWICE DAILY. 200 strip 3    blood-glucose sensor (FreeStyle Codi 3 Sensor) device Apply 1 sensor to the back of the arm for continuous glucose monitoring. Remove and apply new sensor every 14 days 6 each 3    clobetasoL-emollient 0.05 % cream APPY AND GENTLY MASSAGE INTO AFFECTED AREA(S) TWICE DAILY AS NEEDED FOR ITCHING 60 g 3    cyanocobalamin (Vitamin B-12) 1,000 mcg/mL injection Inject 1 mL (1,000 mcg) into the muscle every 30 (thirty) days. 12 mL 0    ergocalciferol (Vitamin D-2) 1.25 MG (57383 UT) capsule Take 1 capsule (1,250 mcg) by mouth 1 (one) time per week. 4 capsule 3    estradiol (Estrace) 0.1 MG/GM vaginal cream APPLY 0.5 GRAMS VAGINALLY WEEKLY AS DIRECTED 42.5 g 3    fluticasone (Flonase) 50 mcg/actuation nasal spray Administer 2 sprays into each nostril once daily as needed for rhinitis or allergies. 48 g 3    glyBURIDE (Diabeta) 5 mg tablet Take 2 tablets (10 mg) by mouth 2 times a day. 360 tablet 2    lancets (OneTouch Delica Plus Lancet) 33 gauge misc USE TO TEST TWICE DAILY. 200 each 3    levothyroxine (Synthroid, Levoxyl) 50 mcg tablet Take 1 tablet (50 mcg) by mouth once daily. 90 tablet 3    lisinopril 20 mg tablet Take 1 tablet (20 mg) by mouth once daily. 90 tablet 3    meclizine (Antivert) 12.5  "mg tablet TAKE 1-2 TABLETS BY MOUTH EVERY 4-6 HOURS PRN FOR DIZZINESS. CAN CAUSE DROWSINESS. 30 tablet 3    metFORMIN (Glucophage) 500 mg tablet Take 2 tablets (1,000 mg) by mouth 2 times a day. 360 tablet 3    nystatin-triamcinolone (Mycolog II) cream Apply 1 Application topically 3 times a day as needed. 60 g 1    OneTouch Verio Meter misc test as directed      SITagliptin phosphate (Januvia) 100 mg tablet Take 1 tablet (100 mg) by mouth once daily. 90 tablet 1    syringe with needle (BD Luer-Adrian Syringe) 3 mL 25 gauge x 1\" syringe USE FOR VITAMIN B12 INJECTIONS ONCE A MONTH 4 each 3    triamterene-hydrochlorothiazid (Maxzide-25) 37.5-25 mg tablet Take 1 tablet by mouth once daily. Take with food. 90 tablet 3     No current facility-administered medications on file prior to visit.      Lab Review  Lab Results   Component Value Date    BILITOT 0.5 08/10/2024    CALCIUM 10.9 (H) 08/10/2024    CO2 29 08/10/2024     08/10/2024    CREATININE 0.79 08/10/2024    GLUCOSE 152 (H) 08/10/2024    ALKPHOS 60 08/10/2024    K 4.2 08/10/2024    PROT 6.3 (L) 08/10/2024     08/10/2024    AST 15 08/10/2024    ALT 19 08/10/2024    BUN 18 08/10/2024    ANIONGAP 13 08/10/2024    MG 1.70 04/20/2024    PHOS 3.0 01/30/2021    ALBUMIN 3.8 08/10/2024    AMYLASE 86 12/02/2020    LIPASE 122 (H) 12/02/2020    GFRF 64 08/24/2023    GFRMALE CANCELED 08/24/2023     Lab Results   Component Value Date    TRIG 347 (H) 04/20/2024    CHOL 235 (H) 04/20/2024    LDLCALC 127 (H) 04/20/2024    HDL 38.7 04/20/2024     Lab Results   Component Value Date    HGBA1C 7.6 (H) 08/10/2024    HGBA1C 7.9 (H) 04/20/2024    HGBA1C 6.7 (H) 12/02/2023     The 10-year ASCVD risk score (Duane ZHOU, et al., 2019) is: 17.1%    Values used to calculate the score:      Age: 64 years      Sex: Female      Is Non- : No      Diabetic: Yes      Tobacco smoker: No      Systolic Blood Pressure: 127 mmHg      Is BP treated: Yes      HDL " Cholesterol: 38.7 mg/dL      Total Cholesterol: 235 mg/dL    Health Maintenance:   Foot Exam: None  Eye Exam: ~8 months ago   Lipid Panel:  mg/dL,  mg/dL 04/20/24  Urine Albumin: Unable to calculate due to albumin <7 04/20/24  Influenza Vaccine: 10/19/2023  Pneumonia Vaccine: PPSV23 07/09/13    Drug Interactions:  Additive CNS Depression: lorazepam, tramadol, meclizine   Patient only uses Lorazepam for flying and will only use meclizine when needed for vertigo (states she rarely uses it)    Assessment/Plan   Problem List Items Addressed This Visit       Type 2 diabetes mellitus without complication, without long-term current use of insulin (Multi) - Primary     Patient's goal A1c is < 7%.  Is pt at goal? No, most recent A1c was 7.6% on 08/10/24 which had decreased slightly from 7.9% on 04/20/24.  Patient's time in range on her Codi has improved from 35% up to 52%     Rationale for plan:   Patient has not started Januvia yet since it was started back in May as she was waiting to have her liver function labs completed  Time in range did increase despite no change in therapy  Patient will start Januvia tomorrow  Patient is on the max dose of all 3 of her diabetic medications    Medication Changes:  CONTINUE:  Glyburide 5 mg 2 tablets by mouth twice daily  Metformin 1,000 mg 1 tablet by mouth twice daily   START  Januvia 100 mg 1 tablet by mouth once daily     Future Considerations:  If time in range is still <70% after 1 month of Januvia therapy options for additional therapy include SGLT2 inhibitors (Farxiga or Jardiance) or Rybelsus for oral therapy     Monitoring and Education:  We will review updated 4-week Codi report at next follow-up  Next A1c due in 3 months (anytime after 11/10/24)  Patient did ask if Januvia would impact her elevated parathyroidhormone, she was informed that Januvia would not impact this.     We will plan to follow-up in ~1 month to see if her time in range has improved at all  after starting Januvia. She was encouraged to reach out with any questions and/or concerns prior to our next follow-up.            Relevant Orders    Follow Up In [facility] - Pharmacy     Other Visit Diagnoses       Uncontrolled type 2 diabetes mellitus with hyperglycemia (Multi)              Pharmacy Follow-Up: 09/27/2024   PCP Follow-Up: 11/13/2024    Jorge Miller PharmD     Continue all meds under the continuation of care with the referring provider and clinical pharmacy team.

## 2024-08-16 ENCOUNTER — APPOINTMENT (OUTPATIENT)
Dept: PHARMACY | Facility: HOSPITAL | Age: 64
End: 2024-08-16
Payer: COMMERCIAL

## 2024-08-16 DIAGNOSIS — E11.65 UNCONTROLLED TYPE 2 DIABETES MELLITUS WITH HYPERGLYCEMIA (MULTI): ICD-10-CM

## 2024-08-16 DIAGNOSIS — E11.9 TYPE 2 DIABETES MELLITUS WITHOUT COMPLICATION, WITHOUT LONG-TERM CURRENT USE OF INSULIN (MULTI): Primary | ICD-10-CM

## 2024-08-16 NOTE — ASSESSMENT & PLAN NOTE
Patient's goal A1c is < 7%.  Is pt at goal? No, most recent A1c was 7.6% on 08/10/24 which had decreased slightly from 7.9% on 04/20/24.  Patient's time in range on her Codi has improved from 35% up to 52%     Rationale for plan:   Patient has not started Januvia yet since it was started back in May as she was waiting to have her liver function labs completed  Time in range did increase despite no change in therapy  Patient will start Januvia tomorrow  Patient is on the max dose of all 3 of her diabetic medications    Medication Changes:  CONTINUE:  Glyburide 5 mg 2 tablets by mouth twice daily  Metformin 1,000 mg 1 tablet by mouth twice daily   START  Januvia 100 mg 1 tablet by mouth once daily     Future Considerations:  If time in range is still <70% after 1 month of Januvia therapy options for additional therapy include SGLT2 inhibitors (Farxiga or Jardiance) or Rybelsus for oral therapy     Monitoring and Education:  We will review updated 4-week Codi report at next follow-up  Next A1c due in 3 months (anytime after 11/10/24)  Patient did ask if Januvia would impact her elevated parathyroidhormone, she was informed that Januvia would not impact this.     We will plan to follow-up in ~1 month to see if her time in range has improved at all after starting Januvia. She was encouraged to reach out with any questions and/or concerns prior to our next follow-up.

## 2024-08-21 DIAGNOSIS — R21 RASH: ICD-10-CM

## 2024-08-21 DIAGNOSIS — N95.1 POSTMENOPAUSAL DISORDER: ICD-10-CM

## 2024-08-21 RX ORDER — ESTRADIOL 0.1 MG/G
CREAM VAGINAL
Qty: 127.5 G | Refills: 3 | Status: SHIPPED | OUTPATIENT
Start: 2024-08-21

## 2024-08-21 RX ORDER — NYSTATIN AND TRIAMCINOLONE ACETONIDE 100000; 1 [USP'U]/G; MG/G
CREAM TOPICAL
Qty: 180 G | Refills: 3 | Status: SHIPPED | OUTPATIENT
Start: 2024-08-21

## 2024-08-21 RX ORDER — CLOBETASOL PROPIONATE 0.5 MG/G
EMULSION TOPICAL
Qty: 180 G | Refills: 3 | Status: SHIPPED | OUTPATIENT
Start: 2024-08-21

## 2024-08-26 DIAGNOSIS — E11.9 TYPE 2 DIABETES MELLITUS WITHOUT COMPLICATION, WITHOUT LONG-TERM CURRENT USE OF INSULIN (MULTI): ICD-10-CM

## 2024-08-26 DIAGNOSIS — N95.1 POSTMENOPAUSAL DISORDER: ICD-10-CM

## 2024-08-26 RX ORDER — ISOPROPYL ALCOHOL 70 ML/100ML
SWAB TOPICAL
Qty: 200 EACH | Refills: 3 | Status: SHIPPED | OUTPATIENT
Start: 2024-08-26

## 2024-08-26 RX ORDER — ESTRADIOL 0.1 MG/G
CREAM VAGINAL
Qty: 127.5 G | Refills: 3 | Status: SHIPPED | OUTPATIENT
Start: 2024-08-26

## 2024-08-27 DIAGNOSIS — I10 BENIGN ESSENTIAL HYPERTENSION: ICD-10-CM

## 2024-08-27 DIAGNOSIS — E11.9 TYPE 2 DIABETES MELLITUS WITHOUT COMPLICATION, WITHOUT LONG-TERM CURRENT USE OF INSULIN (MULTI): ICD-10-CM

## 2024-08-27 RX ORDER — LISINOPRIL 20 MG/1
20 TABLET ORAL DAILY
Qty: 90 TABLET | Refills: 3 | Status: SHIPPED | OUTPATIENT
Start: 2024-08-27

## 2024-08-27 RX ORDER — METFORMIN HYDROCHLORIDE 500 MG/1
1000 TABLET ORAL 2 TIMES DAILY
Qty: 360 TABLET | Refills: 3 | Status: SHIPPED | OUTPATIENT
Start: 2024-08-27

## 2024-09-12 PROCEDURE — RXMED WILLOW AMBULATORY MEDICATION CHARGE

## 2024-09-16 ENCOUNTER — PHARMACY VISIT (OUTPATIENT)
Dept: PHARMACY | Facility: CLINIC | Age: 64
End: 2024-09-16
Payer: COMMERCIAL

## 2024-09-27 ENCOUNTER — APPOINTMENT (OUTPATIENT)
Dept: PHARMACY | Facility: HOSPITAL | Age: 64
End: 2024-09-27
Payer: COMMERCIAL

## 2024-10-24 ASSESSMENT — ENCOUNTER SYMPTOMS
VISUAL CHANGE: 0
DIABETIC ASSOCIATED SYMPTOMS: 0
POLYDIPSIA: 0

## 2024-10-24 NOTE — PROGRESS NOTES
Patient is sent at the request of Whit Kenyon MD for my opinion regarding Type 2 diabetes and hypertension.  My final recommendations will be communicated back to the requesting provider by way of shared medical record.    Subjective     Diabetes  She presents for her follow-up diabetic visit. She has type 2 diabetes mellitus. The initial diagnosis of diabetes was made 16 years ago. Her disease course has been improving. There are no hypoglycemic associated symptoms. There are no diabetic associated symptoms. Pertinent negatives for diabetes include no polydipsia, no polyuria and no visual change. (Notices more symptoms when she eats higher carbohydrates) There are no hypoglycemic complications. Symptoms are stable. There are no diabetic complications. Risk factors for coronary artery disease include diabetes mellitus, dyslipidemia, hypertension and obesity. Current diabetic treatment includes oral agent (triple therapy). She is compliant with treatment all of the time. Her weight is increasing steadily. She is following a generally healthy diet. She rarely participates in exercise. Her overall blood glucose range is >200 mg/dl. An ACE inhibitor/angiotensin II receptor blocker is being taken. She does not see a podiatrist.Eye exam is current.     When patient started with the Codi she was on a very strict diet with protein and vegetables which led to her A1c decreasing down to 6.7% in December 2023.     Since then her A1c had increased back up to 7.9% on 04/20/24 which patient reports was due to her experimenting with foods to see how they affect her blood sugar and to learn what she could and could not eat.     At last pharmacy encounter she had not started Januvia as instructed to appointment prior to then. She was instructed to start Januvia at the end of our last appointment. We are following-up today to see how she has tolerated the medication and to see how her blood sugars have reacted to the  medication.     Today states she has been taking Januvia daily - has not seen improvement in blood sugars.     Current diet:   Trying to eat plant based with no processed foods   Breakfast: takes some pretzels with her medications in the morning, coffee with half and half creamer, low sugar instant oatmeal at work, will also usually have a banana   Wants to try to eat an egg in the morning with her medications instead of pretzels  Lunch: tries to stick to salads with some protein, tries not to use bottled dressing, packs her lunch   Dinner: trying to eat salads as much as she can, will sometimes have sausage with them, kielbasa and sour kraut with mashed potatoes  Snacks: does snack on pretzels in between meals, avoids sweets, sometimes crackers with spread-able cheese  Fluids: water, hot tea  Still has not been eating fast food or out at restaurants  Since last encounter has been eating less carbs and watching what she eats   Has been eating more fresh fruits which can increase blood sugar     Current exercise:   Has 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 which limits her ability to exercise and walk throughout the day     Sodium intake:   Sometimes makes soups with canned black beans and green beans and other vegetables  Snacks on pretzels and crackers  Does not add salt to meals, will sometimes add salt to meals when cooking     Allergies   Allergen Reactions    Doxycycline Hives    Sulfamethoxazole-Trimethoprim Hives    Cephalexin Itching and Rash     Pruritus    Penicillins Headache, Unknown and Rash     rash    headache    rash   headache     Diabetes  The patient does have a known family history of diabetes (mother)     Patient is using: continuous glucose monitor (Codi 3) to self monitor blood sugar at home   Since last pharmacy encounter patient's time in range has decreased from 52% at last office visit to 40% over the time included in the report below           Hypoglycemia frequency: 0%  Hypoglycemia awareness:  "Yes     Objective     Wt Readings from Last 3 Encounters:   07/20/24 99.6 kg (219 lb 9.3 oz)   07/17/24 99.6 kg (219 lb 9.6 oz)   02/19/24 98.6 kg (217 lb 6.4 oz)     Estimated body mass index is 37.12 kg/m² as calculated from the following:    Height as of 7/20/24: 1.638 m (5' 4.49\").    Weight as of 7/20/24: 99.6 kg (219 lb 9.3 oz).    Diabetes Pharmacotherapy:  Glyburide 5 mg 2 tablets twice daily   Metformin 1,000 mg twice daily   Januvia 100 mg once daily     Historical Diabetes Pharmacotherapy:   None     Adverse Effects:   Notes increased swelling in her legs since starting Januvia, also thinks that it may be due to her not wearing her compression stockings as much as well     Adherence:   States she never misses doses of her medications     SECONDARY PREVENTION  Statin? No  ACE-I/ARB? No  Aspirin? No    Pertinent PMH Review:  PMH of Pancreatitis: No  PMH of Retinopathy: No  PMH of Urinary Tract Infections: Yes, does not get frequently   PMH of MTC: No    Medication Reconciliation  No changes were made to patient's medication list during encounter today     Current Outpatient Medications on File Prior to Visit   Medication Sig Dispense Refill    alcohol swabs pads, medicated USE TO CLEANSE SKIN PRIOR TO BLOOD GLUCOSE TESTING TWICE DAILY 200 each 3    atenolol (Tenormin) 25 mg tablet Take 1 tablet (25 mg) by mouth 2 times a day. 180 tablet 3    blood sugar diagnostic (OneTouch Verio test strips) strip TEST BLOOD GLUCOSE TWICE DAILY. 200 strip 3    blood-glucose sensor (FreeStyle Codi 3 Sensor) device Apply 1 sensor to the back of the arm for continuous glucose monitoring. Remove and apply new sensor every 14 days 6 each 3    clobetasoL-emollient 0.05 % cream APPY AND GENTLY MASSAGE INTO AFFECTED AREA(S) TWICE DAILY AS NEEDED FOR ITCHING 180 g 3    cyanocobalamin (Vitamin B-12) 1,000 mcg/mL injection Inject 1 mL (1,000 mcg) into the muscle every 30 (thirty) days. 12 mL 0    ergocalciferol (Vitamin D-2) 1.25 MG " "(68152 UT) capsule Take 1 capsule (1,250 mcg) by mouth 1 (one) time per week. 4 capsule 3    estradiol (Estrace) 0.01 % (0.1 mg/gram) vaginal cream APPLY 0.5 GRAMS VAGINALLY WEEKLY AS DIRECTED 127.5 g 3    fluticasone (Flonase) 50 mcg/actuation nasal spray Administer 2 sprays into each nostril once daily as needed for rhinitis or allergies. 48 g 3    glyBURIDE (Diabeta) 5 mg tablet Take 2 tablets (10 mg) by mouth 2 times a day. 360 tablet 2    lancets (OneTouch Delica Plus Lancet) 33 gauge misc USE TO TEST TWICE DAILY. 200 each 3    levothyroxine (Synthroid, Levoxyl) 50 mcg tablet Take 1 tablet (50 mcg) by mouth once daily. 90 tablet 3    lisinopril 20 mg tablet Take 1 tablet (20 mg) by mouth once daily. 90 tablet 3    meclizine (Antivert) 12.5 mg tablet TAKE 1-2 TABLETS BY MOUTH EVERY 4-6 HOURS PRN FOR DIZZINESS. CAN CAUSE DROWSINESS. 30 tablet 3    metFORMIN (Glucophage) 500 mg tablet Take 2 tablets (1,000 mg) by mouth 2 times a day. 360 tablet 3    nystatin-triamcinolone (Mycolog II) cream Apply 1 Application topically 3 times a day as needed. 180 g 3    OneTouch Verio Meter misc test as directed      syringe with needle (BD Luer-Adrian Syringe) 3 mL 25 gauge x 1\" syringe USE FOR VITAMIN B12 INJECTIONS ONCE A MONTH 4 each 3    triamterene-hydrochlorothiazid (Maxzide-25) 37.5-25 mg tablet Take 1 tablet by mouth once daily. Take with food. 90 tablet 3    [DISCONTINUED] SITagliptin phosphate (Januvia) 100 mg tablet Take 1 tablet (100 mg) by mouth once daily. 90 tablet 1     No current facility-administered medications on file prior to visit.      Lab Review  Lab Results   Component Value Date    BILITOT 0.5 08/10/2024    CALCIUM 10.9 (H) 08/10/2024    CO2 29 08/10/2024     08/10/2024    CREATININE 0.79 08/10/2024    GLUCOSE 152 (H) 08/10/2024    ALKPHOS 60 08/10/2024    K 4.2 08/10/2024    PROT 6.3 (L) 08/10/2024     08/10/2024    AST 15 08/10/2024    ALT 19 08/10/2024    BUN 18 08/10/2024    ANIONGAP 13 " 08/10/2024    MG 1.70 04/20/2024    PHOS 3.0 01/30/2021    ALBUMIN 3.8 08/10/2024    AMYLASE 86 12/02/2020    LIPASE 122 (H) 12/02/2020    GFRF 64 08/24/2023    GFRMALE CANCELED 08/24/2023     Lab Results   Component Value Date    TRIG 347 (H) 04/20/2024    CHOL 235 (H) 04/20/2024    LDLCALC 127 (H) 04/20/2024    HDL 38.7 04/20/2024     Lab Results   Component Value Date    HGBA1C 7.6 (H) 08/10/2024    HGBA1C 7.9 (H) 04/20/2024    HGBA1C 6.7 (H) 12/02/2023     The 10-year ASCVD risk score (Duane ZHOU, et al., 2019) is: 17.1%    Values used to calculate the score:      Age: 64 years      Sex: Female      Is Non- : No      Diabetic: Yes      Tobacco smoker: No      Systolic Blood Pressure: 127 mmHg      Is BP treated: Yes      HDL Cholesterol: 38.7 mg/dL      Total Cholesterol: 235 mg/dL    Health Maintenance:   Foot Exam: None  Eye Exam: ~8 months ago   Lipid Panel:  mg/dL,  mg/dL 04/20/24  Urine Albumin: Unable to calculate due to albumin <7 04/20/24  Influenza Vaccine: 10/19/2023  Pneumonia Vaccine: PPSV23 07/09/13    Drug Interactions:  Additive CNS Depression: lorazepam, tramadol, meclizine   Patient only uses Lorazepam for flying and will only use meclizine when needed for vertigo (states she rarely uses it)      Assessment/Plan   Problem List Items Addressed This Visit       Type 2 diabetes mellitus without complication, without long-term current use of insulin (Multi)     Patient's goal A1c is < 7%.  Is pt at goal? No, most recent A1c was 7.6% on 08/10/24 which had decreased slightly from 7.9% on 04/20/24.  Patient's time in range on her Codi has worsened to 40% which is down from 52% at last encounter     Rationale for plan:   Patient has been taking Januvia once daily but states she doesn't notice a difference in her blood sugar  Does also notice increased lower extremity edema but states she also has not been using her compression socks as much so is unsure if it is due  to Januvia or noncompliance with compression socks  Patient requires further drug therapy for her diabetes. We discussed options of SGLT2 inhibitors and GLP-1 agonists. Patient would prefer to avoid the injectables and would prefer Jardiance/Farxiga over Rybelsus.     Medication Changes:  CONTINUE:  Glyburide 5 mg 2 tablets by mouth twice daily  Metformin 1,000 mg 1 tablet by mouth twice daily   Januvia 100 mg 1 tablet by mouth once daily   START:  Farxiga 5 mg 1 tablet by mouth once daily     Future Considerations:  If tolerable will likely increase up to 10 mg at next follow-up  Could add on Rybelsus in the future if blood sugars are still uncontrolled and patient would prefer to avoid injectables     Monitoring and Education:  We will review updated 4-week Codi report at next follow-up  Next A1c anytime after 11/10/24  Farxiga Education:  Counseled patient on Farxiga MOA, expectations, side effects, duration of therapy, administration, and monitoring parameters.  Reviewed the benefits of SGLT-2i therapy, such as glycemic control and kidney and CV protection.  Advised patient to practice proper  hygiene to reduce risk of UTIs or yeast infections  Answered all patient questions and concerns.  Patient is currently using Codi 3 sensors but states they have been having issues with errors. Codi 3 sensors are currently on backorder. Prescription submitted for Codi 3 Plus sensors for patient to fill if Codi 3 remains on backorder.     We will plan to follow-up in ~1 month to see how she has done with Farxiga. She was encouraged to reach out with any questions and/or concerns prior to then.           Other Visit Diagnoses       Uncontrolled type 2 diabetes mellitus with hyperglycemia        Relevant Medications    dapagliflozin propanediol (Farxiga) 5 mg    SITagliptin phosphate (Januvia) 100 mg tablet    blood-glucose sensor (FreeStyle Codi 3 Plus Sensor) device    Other Relevant Orders    Referral to Clinical  Pharmacy          Pharmacy Follow-Up: 11/22/2024   PCP Follow-Up: 11/13/2024    Jorge Miller PharmD     Continue all meds under the continuation of care with the referring provider and clinical pharmacy team.

## 2024-10-25 ENCOUNTER — APPOINTMENT (OUTPATIENT)
Dept: PHARMACY | Facility: HOSPITAL | Age: 64
End: 2024-10-25
Payer: COMMERCIAL

## 2024-10-25 DIAGNOSIS — E11.65 UNCONTROLLED TYPE 2 DIABETES MELLITUS WITH HYPERGLYCEMIA: ICD-10-CM

## 2024-10-25 DIAGNOSIS — E11.9 TYPE 2 DIABETES MELLITUS WITHOUT COMPLICATION, WITHOUT LONG-TERM CURRENT USE OF INSULIN (MULTI): ICD-10-CM

## 2024-10-25 PROCEDURE — RXMED WILLOW AMBULATORY MEDICATION CHARGE

## 2024-10-25 RX ORDER — HYDROCHLOROTHIAZIDE 12.5 MG/1
CAPSULE ORAL
Qty: 2 EACH | Refills: 11 | Status: SHIPPED | OUTPATIENT
Start: 2024-10-25

## 2024-10-25 RX ORDER — DAPAGLIFLOZIN 5 MG/1
5 TABLET, FILM COATED ORAL DAILY
Qty: 30 TABLET | Refills: 1 | Status: SHIPPED | OUTPATIENT
Start: 2024-10-25 | End: 2024-12-24

## 2024-10-25 NOTE — ASSESSMENT & PLAN NOTE
Patient's goal A1c is < 7%.  Is pt at goal? No, most recent A1c was 7.6% on 08/10/24 which had decreased slightly from 7.9% on 04/20/24.  Patient's time in range on her Codi has worsened to 40% which is down from 52% at last encounter     Rationale for plan:   Patient has been taking Januvia once daily but states she doesn't notice a difference in her blood sugar  Does also notice increased lower extremity edema but states she also has not been using her compression socks as much so is unsure if it is due to Januvia or noncompliance with compression socks  Patient requires further drug therapy for her diabetes. We discussed options of SGLT2 inhibitors and GLP-1 agonists. Patient would prefer to avoid the injectables and would prefer Jardiance/Farxiga over Rybelsus.     Medication Changes:  CONTINUE:  Glyburide 5 mg 2 tablets by mouth twice daily  Metformin 1,000 mg 1 tablet by mouth twice daily   Januvia 100 mg 1 tablet by mouth once daily   START:  Farxiga 5 mg 1 tablet by mouth once daily     Future Considerations:  If tolerable will likely increase up to 10 mg at next follow-up  Could add on Rybelsus in the future if blood sugars are still uncontrolled and patient would prefer to avoid injectables     Monitoring and Education:  We will review updated 4-week Codi report at next follow-up  Next A1c anytime after 11/10/24  Farxiga Education:  Counseled patient on Farxiga MOA, expectations, side effects, duration of therapy, administration, and monitoring parameters.  Reviewed the benefits of SGLT-2i therapy, such as glycemic control and kidney and CV protection.  Advised patient to practice proper  hygiene to reduce risk of UTIs or yeast infections  Answered all patient questions and concerns.  Patient is currently using Codi 3 sensors but states they have been having issues with errors. Codi 3 sensors are currently on backorder. Prescription submitted for Codi 3 Plus sensors for patient to fill if Codi 3  remains on backorder.     We will plan to follow-up in ~1 month to see how she has done with Farxiga. She was encouraged to reach out with any questions and/or concerns prior to then.

## 2024-10-26 ENCOUNTER — PHARMACY VISIT (OUTPATIENT)
Dept: PHARMACY | Facility: CLINIC | Age: 64
End: 2024-10-26
Payer: COMMERCIAL

## 2024-11-04 DIAGNOSIS — E11.9 TYPE 2 DIABETES MELLITUS WITHOUT COMPLICATION, WITHOUT LONG-TERM CURRENT USE OF INSULIN (MULTI): ICD-10-CM

## 2024-11-04 DIAGNOSIS — I10 PRIMARY HYPERTENSION: ICD-10-CM

## 2024-11-04 RX ORDER — METFORMIN HYDROCHLORIDE 500 MG/1
1000 TABLET ORAL 2 TIMES DAILY
Qty: 360 TABLET | Refills: 3 | Status: SHIPPED | OUTPATIENT
Start: 2024-11-04

## 2024-11-04 RX ORDER — TRIAMTERENE/HYDROCHLOROTHIAZID 37.5-25 MG
1 TABLET ORAL DAILY
Qty: 90 TABLET | Refills: 3 | Status: SHIPPED | OUTPATIENT
Start: 2024-11-04

## 2024-11-04 RX ORDER — ATENOLOL 25 MG/1
25 TABLET ORAL 2 TIMES DAILY
Qty: 180 TABLET | Refills: 3 | Status: SHIPPED | OUTPATIENT
Start: 2024-11-04

## 2024-11-13 ENCOUNTER — CLINICAL SUPPORT (OUTPATIENT)
Dept: PRIMARY CARE | Facility: CLINIC | Age: 64
End: 2024-11-13
Payer: COMMERCIAL

## 2024-11-13 ENCOUNTER — PHARMACY VISIT (OUTPATIENT)
Dept: PHARMACY | Facility: CLINIC | Age: 64
End: 2024-11-13
Payer: COMMERCIAL

## 2024-11-13 ENCOUNTER — APPOINTMENT (OUTPATIENT)
Dept: PRIMARY CARE | Facility: CLINIC | Age: 64
End: 2024-11-13
Payer: COMMERCIAL

## 2024-11-13 VITALS
TEMPERATURE: 97.9 F | HEART RATE: 68 BPM | DIASTOLIC BLOOD PRESSURE: 80 MMHG | OXYGEN SATURATION: 92 % | BODY MASS INDEX: 37.97 KG/M2 | WEIGHT: 224.6 LBS | SYSTOLIC BLOOD PRESSURE: 138 MMHG

## 2024-11-13 DIAGNOSIS — E78.49 OTHER HYPERLIPIDEMIA: ICD-10-CM

## 2024-11-13 DIAGNOSIS — D32.9 MENINGIOMA (MULTI): ICD-10-CM

## 2024-11-13 DIAGNOSIS — E11.9 TYPE 2 DIABETES MELLITUS WITHOUT COMPLICATION, WITHOUT LONG-TERM CURRENT USE OF INSULIN (MULTI): ICD-10-CM

## 2024-11-13 DIAGNOSIS — E21.3 HYPERPARATHYROIDISM (MULTI): ICD-10-CM

## 2024-11-13 DIAGNOSIS — E53.8 VITAMIN B12 DEFICIENCY: ICD-10-CM

## 2024-11-13 DIAGNOSIS — D58.2 ELEVATED HEMOGLOBIN (CMS-HCC): ICD-10-CM

## 2024-11-13 DIAGNOSIS — J30.9 ALLERGIC RHINITIS, UNSPECIFIED SEASONALITY, UNSPECIFIED TRIGGER: ICD-10-CM

## 2024-11-13 DIAGNOSIS — E83.52 HYPERCALCEMIA: ICD-10-CM

## 2024-11-13 DIAGNOSIS — E11.9 TYPE 2 DIABETES MELLITUS WITHOUT COMPLICATION, WITHOUT LONG-TERM CURRENT USE OF INSULIN (MULTI): Primary | ICD-10-CM

## 2024-11-13 DIAGNOSIS — E55.9 VITAMIN D DEFICIENCY: ICD-10-CM

## 2024-11-13 DIAGNOSIS — I10 BENIGN ESSENTIAL HYPERTENSION: Primary | ICD-10-CM

## 2024-11-13 DIAGNOSIS — I10 PRIMARY HYPERTENSION: ICD-10-CM

## 2024-11-13 DIAGNOSIS — Z12.11 COLON CANCER SCREENING: ICD-10-CM

## 2024-11-13 DIAGNOSIS — E03.9 ACQUIRED HYPOTHYROIDISM: ICD-10-CM

## 2024-11-13 DIAGNOSIS — Z00.00 ANNUAL PHYSICAL EXAM: ICD-10-CM

## 2024-11-13 DIAGNOSIS — K21.9 GASTROESOPHAGEAL REFLUX DISEASE WITHOUT ESOPHAGITIS: ICD-10-CM

## 2024-11-13 PROCEDURE — 90677 PCV20 VACCINE IM: CPT | Performed by: INTERNAL MEDICINE

## 2024-11-13 PROCEDURE — 3062F POS MACROALBUMINURIA REV: CPT | Performed by: INTERNAL MEDICINE

## 2024-11-13 PROCEDURE — 3079F DIAST BP 80-89 MM HG: CPT | Performed by: INTERNAL MEDICINE

## 2024-11-13 PROCEDURE — 3049F LDL-C 100-129 MG/DL: CPT | Performed by: INTERNAL MEDICINE

## 2024-11-13 PROCEDURE — 90471 IMMUNIZATION ADMIN: CPT | Performed by: INTERNAL MEDICINE

## 2024-11-13 PROCEDURE — RXMED WILLOW AMBULATORY MEDICATION CHARGE

## 2024-11-13 PROCEDURE — 1160F RVW MEDS BY RX/DR IN RCRD: CPT | Performed by: INTERNAL MEDICINE

## 2024-11-13 PROCEDURE — 99214 OFFICE O/P EST MOD 30 MIN: CPT | Performed by: INTERNAL MEDICINE

## 2024-11-13 PROCEDURE — 1159F MED LIST DOCD IN RCRD: CPT | Performed by: INTERNAL MEDICINE

## 2024-11-13 PROCEDURE — 3051F HG A1C>EQUAL 7.0%<8.0%: CPT | Performed by: INTERNAL MEDICINE

## 2024-11-13 PROCEDURE — 4010F ACE/ARB THERAPY RXD/TAKEN: CPT | Performed by: INTERNAL MEDICINE

## 2024-11-13 PROCEDURE — 3075F SYST BP GE 130 - 139MM HG: CPT | Performed by: INTERNAL MEDICINE

## 2024-11-13 RX ORDER — CYANOCOBALAMIN 1000 UG/ML
1000 INJECTION, SOLUTION INTRAMUSCULAR; SUBCUTANEOUS
Qty: 3 ML | Refills: 3 | Status: SHIPPED | OUTPATIENT
Start: 2024-11-13 | End: 2025-11-13

## 2024-11-13 RX ORDER — TIRZEPATIDE 2.5 MG/.5ML
2.5 INJECTION, SOLUTION SUBCUTANEOUS WEEKLY
Qty: 2 ML | Refills: 0 | Status: SHIPPED | OUTPATIENT
Start: 2024-11-13

## 2024-11-13 RX ORDER — SYRINGE WITH NEEDLE, 1 ML 25GX5/8"
SYRINGE, EMPTY DISPOSABLE MISCELLANEOUS
Qty: 3 EACH | Refills: 3 | Status: SHIPPED | OUTPATIENT
Start: 2024-11-13

## 2024-11-13 RX ORDER — FLUTICASONE PROPIONATE 50 MCG
2 SPRAY, SUSPENSION (ML) NASAL DAILY PRN
Qty: 48 G | Refills: 3 | Status: SHIPPED | OUTPATIENT
Start: 2024-11-13

## 2024-11-13 RX ORDER — LISINOPRIL 20 MG/1
20 TABLET ORAL DAILY
Qty: 90 TABLET | Refills: 3 | Status: SHIPPED | OUTPATIENT
Start: 2024-11-13

## 2024-11-13 ASSESSMENT — PATIENT HEALTH QUESTIONNAIRE - PHQ9
SUM OF ALL RESPONSES TO PHQ9 QUESTIONS 1 AND 2: 0
2. FEELING DOWN, DEPRESSED OR HOPELESS: NOT AT ALL
1. LITTLE INTEREST OR PLEASURE IN DOING THINGS: NOT AT ALL

## 2024-11-13 ASSESSMENT — ENCOUNTER SYMPTOMS
VISUAL CHANGE: 0
POLYDIPSIA: 0
HYPERTENSION: 1
DIABETIC ASSOCIATED SYMPTOMS: 0

## 2024-11-13 NOTE — PROGRESS NOTES
Patient is sent at the request of Whit Kenyon MD for my opinion regarding Type 2 diabetes and hypertension.  My final recommendations will be communicated back to the requesting provider by way of shared medical record.    Subjective     Diabetes  She presents for her follow-up diabetic visit. She has type 2 diabetes mellitus. The initial diagnosis of diabetes was made 16 years ago. Her disease course has been improving. There are no hypoglycemic associated symptoms. There are no diabetic associated symptoms. Pertinent negatives for diabetes include no polydipsia, no polyuria and no visual change. (Notices more symptoms when she eats higher carbohydrates) There are no hypoglycemic complications. Symptoms are stable. There are no diabetic complications. Risk factors for coronary artery disease include diabetes mellitus, dyslipidemia, hypertension and obesity. Current diabetic treatment includes oral agent (triple therapy). She is compliant with treatment all of the time. Her weight is increasing steadily. She is following a generally healthy diet. She rarely participates in exercise. Her overall blood glucose range is >200 mg/dl. An ACE inhibitor/angiotensin II receptor blocker is being taken. She does not see a podiatrist.Eye exam is current.     When patient started with the Codi she was on a very strict diet with protein and vegetables which led to her A1c decreasing down to 6.7% in December 2023.     Since then her A1c had increased back up to 7.9% on 04/20/24 which patient reports was due to her experimenting with foods to see how they affect her blood sugar and to learn what she could and could not eat. There was some improvement with reading on 08/10/24 at 7.6%.     At last pharmacy encounter time in range was still not at goal so patient was started on Farxiga 5 mg once daily. Today notes that she took one tablet of Farxiga yesterday and experienced significant dizziness. She saw Dr. Kenyon  in-office today and they discussed starting Mounjaro in place of Farxiga and Januvia. We are meeting today to discuss starting once weekly Mounjaro.     Current diet:   Trying to eat plant based with no processed foods   Breakfast: takes some pretzels with her medications in the morning, coffee with half and half creamer, low sugar instant oatmeal at work, will also usually have a banana   Wants to try to eat an egg in the morning with her medications instead of pretzels  Lunch: tries to stick to salads with some protein, tries not to use bottled dressing, packs her lunch   Dinner: trying to eat salads as much as she can, will sometimes have sausage with them, kielbasa and sour kraut with mashed potatoes  Snacks: does snack on pretzels in between meals, avoids sweets, sometimes crackers with spread-able cheese  Fluids: water, hot tea  Still has not been eating fast food or out at restaurants  Since last encounter has been eating less carbs and watching what she eats   Has been eating more fresh fruits which can increase blood sugar     Current exercise:   Has 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 which limits her ability to exercise and walk throughout the day     Sodium intake:   Sometimes makes soups with canned black beans and green beans and other vegetables  Snacks on pretzels and crackers  Does not add salt to meals, will sometimes add salt to meals when cooking     Allergies   Allergen Reactions    Doxycycline Hives    Sulfamethoxazole-Trimethoprim Hives    Cephalexin Itching and Rash     Pruritus    Penicillins Headache, Unknown and Rash     rash    headache    rash   headache     Diabetes  The patient does have a known family history of diabetes (mother)     Patient is using: continuous glucose monitor (Codi 3) to self monitor blood sugar at home   Since last pharmacy encounter patient's time in range has decreased from 40% to 18% over the past 2 weeks despite starting Farxiga             Hypoglycemia frequency:  "0%  Hypoglycemia awareness: Yes     Objective     Wt Readings from Last 3 Encounters:   11/13/24 102 kg (224 lb 9.6 oz)   07/20/24 99.6 kg (219 lb 9.3 oz)   07/17/24 99.6 kg (219 lb 9.6 oz)     Estimated body mass index is 37.97 kg/m² as calculated from the following:    Height as of 7/20/24: 1.638 m (5' 4.49\").    Weight as of an earlier encounter on 11/13/24: 102 kg (224 lb 9.6 oz).    Diabetes Pharmacotherapy:  Glyburide 5 mg 2 tablets twice daily   Metformin 1,000 mg twice daily   Januvia 100 mg once daily   Farxiga 5 mg once daily     Historical Diabetes Pharmacotherapy:   None     Adverse Effects:   Notes increased swelling in her legs since starting Januvia, also thinks that it may be due to her not wearing her compression stockings as much as well     Adherence:   States she never misses doses of her medications     SECONDARY PREVENTION  Statin? No  ACE-I/ARB? No  Aspirin? No    Pertinent PMH Review:  PMH of Pancreatitis: No  PMH of Retinopathy: No  PMH of Urinary Tract Infections: Yes, does not get frequently   PMH of MTC: No    Medication Reconciliation  No changes were made to patient's medication list during encounter today     Current Outpatient Medications on File Prior to Visit   Medication Sig Dispense Refill    alcohol swabs pads, medicated USE TO CLEANSE SKIN PRIOR TO BLOOD GLUCOSE TESTING TWICE DAILY 200 each 3    atenolol (Tenormin) 25 mg tablet Take 1 tablet (25 mg) by mouth 2 times a day. 180 tablet 3    blood sugar diagnostic (OneTouch Verio test strips) strip TEST BLOOD GLUCOSE TWICE DAILY. 200 strip 3    blood-glucose sensor (FreeStyle Codi 3 Plus Sensor) device Apply 1 sensor to the back of the arm for continuous monitoring of blood sugar. Remove and apply new sensor every 15 days. 2 each 11    blood-glucose sensor (FreeStyle Codi 3 Sensor) device Apply 1 sensor to the back of the arm for continuous glucose monitoring. Remove and apply new sensor every 14 days 6 each 3    " "clobetasoL-emollient 0.05 % cream APPY AND GENTLY MASSAGE INTO AFFECTED AREA(S) TWICE DAILY AS NEEDED FOR ITCHING 180 g 3    cyanocobalamin (Vitamin B-12) 1,000 mcg/mL injection Inject 1 mL (1,000 mcg) into the muscle every 30 (thirty) days. 3 mL 3    ergocalciferol (Vitamin D-2) 1.25 MG (49380 UT) capsule Take 1 capsule (1,250 mcg) by mouth 1 (one) time per week. 4 capsule 3    estradiol (Estrace) 0.01 % (0.1 mg/gram) vaginal cream APPLY 0.5 GRAMS VAGINALLY WEEKLY AS DIRECTED 127.5 g 3    fluticasone (Flonase) 50 mcg/actuation nasal spray Administer 2 sprays into each nostril once daily as needed for rhinitis or allergies. 48 g 3    glyBURIDE (Diabeta) 5 mg tablet Take 2 tablets (10 mg) by mouth 2 times a day. 360 tablet 2    lancets (OneTouch Delica Plus Lancet) 33 gauge misc USE TO TEST TWICE DAILY. 200 each 3    levothyroxine (Synthroid, Levoxyl) 50 mcg tablet Take 1 tablet (50 mcg) by mouth once daily. 90 tablet 3    lisinopril 20 mg tablet Take 1 tablet (20 mg) by mouth once daily. 90 tablet 3    meclizine (Antivert) 12.5 mg tablet TAKE 1-2 TABLETS BY MOUTH EVERY 4-6 HOURS PRN FOR DIZZINESS. CAN CAUSE DROWSINESS. 30 tablet 3    metFORMIN (Glucophage) 500 mg tablet Take 2 tablets (1,000 mg) by mouth 2 times a day. 360 tablet 3    nystatin-triamcinolone (Mycolog II) cream Apply 1 Application topically 3 times a day as needed. 180 g 3    OneTouch Verio Meter misc test as directed      syringe with needle (BD Luer-Adrian Syringe) 3 mL 25 gauge x 1\" syringe USE FOR VITAMIN B12 INJECTIONS ONCE A MONTH 3 each 3    triamterene-hydrochlorothiazid (Maxzide-25) 37.5-25 mg tablet Take 1 tablet by mouth once daily. Take with food. 90 tablet 3    [DISCONTINUED] cyanocobalamin (Vitamin B-12) 1,000 mcg/mL injection Inject 1 mL (1,000 mcg) into the muscle every 30 (thirty) days. 12 mL 0    [DISCONTINUED] dapagliflozin propanediol (Farxiga) 5 mg Take 1 tablet (5 mg) by mouth once daily. 30 tablet 1    [DISCONTINUED] fluticasone " "(Flonase) 50 mcg/actuation nasal spray Administer 2 sprays into each nostril once daily as needed for rhinitis or allergies. 48 g 3    [DISCONTINUED] lisinopril 20 mg tablet Take 1 tablet (20 mg) by mouth once daily. 90 tablet 3    [DISCONTINUED] SITagliptin phosphate (Januvia) 100 mg tablet Take 1 tablet (100 mg) by mouth once daily. 90 tablet 0    [DISCONTINUED] syringe with needle (BD Luer-Adrian Syringe) 3 mL 25 gauge x 1\" syringe USE FOR VITAMIN B12 INJECTIONS ONCE A MONTH 4 each 3     No current facility-administered medications on file prior to visit.      Lab Review  Lab Results   Component Value Date    BILITOT 0.5 08/10/2024    CALCIUM 10.9 (H) 08/10/2024    CO2 29 08/10/2024     08/10/2024    CREATININE 0.79 08/10/2024    GLUCOSE 152 (H) 08/10/2024    ALKPHOS 60 08/10/2024    K 4.2 08/10/2024    PROT 6.3 (L) 08/10/2024     08/10/2024    AST 15 08/10/2024    ALT 19 08/10/2024    BUN 18 08/10/2024    ANIONGAP 13 08/10/2024    MG 1.70 04/20/2024    PHOS 3.0 01/30/2021    ALBUMIN 3.8 08/10/2024    AMYLASE 86 12/02/2020    LIPASE 122 (H) 12/02/2020    GFRF 64 08/24/2023    GFRMALE CANCELED 08/24/2023     Lab Results   Component Value Date    TRIG 347 (H) 04/20/2024    CHOL 235 (H) 04/20/2024    LDLCALC 127 (H) 04/20/2024    HDL 38.7 04/20/2024     Lab Results   Component Value Date    HGBA1C 7.6 (H) 08/10/2024    HGBA1C 7.9 (H) 04/20/2024    HGBA1C 6.7 (H) 12/02/2023     The 10-year ASCVD risk score (Duane ZHOU, et al., 2019) is: 19.9%    Values used to calculate the score:      Age: 64 years      Sex: Female      Is Non- : No      Diabetic: Yes      Tobacco smoker: No      Systolic Blood Pressure: 138 mmHg      Is BP treated: Yes      HDL Cholesterol: 38.7 mg/dL      Total Cholesterol: 235 mg/dL    Health Maintenance:   Foot Exam: None  Eye Exam: ~8 months ago   Lipid Panel:  mg/dL,  mg/dL 04/20/24  Urine Albumin: Unable to calculate due to albumin <7 " 24  Influenza Vaccine: 10/05/24  Pneumonia Vaccine: PPSV23 13    Drug Interactions:  Additive CNS Depression: lorazepam, tramadol, meclizine   Patient only uses Lorazepam for flying and will only use meclizine when needed for vertigo (states she rarely uses it)    Completed in person education for the administration of once-weekly Mounjaro:  Instructed patient that Mounjaro must be kept refrigerated, and if necessary a pen may be stored unrefrigerated at temperatures not to exceed 30C (86F) for up to 21 days.   Remove the Pen from the refrigerator. Leave the base cap on until you are ready to inject.  Check the Pen label to make sure you have the right medicine and it has not . Additionally, ensure that the solution is not cloudy, discolored, or has particles in it.  Prior to administration, wash and rinse hands.   Next, choose your injection site (You may inject into your abdomen or thigh; another person may give you the injection in your upper arm).  Change (rotate) your injection site each week. You may use the same area of your body, but be sure to choose a different injection site in that area.  Once administration site has been selected, use a new alcohol swab to sanitize the administration site and allow to air dry.  First, make sure the pen is in the locked position. While still in the locked position remove the base cap (gray cap) and dispose into a regular trash. Do not attempt to put the base cap back on, this may damage the needle.  Place the Clear Base flat and firmly against your skin at the injection site.   Press and hold the purple injection button. You will hear a loud click. Continue holding the Clear Base firmly against your skin until you hear a second click. Do not rub the area after administration  Dispose of the pen in a sharps container (i.e. Coffee can, laundry detergent bottle)  Injections should be done on the same day every week, if you forget you have up to 4 days (96  hours) to remember to do your next dose.     Patient also educated on common side effects and warnings:  Increased satiety is common  Stomach upset such as stomach cramping, nausea, constipation, etc which can be precipitated by eating fatty greasy foods and overeating  Discussed risks of GLP1ra including risk of pancreatitis, MTC and worsening of DR  Also discussed risks of gastroparesis and gastroparalysis as this is a common discussion point in the news - patient was informed that this is rare and they have no risk factors increasing their risk for developing these issues       Assessment/Plan   Problem List Items Addressed This Visit       Type 2 diabetes mellitus without complication, without long-term current use of insulin (Multi) - Primary     Patient's goal A1c is < 7%.  Is pt at goal? No, most recent A1c was 7.6% on 08/10/24 which had decreased slightly from 7.9% on 04/20/24.  Patient's time in range on her Codi has worsened to 18% over the past 14 days which is down from 40% at last encounter   Goal time in range is >70%    Rationale for plan:   Patient did not tolerate first dose of Farxiga due to dizziness and would like to avoid taking it  Blood sugars have continued to worsen over the past 2 weeks  Patient is agreeable to starting Mounjaro in place of her Farxiga and Januvia now    Medication Changes:  CONTINUE:  Glyburide 5 mg 2 tablets by mouth twice daily  Metformin 1,000 mg 1 tablet by mouth twice daily   START:  Mounjaro 2.5 mg under the skin once weekly   STOP:   Januvia 100 mg   Farxiga 5 mg     Future Considerations:  If tolerable, will plan to increase Mounjaro up to 5 mg once weekly at next follow-up    Monitoring and Education:  We will review updated 4-week Codi report at next follow-up  Next A1c is due at anytime, patient states she plans to have her updated labs completed in March   Patient educated on administration of once weekly Mounjaro and common side effects and boxed warnings  with therapy as described above  We will follow-up to assess any side effects with Mounjaro therapy as well    We will plan to follow-up in ~3 weeks to see how patient has tolerated the first few weeks of Mounjaro therapy and to discuss titrating up her dose. She was encouraged to reach out with any questions and/or concerns prior to then.          Relevant Medications    tirzepatide (Mounjaro) 2.5 mg/0.5 mL pen injector     Pharmacy Follow-Up: 12/06/2024  PCP Follow-Up: 04/02/2025    Jorge Miller PharmD     Continue all meds under the continuation of care with the referring provider and clinical pharmacy team.

## 2024-11-13 NOTE — ASSESSMENT & PLAN NOTE
Patient's goal A1c is < 7%.  Is pt at goal? No, most recent A1c was 7.6% on 08/10/24 which had decreased slightly from 7.9% on 04/20/24.  Patient's time in range on her Codi has worsened to 18% over the past 14 days which is down from 40% at last encounter   Goal time in range is >70%    Rationale for plan:   Patient did not tolerate first dose of Farxiga due to dizziness and would like to avoid taking it  Blood sugars have continued to worsen over the past 2 weeks  Patient is agreeable to starting Mounjaro in place of her Farxiga and Januvia now    Medication Changes:  CONTINUE:  Glyburide 5 mg 2 tablets by mouth twice daily  Metformin 1,000 mg 1 tablet by mouth twice daily   START:  Mounjaro 2.5 mg under the skin once weekly   STOP:   Januvia 100 mg   Farxiga 5 mg     Future Considerations:  If tolerable, will plan to increase Mounjaro up to 5 mg once weekly at next follow-up    Monitoring and Education:  We will review updated 4-week Codi report at next follow-up  Next A1c is due at anytime, patient states she plans to have her updated labs completed in March   Patient educated on administration of once weekly Mounjaro and common side effects and boxed warnings with therapy as described above  We will follow-up to assess any side effects with Mounjaro therapy as well    We will plan to follow-up in ~3 weeks to see how patient has tolerated the first few weeks of Mounjaro therapy and to discuss titrating up her dose. She was encouraged to reach out with any questions and/or concerns prior to then.

## 2024-11-15 ENCOUNTER — CLINICAL SUPPORT (OUTPATIENT)
Dept: PRIMARY CARE | Facility: CLINIC | Age: 64
End: 2024-11-15
Payer: COMMERCIAL

## 2024-11-15 DIAGNOSIS — E53.8 VITAMIN B12 DEFICIENCY: ICD-10-CM

## 2024-11-15 DIAGNOSIS — Z23 NEED FOR TDAP VACCINATION: ICD-10-CM

## 2024-11-15 DIAGNOSIS — E11.9 TYPE 2 DIABETES MELLITUS WITHOUT COMPLICATION, WITHOUT LONG-TERM CURRENT USE OF INSULIN (MULTI): ICD-10-CM

## 2024-11-15 PROCEDURE — 90715 TDAP VACCINE 7 YRS/> IM: CPT | Performed by: INTERNAL MEDICINE

## 2024-11-15 PROCEDURE — 96372 THER/PROPH/DIAG INJ SC/IM: CPT | Performed by: INTERNAL MEDICINE

## 2024-11-15 PROCEDURE — 90471 IMMUNIZATION ADMIN: CPT | Performed by: INTERNAL MEDICINE

## 2024-11-15 RX ORDER — CYANOCOBALAMIN 1000 UG/ML
1000 INJECTION, SOLUTION INTRAMUSCULAR; SUBCUTANEOUS ONCE
Status: COMPLETED | OUTPATIENT
Start: 2024-11-15 | End: 2024-11-15

## 2024-11-15 RX ORDER — GLYBURIDE 5 MG/1
10 TABLET ORAL 2 TIMES DAILY
Qty: 360 TABLET | Refills: 3 | Status: SHIPPED | OUTPATIENT
Start: 2024-11-15

## 2024-11-15 NOTE — PROGRESS NOTES
Patient is here for a TDAP and a monthly vitamin B12 injection. Patient brought her own B12 injection to the office to be administered.

## 2024-11-17 ENCOUNTER — PATIENT MESSAGE (OUTPATIENT)
Dept: PRIMARY CARE | Facility: CLINIC | Age: 64
End: 2024-11-17
Payer: COMMERCIAL

## 2024-11-17 DIAGNOSIS — R11.0 NAUSEA: Primary | ICD-10-CM

## 2024-11-17 ASSESSMENT — ENCOUNTER SYMPTOMS
GASTROINTESTINAL NEGATIVE: 1
EYES NEGATIVE: 1
RESPIRATORY NEGATIVE: 1
CARDIOVASCULAR NEGATIVE: 1
HEMATOLOGIC/LYMPHATIC NEGATIVE: 1
PSYCHIATRIC NEGATIVE: 1
CONSTITUTIONAL NEGATIVE: 1

## 2024-11-17 NOTE — PROGRESS NOTES
Subjective   Patient ID: Shwetha Madrigal is a 64 y.o. female who presents for Follow-up (Patient is here for a 4 month follow up. ).    Here for follow up,she sees Jorge,pharmacist,for her DM.  Here to go over her lab results.  She has HTN,DM-2,HLD,hypothyroid,fatty liver.  She still has her vertigo since her covid 2 years ago.she has a desk job.she always uses her cane for ambulation.  She saw several specialist.  She sees ophthalmologist for eye exam.         Review of Systems   Constitutional: Negative.    HENT: Negative.     Eyes: Negative.    Respiratory: Negative.     Cardiovascular: Negative.    Gastrointestinal: Negative.    Genitourinary: Negative.    Hematological: Negative.    Psychiatric/Behavioral: Negative.         Objective   /80 (BP Location: Left arm, Patient Position: Sitting)   Pulse 68   Temp 36.6 °C (97.9 °F) (Temporal)   Wt 102 kg (224 lb 9.6 oz)   SpO2 92%   BMI 37.97 kg/m²     Physical Exam  Constitutional:       General: She is not in acute distress.     Appearance: Normal appearance.   HENT:      Head: Normocephalic and atraumatic.   Eyes:      Extraocular Movements: Extraocular movements intact.      Pupils: Pupils are equal, round, and reactive to light.   Neck:      Vascular: No carotid bruit.   Cardiovascular:      Rate and Rhythm: Normal rate and regular rhythm.      Heart sounds: Normal heart sounds.   Pulmonary:      Effort: Pulmonary effort is normal. No respiratory distress.      Breath sounds: Normal breath sounds. No wheezing or rhonchi.   Abdominal:      General: Abdomen is flat. Bowel sounds are normal. There is no distension.      Palpations: Abdomen is soft.   Musculoskeletal:      Cervical back: Normal range of motion and neck supple.      Right lower leg: No edema.      Left lower leg: No edema.      Comments: Using her cane for ambulation.   Skin:     General: Skin is warm.   Neurological:      General: No focal deficit present.      Mental Status: She is  "alert and oriented to person, place, and time.   Psychiatric:         Mood and Affect: Mood normal.         Behavior: Behavior normal.         Assessment/Plan   Problem List Items Addressed This Visit             ICD-10-CM    Vitamin B12 deficiency E53.8     Continue B12 and monitor level.         Relevant Medications    syringe with needle (BD Luer-Adrina Syringe) 3 mL 25 gauge x 1\" syringe    cyanocobalamin (Vitamin B-12) 1,000 mcg/mL injection    Other Relevant Orders    Vitamin B12    Annual physical exam Z00.00    Relevant Orders    CBC    Comprehensive Metabolic Panel    Lipid Panel    TSH with reflex to Free T4 if abnormal    Type 2 diabetes mellitus without complication, without long-term current use of insulin (Multi) E11.9     A1c not at goal consider Mounjaro, she will see our pharmacist to discuss treatment.         Relevant Orders    Hemoglobin A1C    Albumin-Creatinine Ratio, Urine Random    Vitamin D deficiency E55.9    Relevant Orders    Vitamin D 25-Hydroxy,Total (for eval of Vitamin D levels)    Other hyperlipidemia E78.49     Continue low-fat diet, did not tolerate statin.         Allergic rhinitis J30.9    Relevant Medications    fluticasone (Flonase) 50 mcg/actuation nasal spray    Elevated hemoglobin (CMS-HCC) D58.2    Esophageal reflux K21.9     Avoid offending food.  Continue antacid.         Hypercalcemia E83.52     Monitor CMP and PTH         Hyperparathyroidism (Multi) E21.3    Meningioma (Multi) D32.9    Hypothyroidism E03.9    Benign essential hypertension - Primary I10     Stable on current medication.         Relevant Medications    lisinopril 20 mg tablet     Other Visit Diagnoses         Codes    Colon cancer screening     Z12.11    Relevant Orders    Colonoscopy Screening; High Risk Patient; h/o colon polyp,family h/o colon ca in mother               "

## 2024-11-18 RX ORDER — ONDANSETRON 4 MG/1
4 TABLET, FILM COATED ORAL EVERY 8 HOURS PRN
Qty: 30 TABLET | Refills: 0 | Status: SHIPPED | OUTPATIENT
Start: 2024-11-18 | End: 2024-11-28

## 2024-11-22 ENCOUNTER — APPOINTMENT (OUTPATIENT)
Dept: PHARMACY | Facility: HOSPITAL | Age: 64
End: 2024-11-22
Payer: COMMERCIAL

## 2024-11-30 PROCEDURE — RXMED WILLOW AMBULATORY MEDICATION CHARGE

## 2024-12-02 ENCOUNTER — PHARMACY VISIT (OUTPATIENT)
Dept: PHARMACY | Facility: CLINIC | Age: 64
End: 2024-12-02
Payer: COMMERCIAL

## 2024-12-05 ASSESSMENT — ENCOUNTER SYMPTOMS
DIABETIC ASSOCIATED SYMPTOMS: 0
VISUAL CHANGE: 0
POLYDIPSIA: 0

## 2024-12-05 NOTE — PROGRESS NOTES
Patient is sent at the request of Whit Kenyon MD for my opinion regarding Type 2 diabetes and hypertension.  My final recommendations will be communicated back to the requesting provider by way of shared medical record.    Subjective     Diabetes  She presents for her follow-up diabetic visit. She has type 2 diabetes mellitus. The initial diagnosis of diabetes was made 16 years ago. Her disease course has been improving. There are no hypoglycemic associated symptoms. There are no diabetic associated symptoms. Pertinent negatives for diabetes include no polydipsia, no polyuria and no visual change. (Notices more symptoms when she eats higher carbohydrates) There are no hypoglycemic complications. Symptoms are stable. There are no diabetic complications. Risk factors for coronary artery disease include diabetes mellitus, dyslipidemia, hypertension and obesity. Current diabetic treatment includes oral agent (triple therapy). She is compliant with treatment all of the time. Her weight is increasing steadily. She is following a generally healthy diet. She rarely participates in exercise. Her overall blood glucose range is >200 mg/dl. An ACE inhibitor/angiotensin II receptor blocker is being taken. She does not see a podiatrist.Eye exam is current.     When patient started with the Cdoi she was on a very strict diet with protein and vegetables which led to her A1c decreasing down to 6.7% in December 2023.     Since then her A1c had increased back up to 7.9% on 04/20/24 which patient reports was due to her experimenting with foods to see how they affect her blood sugar and to learn what she could and could not eat.     At last pharmacy encounter she had seen Dr. Kenyon the same day and they discussed starting Mounjaro in place of Januvia. Patient was educated on the administration, side effects, and boxed warnings of the medication and was started at 2.5 mg once weekly.     We are following-up today to see how  she has tolerated the Mounjaro and to discuss titrating up to the next dose if tolerable.     Current diet:   Trying to eat plant based with no processed foods   Breakfast: takes some pretzels with her medications in the morning, coffee with half and half creamer, low sugar instant oatmeal at work, will also usually have a banana   Wants to try to eat an egg in the morning with her medications instead of pretzels  Lunch: tries to stick to salads with some protein, tries not to use bottled dressing, packs her lunch   Dinner: trying to eat salads as much as she can, will sometimes have sausage with them, kielbasa and sour kraut with mashed potatoes  Snacks: does snack on pretzels in between meals, avoids sweets, sometimes crackers with spread-able cheese  Fluids: water, hot tea  Still has not been eating fast food or out at restaurants  Since last encounter has been eating less carbs and watching what she eats   Has been eating more fresh fruits which can increase blood sugar     Current exercise:   Has 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 which limits her ability to exercise and walk throughout the day     Sodium intake:   Sometimes makes soups with canned black beans and green beans and other vegetables  Snacks on pretzels and crackers  Does not add salt to meals, will sometimes add salt to meals when cooking     Allergies   Allergen Reactions    Doxycycline Hives    Sulfamethoxazole-Trimethoprim Hives    Cephalexin Itching and Rash     Pruritus    Penicillins Headache, Unknown and Rash     rash    headache    rash   headache     Diabetes  The patient does have a known family history of diabetes (mother)     Patient is using: continuous glucose monitor (Codi 3) to self monitor blood sugar at home   Since last pharmacy encounter patient's time in range has increased significantly from 40% at last office visit to 87% over the time included in the report below         Hypoglycemia frequency: 0%  Hypoglycemia awareness: Yes  "      Objective     Wt Readings from Last 3 Encounters:   11/13/24 102 kg (224 lb 9.6 oz)   07/20/24 99.6 kg (219 lb 9.3 oz)   07/17/24 99.6 kg (219 lb 9.6 oz)     Estimated body mass index is 37.97 kg/m² as calculated from the following:    Height as of 7/20/24: 1.638 m (5' 4.49\").    Weight as of 11/13/24: 102 kg (224 lb 9.6 oz).    Diabetes Pharmacotherapy:  Glyburide 5 mg 2 tablets once in the morning   Metformin 1,000 mg twice daily   Mounjaro 2.5 mg once weekly     Historical Diabetes Pharmacotherapy:   Januvia 100 mg (switched to Mounjaro)    Adverse Effects:   Notes some occasional diarrhea with the Zepbound  Did have some itching on the top of her hand and thigh after her 3rd dose but stated it went away; also had itching on her calf with the 4th dose that lasted a couple days but then it went away. She states this is not a rash, just an intense itching.     Adherence:   States she never misses doses of her medications     SECONDARY PREVENTION  Statin? No  ACE-I/ARB? No  Aspirin? No    Pertinent PMH Review:  PMH of Pancreatitis: No  PMH of Retinopathy: No  PMH of Urinary Tract Infections: Yes, does not get frequently   PMH of MTC: No    Medication Reconciliation  No changes were made to patient's medication list during encounter today     Current Outpatient Medications on File Prior to Visit   Medication Sig Dispense Refill    atenolol (Tenormin) 25 mg tablet Take 1 tablet (25 mg) by mouth 2 times a day. (Patient taking differently: Take 1 tablet (25 mg) by mouth once daily.) 180 tablet 3    alcohol swabs pads, medicated USE TO CLEANSE SKIN PRIOR TO BLOOD GLUCOSE TESTING TWICE DAILY 200 each 3    blood sugar diagnostic (OneTouch Verio test strips) strip TEST BLOOD GLUCOSE TWICE DAILY. 200 strip 3    blood-glucose sensor (FreeStyle Codi 3 Plus Sensor) device Apply 1 sensor to the back of the arm for continuous monitoring of blood sugar. Remove and apply new sensor every 15 days. 2 each 11    blood-glucose " "sensor (FreeStyle Codi 3 Sensor) device Apply 1 sensor to the back of the arm for continuous glucose monitoring. Remove and apply new sensor every 14 days 6 each 3    clobetasoL-emollient 0.05 % cream APPY AND GENTLY MASSAGE INTO AFFECTED AREA(S) TWICE DAILY AS NEEDED FOR ITCHING 180 g 3    cyanocobalamin (Vitamin B-12) 1,000 mcg/mL injection Inject 1 mL (1,000 mcg) into the muscle every 30 (thirty) days. 3 mL 3    ergocalciferol (Vitamin D-2) 1.25 MG (12890 UT) capsule Take 1 capsule (1,250 mcg) by mouth 1 (one) time per week. 4 capsule 3    estradiol (Estrace) 0.01 % (0.1 mg/gram) vaginal cream APPLY 0.5 GRAMS VAGINALLY WEEKLY AS DIRECTED 127.5 g 3    fluticasone (Flonase) 50 mcg/actuation nasal spray Administer 2 sprays into each nostril once daily as needed for rhinitis or allergies. 48 g 3    lancets (OneTouch Delica Plus Lancet) 33 gauge misc USE TO TEST TWICE DAILY. 200 each 3    levothyroxine (Synthroid, Levoxyl) 50 mcg tablet Take 1 tablet (50 mcg) by mouth once daily. 90 tablet 3    lisinopril 20 mg tablet Take 1 tablet (20 mg) by mouth once daily. 90 tablet 3    meclizine (Antivert) 12.5 mg tablet TAKE 1-2 TABLETS BY MOUTH EVERY 4-6 HOURS PRN FOR DIZZINESS. CAN CAUSE DROWSINESS. 30 tablet 3    metFORMIN (Glucophage) 500 mg tablet Take 2 tablets (1,000 mg) by mouth 2 times a day. 360 tablet 3    nystatin-triamcinolone (Mycolog II) cream Apply 1 Application topically 3 times a day as needed. 180 g 3    OneTouch Verio Meter misc test as directed      syringe with needle (BD Luer-Adrian Syringe) 3 mL 25 gauge x 1\" syringe USE FOR VITAMIN B12 INJECTIONS ONCE A MONTH 3 each 3    triamterene-hydrochlorothiazid (Maxzide-25) 37.5-25 mg tablet Take 1 tablet by mouth once daily. Take with food. 90 tablet 3    [DISCONTINUED] glyBURIDE (Diabeta) 5 mg tablet Take 2 tablets (10 mg) by mouth 2 times a day. 360 tablet 3    [DISCONTINUED] tirzepatide (Mounjaro) 2.5 mg/0.5 mL pen injector Inject 2.5 mg under the skin 1 (one) " time per week. 2 mL 0     No current facility-administered medications on file prior to visit.      Lab Review  Lab Results   Component Value Date    BILITOT 0.5 08/10/2024    CALCIUM 10.9 (H) 08/10/2024    CO2 29 08/10/2024     08/10/2024    CREATININE 0.79 08/10/2024    GLUCOSE 152 (H) 08/10/2024    ALKPHOS 60 08/10/2024    K 4.2 08/10/2024    PROT 6.3 (L) 08/10/2024     08/10/2024    AST 15 08/10/2024    ALT 19 08/10/2024    BUN 18 08/10/2024    ANIONGAP 13 08/10/2024    MG 1.70 04/20/2024    PHOS 3.0 01/30/2021    ALBUMIN 3.8 08/10/2024    AMYLASE 86 12/02/2020    LIPASE 122 (H) 12/02/2020    GFRF 64 08/24/2023    GFRMALE CANCELED 08/24/2023     Lab Results   Component Value Date    TRIG 347 (H) 04/20/2024    CHOL 235 (H) 04/20/2024    LDLCALC 127 (H) 04/20/2024    HDL 38.7 04/20/2024     Lab Results   Component Value Date    HGBA1C 7.6 (H) 08/10/2024    HGBA1C 7.9 (H) 04/20/2024    HGBA1C 6.7 (H) 12/02/2023     The 10-year ASCVD risk score (Duane ZHOU, et al., 2019) is: 19.9%    Values used to calculate the score:      Age: 64 years      Sex: Female      Is Non- : No      Diabetic: Yes      Tobacco smoker: No      Systolic Blood Pressure: 138 mmHg      Is BP treated: Yes      HDL Cholesterol: 38.7 mg/dL      Total Cholesterol: 235 mg/dL    Health Maintenance:   Foot Exam: None  Eye Exam: ~9 months ago   Lipid Panel:  mg/dL,  mg/dL 04/20/24  Urine Albumin: Unable to calculate due to albumin <7 04/20/24  Influenza Vaccine: 10/05/24  Pneumonia Vaccine: PPSV23 07/09/13, PCV20 11/13/24    Drug Interactions:  No significant drug-drug interactions exist requiring adjustment to medication therapy     Patient Reported Home Weights:   12/06/24: 209 lbs     Assessment/Plan   Problem List Items Addressed This Visit       Primary hypertension    Relevant Orders    Referral to Clinical Pharmacy    Type 2 diabetes mellitus without complication, without long-term current use of  insulin (Multi) - Primary     Patient's goal A1c is < 7%.  Is pt at goal? No, most recent A1c was 7.6% on 08/10/24 which had decreased slightly from 7.9% on 04/20/24.  Patient's time in range on her Codi has improved to 86% over the past 4 weeks.   Goal time in range is >70%    Rationale for plan:   Patient has given 4 doses of Mounjaro so far. Has had some occasional diarrhea but has been manageable.   She does report some itching a couple days after her 3rd and 4th dose - no rash just itching on the skin. This improved after ~2 days.  Patient reduced her Glyburide dose down to 2 tablets in the morning before breakfast and has stopped her evening dose due to low blood sugar overnight. She will continue with just taking the morning dose.   We will continue at 2.5 mg once weekly to see if the itching improves, if it does not we may have to discuss switching to either Ozempic or Trulicity to see if she tolerates it better     Medication Changes:  CONTINUE:  Glyburide 5 mg 2 tablets by mouth once daily in the morning   Metformin 1,000 mg 1 tablet by mouth twice daily   Mounjaro 2.5 mg once weekly     Future Considerations:  If itching improves will plan to increase Mounjaro up to 5 mg once weekly   If itching does not improve or worsen will discuss switching to either Ozempic or Trulicity to see if she tolerates one of these better    Monitoring and Education:  We will review updated 4-week Codi report at next follow-up  Next A1c is due at anytime, patient states she plans to have her updated labs completed in March   We will follow-up to discuss any new/worsening side effects with Mounjaro therapy and to see if her diarrhea and itching improve.  Patient also noted today that she decreased her Atenolol dose down to 25 mg once daily instead of twice daily as she stated she had a reading in the 120s/70s and had some lightheadedness. Encouraged patient to monitor blood pressure consistently at home as reduction in beta  blocker dosage does have the potential to cause rebound hypertension acutely.      We will plan to follow-up in ~3-4 weeks to see if her side effects have improved. She was encouraged to reach out with any questions and/or concerns prior to then.          Relevant Medications    glyBURIDE (Diabeta) 5 mg tablet    tirzepatide (Mounjaro) 2.5 mg/0.5 mL pen injector    Other Relevant Orders    Referral to Clinical Pharmacy     Other Visit Diagnoses       Uncontrolled type 2 diabetes mellitus with hyperglycemia              Pharmacy Follow-Up: 12/30/2024 @ 10 AM   PCP Follow-Up: 04/02/2025    Jorge Miller, PharmD     Continue all meds under the continuation of care with the referring provider and clinical pharmacy team.

## 2024-12-06 ENCOUNTER — APPOINTMENT (OUTPATIENT)
Dept: PHARMACY | Facility: HOSPITAL | Age: 64
End: 2024-12-06
Payer: COMMERCIAL

## 2024-12-06 ENCOUNTER — PHARMACY VISIT (OUTPATIENT)
Dept: PHARMACY | Facility: CLINIC | Age: 64
End: 2024-12-06
Payer: COMMERCIAL

## 2024-12-06 DIAGNOSIS — E11.65 UNCONTROLLED TYPE 2 DIABETES MELLITUS WITH HYPERGLYCEMIA: ICD-10-CM

## 2024-12-06 DIAGNOSIS — E11.9 TYPE 2 DIABETES MELLITUS WITHOUT COMPLICATION, WITHOUT LONG-TERM CURRENT USE OF INSULIN (MULTI): Primary | ICD-10-CM

## 2024-12-06 DIAGNOSIS — I10 PRIMARY HYPERTENSION: ICD-10-CM

## 2024-12-06 PROCEDURE — RXMED WILLOW AMBULATORY MEDICATION CHARGE

## 2024-12-06 RX ORDER — TIRZEPATIDE 2.5 MG/.5ML
2.5 INJECTION, SOLUTION SUBCUTANEOUS WEEKLY
Qty: 2 ML | Refills: 0 | Status: SHIPPED | OUTPATIENT
Start: 2024-12-06

## 2024-12-06 RX ORDER — GLYBURIDE 5 MG/1
10 TABLET ORAL
Qty: 60 TABLET | Refills: 3 | Status: SHIPPED | OUTPATIENT
Start: 2024-12-06

## 2024-12-06 NOTE — ASSESSMENT & PLAN NOTE
Patient's goal A1c is < 7%.  Is pt at goal? No, most recent A1c was 7.6% on 08/10/24 which had decreased slightly from 7.9% on 04/20/24.  Patient's time in range on her Codi has improved to 86% over the past 4 weeks.   Goal time in range is >70%    Rationale for plan:   Patient has given 4 doses of Mounjaro so far. Has had some occasional diarrhea but has been manageable.   She does report some itching a couple days after her 3rd and 4th dose - no rash just itching on the skin. This improved after ~2 days.  Patient reduced her Glyburide dose down to 2 tablets in the morning before breakfast and has stopped her evening dose due to low blood sugar overnight. She will continue with just taking the morning dose.   We will continue at 2.5 mg once weekly to see if the itching improves, if it does not we may have to discuss switching to either Ozempic or Trulicity to see if she tolerates it better     Medication Changes:  CONTINUE:  Glyburide 5 mg 2 tablets by mouth once daily in the morning   Metformin 1,000 mg 1 tablet by mouth twice daily   Mounjaro 2.5 mg once weekly     Future Considerations:  If itching improves will plan to increase Mounjaro up to 5 mg once weekly   If itching does not improve or worsen will discuss switching to either Ozempic or Trulicity to see if she tolerates one of these better    Monitoring and Education:  We will review updated 4-week Codi report at next follow-up  Next A1c is due at anytime, patient states she plans to have her updated labs completed in March   We will follow-up to discuss any new/worsening side effects with Mounjaro therapy and to see if her diarrhea and itching improve.  Patient also noted today that she decreased her Atenolol dose down to 25 mg once daily instead of twice daily as she stated she had a reading in the 120s/70s and had some lightheadedness. Encouraged patient to monitor blood pressure consistently at home as reduction in beta blocker dosage does have the  potential to cause rebound hypertension acutely.      We will plan to follow-up in ~3-4 weeks to see if her side effects have improved. She was encouraged to reach out with any questions and/or concerns prior to then.

## 2024-12-17 ENCOUNTER — TELEPHONE (OUTPATIENT)
Dept: GASTROENTEROLOGY | Facility: CLINIC | Age: 64
End: 2024-12-17
Payer: COMMERCIAL

## 2024-12-17 NOTE — TELEPHONE ENCOUNTER
Dr. Kenyon put in an order for colonoscopy.  Patient stated she will call back to schedule with Dr. Phipps.

## 2024-12-30 ENCOUNTER — APPOINTMENT (OUTPATIENT)
Dept: PHARMACY | Facility: HOSPITAL | Age: 64
End: 2024-12-30
Payer: COMMERCIAL

## 2024-12-30 ENCOUNTER — PHARMACY VISIT (OUTPATIENT)
Dept: PHARMACY | Facility: CLINIC | Age: 64
End: 2024-12-30
Payer: COMMERCIAL

## 2024-12-30 DIAGNOSIS — E11.9 TYPE 2 DIABETES MELLITUS WITHOUT COMPLICATION, WITHOUT LONG-TERM CURRENT USE OF INSULIN (MULTI): ICD-10-CM

## 2024-12-30 DIAGNOSIS — I10 PRIMARY HYPERTENSION: ICD-10-CM

## 2024-12-30 PROCEDURE — RXMED WILLOW AMBULATORY MEDICATION CHARGE

## 2024-12-30 RX ORDER — TIRZEPATIDE 5 MG/.5ML
5 INJECTION, SOLUTION SUBCUTANEOUS WEEKLY
Qty: 2 ML | Refills: 0 | Status: SHIPPED | OUTPATIENT
Start: 2024-12-30

## 2024-12-30 ASSESSMENT — ENCOUNTER SYMPTOMS
DIABETIC ASSOCIATED SYMPTOMS: 0
POLYDIPSIA: 0
VISUAL CHANGE: 0

## 2024-12-30 NOTE — PROGRESS NOTES
Patient is sent at the request of Whit Kenyon MD for my opinion regarding Type 2 diabetes and hypertension.  My final recommendations will be communicated back to the requesting provider by way of shared medical record.    Subjective     Diabetes  She presents for her follow-up diabetic visit. She has type 2 diabetes mellitus. The initial diagnosis of diabetes was made 16 years ago. Her disease course has been improving. There are no hypoglycemic associated symptoms. There are no diabetic associated symptoms. Pertinent negatives for diabetes include no polydipsia, no polyuria and no visual change. (Notices more symptoms when she eats higher carbohydrates) There are no hypoglycemic complications. Symptoms are stable. There are no diabetic complications. Risk factors for coronary artery disease include diabetes mellitus, dyslipidemia, hypertension and obesity. Current diabetic treatment includes oral agent (triple therapy). She is compliant with treatment all of the time. Her weight is increasing steadily. She is following a generally healthy diet. She rarely participates in exercise. Her overall blood glucose range is >200 mg/dl. An ACE inhibitor/angiotensin II receptor blocker is being taken. She does not see a podiatrist.Eye exam is current.     At last pharmacy encounter patient reported some itching a couple days after her 3rd and 4th doses of Mounjaro. She was kept at 2.5 mg once weekly to see if this improves.     She had also increased her Glyburide down to 2 tablets in the morning before breakfast and stopped her evening dose at last follow-up due to hypoglycemic episodes overnight.     Notes improvement in her sleep apnea since starting Mounjaro.     Current diet:   Trying to eat plant based with no processed foods   Breakfast: takes some pretzels with her medications in the morning, coffee with half and half creamer, low sugar instant oatmeal at work, will also usually have a banana   Wants to try to  "eat an egg in the morning with her medications instead of pretzels  Lunch: tries to stick to salads with some protein, tries not to use bottled dressing, packs her lunch   Dinner: trying to eat salads as much as she can, will sometimes have sausage with them, kielbasa and sour kraut with mashed potatoes  Snacks: does snack on pretzels in between meals, avoids sweets, sometimes crackers with spread-able cheese  Fluids: water, hot tea  Still has not been eating fast food or out at restaurants  Since last encounter has been eating less carbs and watching what she eats   Has been eating more fresh fruits which can increase blood sugar     Current exercise:   Has 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 which limits her ability to exercise and walk throughout the day     Sodium intake:   Sometimes makes soups with canned black beans and green beans and other vegetables  Snacks on pretzels and crackers  Does not add salt to meals, will sometimes add salt to meals when cooking     Allergies   Allergen Reactions    Doxycycline Hives    Sulfamethoxazole-Trimethoprim Hives    Cephalexin Itching and Rash     Pruritus    Penicillins Headache, Unknown and Rash     rash    headache    rash   headache     Diabetes  The patient does have a known family history of diabetes (mother)     Patient is using: continuous glucose monitor (Codi 3) to self monitor blood sugar at home   Since last pharmacy encounter patient's time in range has remained fairly stable at 86%        Hypoglycemia frequency: 4% - states she double checks low readings with her glucometer, states it usually is accurate   Hypoglycemia awareness: Yes       Objective     Wt Readings from Last 3 Encounters:   11/13/24 102 kg (224 lb 9.6 oz)   07/20/24 99.6 kg (219 lb 9.3 oz)   07/17/24 99.6 kg (219 lb 9.6 oz)     Estimated body mass index is 37.97 kg/m² as calculated from the following:    Height as of 7/20/24: 1.638 m (5' 4.49\").    Weight as of 11/13/24: 102 kg (224 lb 9.6 " oz).    Patient Reported Home Weights:   12/06/24: 209 lbs   12/30/24: 211 lbs      Diabetes Pharmacotherapy:  Glyburide 5 mg 2 tablets once in the morning and 1 tablet in the evening   Metformin 1,000 mg twice daily   Mounjaro 2.5 mg once weekly   Has 1 pen left at home   Gives dose on Tuesday evenings     Historical Diabetes Pharmacotherapy:   Januvia 100 mg (switched to Mounjaro)    Adverse Effects:   Does note improvement with itchiness over the past month, states this has resolved  No new side effects reported    Notes still having some occasional diarrhea with the Mounjaro but also states it could be from the metformin as she had diarrhea with it prior to starting Mounjaro     Adherence:   States she never misses doses of her medications     SECONDARY PREVENTION  Statin? No  ACE-I/ARB? No  Aspirin? No    Pertinent PMH Review:  PMH of Pancreatitis: No  PMH of Retinopathy: No  PMH of Urinary Tract Infections: Yes, does not get frequently   PMH of MTC: No    Medication Reconciliation  No changes were made to patient's medication list during encounter today     Current Outpatient Medications on File Prior to Visit   Medication Sig Dispense Refill    alcohol swabs pads, medicated USE TO CLEANSE SKIN PRIOR TO BLOOD GLUCOSE TESTING TWICE DAILY 200 each 3    atenolol (Tenormin) 25 mg tablet Take 1 tablet (25 mg) by mouth 2 times a day. (Patient taking differently: Take 1 tablet (25 mg) by mouth once daily.) 180 tablet 3    blood sugar diagnostic (OneTouch Verio test strips) strip TEST BLOOD GLUCOSE TWICE DAILY. 200 strip 3    blood-glucose sensor (FreeStyle Codi 3 Plus Sensor) device Apply 1 sensor to the back of the arm for continuous monitoring of blood sugar. Remove and apply new sensor every 15 days. 2 each 11    blood-glucose sensor (FreeStyle Codi 3 Sensor) device Apply 1 sensor to the back of the arm for continuous glucose monitoring. Remove and apply new sensor every 14 days 6 each 3    clobetasoL-emollient  "0.05 % cream APPY AND GENTLY MASSAGE INTO AFFECTED AREA(S) TWICE DAILY AS NEEDED FOR ITCHING 180 g 3    cyanocobalamin (Vitamin B-12) 1,000 mcg/mL injection Inject 1 mL (1,000 mcg) into the muscle every 30 (thirty) days. 3 mL 3    ergocalciferol (Vitamin D-2) 1.25 MG (68836 UT) capsule Take 1 capsule (1,250 mcg) by mouth 1 (one) time per week. 4 capsule 3    estradiol (Estrace) 0.01 % (0.1 mg/gram) vaginal cream APPLY 0.5 GRAMS VAGINALLY WEEKLY AS DIRECTED 127.5 g 3    fluticasone (Flonase) 50 mcg/actuation nasal spray Administer 2 sprays into each nostril once daily as needed for rhinitis or allergies. 48 g 3    glyBURIDE (Diabeta) 5 mg tablet Take 2 tablets (10 mg) by mouth once daily with breakfast. 60 tablet 3    lancets (OneTouch Delica Plus Lancet) 33 gauge misc USE TO TEST TWICE DAILY. 200 each 3    levothyroxine (Synthroid, Levoxyl) 50 mcg tablet Take 1 tablet (50 mcg) by mouth once daily. 90 tablet 3    lisinopril 20 mg tablet Take 1 tablet (20 mg) by mouth once daily. 90 tablet 3    meclizine (Antivert) 12.5 mg tablet TAKE 1-2 TABLETS BY MOUTH EVERY 4-6 HOURS PRN FOR DIZZINESS. CAN CAUSE DROWSINESS. 30 tablet 3    metFORMIN (Glucophage) 500 mg tablet Take 2 tablets (1,000 mg) by mouth 2 times a day. 360 tablet 3    nystatin-triamcinolone (Mycolog II) cream Apply 1 Application topically 3 times a day as needed. 180 g 3    OneTouch Verio Meter misc test as directed      syringe with needle (BD Luer-Adrian Syringe) 3 mL 25 gauge x 1\" syringe USE FOR VITAMIN B12 INJECTIONS ONCE A MONTH 3 each 3    triamterene-hydrochlorothiazid (Maxzide-25) 37.5-25 mg tablet Take 1 tablet by mouth once daily. Take with food. 90 tablet 3    [DISCONTINUED] tirzepatide (Mounjaro) 2.5 mg/0.5 mL pen injector Inject 2.5 mg under the skin 1 (one) time per week. 2 mL 0     No current facility-administered medications on file prior to visit.      Lab Review  Lab Results   Component Value Date    BILITOT 0.5 08/10/2024    CALCIUM 10.9 (H) " 08/10/2024    CO2 29 08/10/2024     08/10/2024    CREATININE 0.79 08/10/2024    GLUCOSE 152 (H) 08/10/2024    ALKPHOS 60 08/10/2024    K 4.2 08/10/2024    PROT 6.3 (L) 08/10/2024     08/10/2024    AST 15 08/10/2024    ALT 19 08/10/2024    BUN 18 08/10/2024    ANIONGAP 13 08/10/2024    MG 1.70 04/20/2024    PHOS 3.0 01/30/2021    ALBUMIN 3.8 08/10/2024    AMYLASE 86 12/02/2020    LIPASE 122 (H) 12/02/2020    GFRF 64 08/24/2023    GFRMALE CANCELED 08/24/2023     Lab Results   Component Value Date    TRIG 347 (H) 04/20/2024    CHOL 235 (H) 04/20/2024    LDLCALC 127 (H) 04/20/2024    HDL 38.7 04/20/2024     Lab Results   Component Value Date    HGBA1C 7.6 (H) 08/10/2024    HGBA1C 7.9 (H) 04/20/2024    HGBA1C 6.7 (H) 12/02/2023     The 10-year ASCVD risk score (Duane ZHOU, et al., 2019) is: 19.9%    Values used to calculate the score:      Age: 64 years      Sex: Female      Is Non- : No      Diabetic: Yes      Tobacco smoker: No      Systolic Blood Pressure: 138 mmHg      Is BP treated: Yes      HDL Cholesterol: 38.7 mg/dL      Total Cholesterol: 235 mg/dL    Health Maintenance:   Foot Exam: None  Eye Exam: ~9 months ago   Lipid Panel:  mg/dL,  mg/dL 04/20/24  Urine Albumin: Unable to calculate due to albumin <7 04/20/24  Influenza Vaccine: 10/05/24  Pneumonia Vaccine: PPSV23 07/09/13, PCV20 11/13/24    Drug Interactions:  No significant drug-drug interactions exist requiring adjustment to medication therapy     Assessment/Plan   Problem List Items Addressed This Visit       Primary hypertension    Relevant Medications    tirzepatide (Mounjaro) 5 mg/0.5 mL pen injector    Other Relevant Orders    Referral to Clinical Pharmacy    Type 2 diabetes mellitus without complication, without long-term current use of insulin (Multi)     Patient's goal A1c is < 7%.  Is pt at goal? No, most recent A1c was 7.6% on 08/10/24 which had decreased slightly from 7.9% on 04/20/24.  Patient's  "time in range on her Codi has remained stable at 86%  Goal time in range is >70%    Rationale for plan:   Patient notes resolution of itching since continuing at Mounjaro 2.5 mg over the past month   Does note some continued occasional diarrhea but states she had this from Metformin prior to starting Mounjaro   Notes reduced \"food noise\" since continuing on the same dose and would like to titrate up  Has still had some episodes of hypoglycemia (4%), patient should do well stopping the Glyburide with titration of Mounjaro     Medication Changes:  CONTINUE:  Metformin 1,000 mg 1 tablet by mouth twice daily   STOP:   Glyburide 5 mg tablets  INCREASE:   Mounjaro 5 mg under the skin once weekly (increased from 2.5 mg)  Has 1 pen left at home for dose tomorrow  Will increase up to 5 mg effective with dose on Tuesday 01/07    Future Considerations:  If patient tolerates the dose increase will plan to titrate up further to 7.5 mg at next follow-up    Monitoring and Education:  We will review updated 4-week Codi report at next follow-up  Next A1c is due at anytime, patient states she plans to have her updated labs completed in March   We will follow-up to discuss any new/worsening side effects with Mounjaro therapy     We will plan to follow-up in 4 weeks to see how she has tolerated the increased dose of Mounjaro. She was encouraged to reach out with any questions and/or concerns prior to then if needed.          Relevant Medications    tirzepatide (Mounjaro) 5 mg/0.5 mL pen injector    Other Relevant Orders    Referral to Clinical Pharmacy     Pharmacy Follow-Up: 01/27/2025  PCP Follow-Up: 04/02/2025    Jorge Miller, PharmD     Continue all meds under the continuation of care with the referring provider and clinical pharmacy team.    "

## 2024-12-30 NOTE — ASSESSMENT & PLAN NOTE
"Patient's goal A1c is < 7%.  Is pt at goal? No, most recent A1c was 7.6% on 08/10/24 which had decreased slightly from 7.9% on 04/20/24.  Patient's time in range on her Codi has remained stable at 86%  Goal time in range is >70%    Rationale for plan:   Patient notes resolution of itching since continuing at Mounjaro 2.5 mg over the past month   Does note some continued occasional diarrhea but states she had this from Metformin prior to starting Mounjaro   Notes reduced \"food noise\" since continuing on the same dose and would like to titrate up  Has still had some episodes of hypoglycemia (4%), patient should do well stopping the Glyburide with titration of Mounjaro     Medication Changes:  CONTINUE:  Metformin 1,000 mg 1 tablet by mouth twice daily   STOP:   Glyburide 5 mg tablets  INCREASE:   Mounjaro 5 mg under the skin once weekly (increased from 2.5 mg)  Has 1 pen left at home for dose tomorrow  Will increase up to 5 mg effective with dose on Tuesday 01/07    Future Considerations:  If patient tolerates the dose increase will plan to titrate up further to 7.5 mg at next follow-up    Monitoring and Education:  We will review updated 4-week Codi report at next follow-up  Next A1c is due at anytime, patient states she plans to have her updated labs completed in March   We will follow-up to discuss any new/worsening side effects with Mounjaro therapy     We will plan to follow-up in 4 weeks to see how she has tolerated the increased dose of Mounjaro. She was encouraged to reach out with any questions and/or concerns prior to then if needed.   "

## 2025-01-27 ENCOUNTER — APPOINTMENT (OUTPATIENT)
Dept: PHARMACY | Facility: HOSPITAL | Age: 65
End: 2025-01-27
Payer: COMMERCIAL

## 2025-01-27 ENCOUNTER — PHARMACY VISIT (OUTPATIENT)
Dept: PHARMACY | Facility: CLINIC | Age: 65
End: 2025-01-27
Payer: COMMERCIAL

## 2025-01-27 DIAGNOSIS — E11.9 TYPE 2 DIABETES MELLITUS WITHOUT COMPLICATION, WITHOUT LONG-TERM CURRENT USE OF INSULIN (MULTI): Primary | ICD-10-CM

## 2025-01-27 DIAGNOSIS — I10 PRIMARY HYPERTENSION: ICD-10-CM

## 2025-01-27 PROCEDURE — RXMED WILLOW AMBULATORY MEDICATION CHARGE

## 2025-01-27 RX ORDER — TIRZEPATIDE 7.5 MG/.5ML
7.5 INJECTION, SOLUTION SUBCUTANEOUS WEEKLY
Qty: 2 ML | Refills: 0 | Status: SHIPPED | OUTPATIENT
Start: 2025-01-27

## 2025-01-27 RX ORDER — METFORMIN HYDROCHLORIDE 500 MG/1
500 TABLET ORAL
Qty: 180 TABLET | Refills: 3 | Status: SHIPPED | OUTPATIENT
Start: 2025-01-27

## 2025-01-27 ASSESSMENT — ENCOUNTER SYMPTOMS
DIABETIC ASSOCIATED SYMPTOMS: 0
VISUAL CHANGE: 0
POLYDIPSIA: 0

## 2025-01-27 NOTE — ASSESSMENT & PLAN NOTE
Patient's goal A1c is < 7%.  Is pt at goal? No, most recent A1c was 7.6% on 08/10/24 which had decreased slightly from 7.9% on 04/20/24.  Patient's time in range on her Codi has decreased from 86% to 72% over the past 4 weeks, however, is still at goal of >70%.     Rationale for plan:   Decrease in time in range was expected since discontinuing Glyburide, explained to patient that increasing Mounjaro up will help with this  She does report some slight nausea a couple days after her dose but states it has been manageable. She initially wanted to continue at 5 mg another month, but discussed with her that some slight nausea is normal and we ideally want to increase the dose up to help her blood sugars. She is agreeable to trying the next dose.   She also reports continued occasional diarrhea with Metformin, would like to try reducing the dose to see if it helps.     Medication Changes:  INCREASE:   Mounjaro 7.5 mg under the skin once weekly (increased from 5 mg)  DECREASE:   Metformin 500 mg 1 tablet by mouth twice daily (decreased from 2 tablets twice daily)    Future Considerations:  If patient tolerates the dose increase will plan to titrate up further to 10 mg at next follow-up  Could also switch Metformin to XR formulation if diarrhea does not improve upon reducing the dose     Monitoring and Education:  We will review updated 4-week Codi report at next follow-up  Next A1c is due at anytime, patient plans to have her updated labs completed in March   We will follow-up to discuss any new/worsening side effects with Mounjaro therapy and dose increase and to see if diarrhea improved at all with reduced dose of Metformin    We will plan to follow-up in 4 weeks to see how she has tolerated the increased dose of Mounjaro and dose reduction of Metformin. She was encouraged to reach out with any questions and/or concerns prior to then if needed.

## 2025-01-27 NOTE — PROGRESS NOTES
Patient is sent at the request of Whit Kenyon MD for my opinion regarding Type 2 diabetes and hypertension.  My final recommendations will be communicated back to the requesting provider by way of shared medical record.    Subjective     Diabetes  She presents for her follow-up diabetic visit. She has type 2 diabetes mellitus. The initial diagnosis of diabetes was made 16 years ago. Her disease course has been improving. There are no hypoglycemic associated symptoms. There are no diabetic associated symptoms. Pertinent negatives for diabetes include no polydipsia, no polyuria and no visual change. (Notices more symptoms when she eats higher carbohydrates) There are no hypoglycemic complications. Symptoms are stable. There are no diabetic complications. Risk factors for coronary artery disease include diabetes mellitus, dyslipidemia, hypertension and obesity. Current diabetic treatment includes oral agent (triple therapy). She is compliant with treatment all of the time. Her weight is increasing steadily. She is following a generally healthy diet. She rarely participates in exercise. Her overall blood glucose range is >200 mg/dl. An ACE inhibitor/angiotensin II receptor blocker is being taken. She does not see a podiatrist.Eye exam is current.     At last pharmacy encounter patient noted resolution of her itchiness but was having some episodes of hypoglycemia. Her Glyburide was discontinued and her dose of Mounjaro was titrated up to 5 mg once weekly. We are following-up today to see how she has tolerated the dose increase.     Current diet:   Trying to eat plant based with no processed foods   Breakfast: takes some pretzels with her medications in the morning, coffee with half and half creamer, low sugar instant oatmeal at work, will also usually have a banana   Wants to try to eat an egg in the morning with her medications instead of pretzels  Lunch: tries to stick to salads with some protein, tries not to  "use bottled dressing, packs her lunch   Dinner: trying to eat salads as much as she can, will sometimes have sausage with them, kielbasa and sour kraut with mashed potatoes  Snacks: does snack on pretzels in between meals, avoids sweets, sometimes crackers with spread-able cheese  Fluids: water, hot tea  Still has not been eating fast food or out at restaurants  Since last encounter has been eating less carbs and watching what she eats   Has been eating more fresh fruits which can increase blood sugar     Current exercise:   Has 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 which limits her ability to exercise and walk throughout the day     Sodium intake:   Sometimes makes soups with canned black beans and green beans and other vegetables  Snacks on pretzels and crackers  Does not add salt to meals, will sometimes add salt to meals when cooking     Allergies   Allergen Reactions    Doxycycline Hives    Sulfamethoxazole-Trimethoprim Hives    Cephalexin Itching and Rash     Pruritus    Penicillins Headache, Unknown and Rash     rash    headache    rash   headache     Diabetes  The patient does have a known family history of diabetes (mother)     Patient is using: continuous glucose monitor (Codi 3) to self monitor blood sugar at home   Time in range decreased from 86% to 72%, however, this is still at goal           Hypoglycemia frequency: 0% - improved from 4% at last pharmacy encounter    Hypoglycemia awareness: Yes       Objective     Wt Readings from Last 3 Encounters:   11/13/24 102 kg (224 lb 9.6 oz)   07/20/24 99.6 kg (219 lb 9.3 oz)   07/17/24 99.6 kg (219 lb 9.6 oz)     Estimated body mass index is 37.97 kg/m² as calculated from the following:    Height as of 7/20/24: 1.638 m (5' 4.49\").    Weight as of 11/13/24: 102 kg (224 lb 9.6 oz).    Patient Reported Home Weights:   12/06/24: 209 lbs   12/30/24: 211 lbs    01/27/25: 202 lbs     Diabetes Pharmacotherapy:  Metformin 500 mg 2 tablets twice daily   Mounjaro 5 mg once " weekly (Fridays)  Has 0 pens left at home    Historical Diabetes Pharmacotherapy:   Januvia 100 mg (switched to Mounjaro)  Glyburide 5 mg (stopped due to hypoglycemia)     Adverse Effects:   Notes some slight nausea a couple days after her dose but has been manageable   Notes still having some occasional diarrhea with the Mounjaro but also states it could be from the metformin as she had diarrhea with it prior to starting Mounjaro     Adherence:   States she never misses doses of her medications     SECONDARY PREVENTION  Statin? No  ACE-I/ARB? No  Aspirin? No    Pertinent PMH Review:  PMH of Pancreatitis: No  PMH of Retinopathy: No  PMH of Urinary Tract Infections: Yes, does not get frequently   PMH of MTC: No    Medication Reconciliation  No changes were made to patient's medication list during encounter today     Current Outpatient Medications on File Prior to Visit   Medication Sig Dispense Refill    alcohol swabs pads, medicated USE TO CLEANSE SKIN PRIOR TO BLOOD GLUCOSE TESTING TWICE DAILY 200 each 3    atenolol (Tenormin) 25 mg tablet Take 1 tablet (25 mg) by mouth 2 times a day. (Patient taking differently: Take 1 tablet (25 mg) by mouth once daily.) 180 tablet 3    blood sugar diagnostic (OneTouch Verio test strips) strip TEST BLOOD GLUCOSE TWICE DAILY. 200 strip 3    blood-glucose sensor (FreeStyle Codi 3 Plus Sensor) device Apply 1 sensor to the back of the arm for continuous monitoring of blood sugar. Remove and apply new sensor every 15 days. 2 each 11    blood-glucose sensor (FreeStyle Codi 3 Sensor) device Apply 1 sensor to the back of the arm for continuous glucose monitoring. Remove and apply new sensor every 14 days 6 each 3    clobetasoL-emollient 0.05 % cream APPY AND GENTLY MASSAGE INTO AFFECTED AREA(S) TWICE DAILY AS NEEDED FOR ITCHING 180 g 3    cyanocobalamin (Vitamin B-12) 1,000 mcg/mL injection Inject 1 mL (1,000 mcg) into the muscle every 30 (thirty) days. 3 mL 3    ergocalciferol  "(Vitamin D-2) 1.25 MG (78746 UT) capsule Take 1 capsule (1,250 mcg) by mouth 1 (one) time per week. 4 capsule 3    estradiol (Estrace) 0.01 % (0.1 mg/gram) vaginal cream APPLY 0.5 GRAMS VAGINALLY WEEKLY AS DIRECTED 127.5 g 3    fluticasone (Flonase) 50 mcg/actuation nasal spray Administer 2 sprays into each nostril once daily as needed for rhinitis or allergies. 48 g 3    glyBURIDE (Diabeta) 5 mg tablet Take 2 tablets (10 mg) by mouth once daily with breakfast. 60 tablet 3    lancets (OneTouch Delica Plus Lancet) 33 gauge misc USE TO TEST TWICE DAILY. 200 each 3    levothyroxine (Synthroid, Levoxyl) 50 mcg tablet Take 1 tablet (50 mcg) by mouth once daily. 90 tablet 3    lisinopril 20 mg tablet Take 1 tablet (20 mg) by mouth once daily. 90 tablet 3    meclizine (Antivert) 12.5 mg tablet TAKE 1-2 TABLETS BY MOUTH EVERY 4-6 HOURS PRN FOR DIZZINESS. CAN CAUSE DROWSINESS. 30 tablet 3    nystatin-triamcinolone (Mycolog II) cream Apply 1 Application topically 3 times a day as needed. 180 g 3    OneTouch Verio Meter misc test as directed      syringe with needle (BD Luer-Adrian Syringe) 3 mL 25 gauge x 1\" syringe USE FOR VITAMIN B12 INJECTIONS ONCE A MONTH 3 each 3    triamterene-hydrochlorothiazid (Maxzide-25) 37.5-25 mg tablet Take 1 tablet by mouth once daily. Take with food. 90 tablet 3    [DISCONTINUED] metFORMIN (Glucophage) 500 mg tablet Take 2 tablets (1,000 mg) by mouth 2 times a day. 360 tablet 3    [DISCONTINUED] tirzepatide (Mounjaro) 5 mg/0.5 mL pen injector Inject 5 mg under the skin 1 (one) time per week. 2 mL 0     No current facility-administered medications on file prior to visit.      Lab Review  Lab Results   Component Value Date    BILITOT 0.5 08/10/2024    CALCIUM 10.9 (H) 08/10/2024    CO2 29 08/10/2024     08/10/2024    CREATININE 0.79 08/10/2024    GLUCOSE 152 (H) 08/10/2024    ALKPHOS 60 08/10/2024    K 4.2 08/10/2024    PROT 6.3 (L) 08/10/2024     08/10/2024    AST 15 08/10/2024    ALT " 19 08/10/2024    BUN 18 08/10/2024    ANIONGAP 13 08/10/2024    MG 1.70 04/20/2024    PHOS 3.0 01/30/2021    ALBUMIN 3.8 08/10/2024    AMYLASE 86 12/02/2020    LIPASE 122 (H) 12/02/2020    GFRF 64 08/24/2023    GFRMALE CANCELED 08/24/2023     Lab Results   Component Value Date    TRIG 347 (H) 04/20/2024    CHOL 235 (H) 04/20/2024    LDLCALC 127 (H) 04/20/2024    HDL 38.7 04/20/2024     Lab Results   Component Value Date    HGBA1C 7.6 (H) 08/10/2024    HGBA1C 7.9 (H) 04/20/2024    HGBA1C 6.7 (H) 12/02/2023     The 10-year ASCVD risk score (Duane ZHOU, et al., 2019) is: 19.9%    Values used to calculate the score:      Age: 64 years      Sex: Female      Is Non- : No      Diabetic: Yes      Tobacco smoker: No      Systolic Blood Pressure: 138 mmHg      Is BP treated: Yes      HDL Cholesterol: 38.7 mg/dL      Total Cholesterol: 235 mg/dL    Health Maintenance:   Foot Exam: None  Eye Exam: ~10 months ago   Lipid Panel:  mg/dL,  mg/dL 04/20/24  Urine Albumin: Unable to calculate due to albumin <7 04/20/24  Influenza Vaccine: 10/05/24  Pneumonia Vaccine: PPSV23 07/09/13, PCV20 11/13/24    Drug Interactions:  No significant drug-drug interactions exist requiring adjustment to medication therapy       Assessment/Plan   Problem List Items Addressed This Visit       Primary hypertension    Relevant Orders    Referral to Clinical Pharmacy    Type 2 diabetes mellitus without complication, without long-term current use of insulin (Multi) - Primary     Patient's goal A1c is < 7%.  Is pt at goal? No, most recent A1c was 7.6% on 08/10/24 which had decreased slightly from 7.9% on 04/20/24.  Patient's time in range on her Codi has decreased from 86% to 72% over the past 4 weeks, however, is still at goal of >70%.     Rationale for plan:   Decrease in time in range was expected since discontinuing Glyburide, explained to patient that increasing Mounjaro up will help with this  She does report some  slight nausea a couple days after her dose but states it has been manageable. She initially wanted to continue at 5 mg another month, but discussed with her that some slight nausea is normal and we ideally want to increase the dose up to help her blood sugars. She is agreeable to trying the next dose.   She also reports continued occasional diarrhea with Metformin, would like to try reducing the dose to see if it helps.     Medication Changes:  INCREASE:   Mounjaro 7.5 mg under the skin once weekly (increased from 5 mg)  DECREASE:   Metformin 500 mg 1 tablet by mouth twice daily (decreased from 2 tablets twice daily)    Future Considerations:  If patient tolerates the dose increase will plan to titrate up further to 10 mg at next follow-up  Could also switch Metformin to XR formulation if diarrhea does not improve upon reducing the dose     Monitoring and Education:  We will review updated 4-week Codi report at next follow-up  Next A1c is due at anytime, patient plans to have her updated labs completed in March   We will follow-up to discuss any new/worsening side effects with Mounjaro therapy and dose increase and to see if diarrhea improved at all with reduced dose of Metformin    We will plan to follow-up in 4 weeks to see how she has tolerated the increased dose of Mounjaro and dose reduction of Metformin. She was encouraged to reach out with any questions and/or concerns prior to then if needed.          Relevant Medications    tirzepatide (Mounjaro) 7.5 mg/0.5 mL pen injector    metFORMIN (Glucophage) 500 mg tablet    Other Relevant Orders    Referral to Clinical Pharmacy     Pharmacy Follow-Up: 02/17/2025   PCP Follow-Up: 04/02/2025    Jorge Miller PharmD     Continue all meds under the continuation of care with the referring provider and clinical pharmacy team.

## 2025-01-30 DIAGNOSIS — E11.69 TYPE 2 DIABETES MELLITUS WITH OTHER SPECIFIED COMPLICATION, UNSPECIFIED WHETHER LONG TERM INSULIN USE (MULTI): ICD-10-CM

## 2025-01-30 RX ORDER — LANCETS 33 GAUGE
EACH MISCELLANEOUS
Qty: 200 EACH | Refills: 3 | Status: SHIPPED | OUTPATIENT
Start: 2025-01-30

## 2025-02-14 ASSESSMENT — ENCOUNTER SYMPTOMS
POLYDIPSIA: 0
DIABETIC ASSOCIATED SYMPTOMS: 0
VISUAL CHANGE: 0

## 2025-02-14 NOTE — PROGRESS NOTES
Patient is sent at the request of Whit Kenyon MD for my opinion regarding Type 2 diabetes and hypertension.  My final recommendations will be communicated back to the requesting provider by way of shared medical record.    Subjective     Diabetes  She presents for her follow-up diabetic visit. She has type 2 diabetes mellitus. The initial diagnosis of diabetes was made 16 years ago. Her disease course has been improving. There are no hypoglycemic associated symptoms. There are no diabetic associated symptoms. Pertinent negatives for diabetes include no polydipsia, no polyuria and no visual change. (Notices more symptoms when she eats higher carbohydrates) There are no hypoglycemic complications. Symptoms are stable. There are no diabetic complications. Risk factors for coronary artery disease include diabetes mellitus, dyslipidemia, hypertension and obesity. Current diabetic treatment includes oral agent (monotherapy) (Mounjaro once weekly). She is compliant with treatment all of the time. Her weight is increasing steadily. She is following a generally healthy diet. She rarely participates in exercise. Her overall blood glucose range is >200 mg/dl. An ACE inhibitor/angiotensin II receptor blocker is being taken. She does not see a podiatrist.Eye exam is current.     At last pharmacy encounter patient was generally tolerating Mounjaro well, reporting just some slight nausea so her dose was increased up to 7.5 mg. She also noted continued occasional diarrhea with Metformin so her dose was reduced down to 1 tablet twice daily from 2 tablets twice daily. We are following-up today to see how she has tolerated the medication changes and go over her updated Codi report.     Current diet:   Since increasing Mounjaro up to 7.5 mg states that her appetite is drastically reduced, was not able to drink as much water as well   Trying to eat plant based with no processed foods   Breakfast: takes some pretzels with her  "medications in the morning, coffee with half and half creamer, low sugar instant oatmeal at work, will also usually have a banana   Wants to try to eat an egg in the morning with her medications instead of pretzels  Lunch: tries to stick to salads with some protein, tries not to use bottled dressing, packs her lunch   Dinner: trying to eat salads as much as she can, will sometimes have sausage with them, kielbasa and sour kraut with mashed potatoes  Snacks: does snack on pretzels in between meals, avoids sweets, sometimes crackers with spread-able cheese  Fluids: water, hot tea  Still has not been eating fast food or out at restaurants  Since last encounter has been eating less carbs and watching what she eats   Has been eating more fresh fruits which can increase blood sugar     Current exercise:   Has 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 which limits her ability to exercise and walk throughout the day     Sodium intake:   Sometimes makes soups with canned black beans and green beans and other vegetables  Snacks on pretzels and crackers  Does not add salt to meals, will sometimes add salt to meals when cooking     Allergies   Allergen Reactions    Doxycycline Hives    Sulfamethoxazole-Trimethoprim Hives    Cephalexin Itching and Rash     Pruritus    Penicillins Headache, Unknown and Rash     rash    headache    rash   headache     Diabetes  The patient does have a known family history of diabetes (mother)     Patient is using: continuous glucose monitor (Codi 3 Plus) to self monitor blood sugar at home   Time in range decreased from 72% down to 45%          Hypoglycemia frequency: 0%   Hypoglycemia awareness: Yes       Objective     Wt Readings from Last 3 Encounters:   11/13/24 102 kg (224 lb 9.6 oz)   07/20/24 99.6 kg (219 lb 9.3 oz)   07/17/24 99.6 kg (219 lb 9.6 oz)     Estimated body mass index is 37.97 kg/m² as calculated from the following:    Height as of 7/20/24: 1.638 m (5' 4.49\").    Weight as of 11/13/24: 102 " kg (224 lb 9.6 oz).    Patient Reported Home Weights:   12/06/24: 209 lbs   12/30/24: 211 lbs    01/27/25: 202 lbs   02/17/25: 194 lbs     Diabetes Pharmacotherapy:  Metformin 500 mg 2 tablets twice daily   Mounjaro 7.5 mg once weekly (Fridays)    Historical Diabetes Pharmacotherapy:   Januvia 100 mg (switched to Mounjaro)  Glyburide 5 mg (stopped due to hypoglycemia)     Adverse Effects:   Increased satiety leading to reduced eating  Does report improvement in diarrhea since reduced dose of Metformin  Denies nausea or other GI related side effects with Mounjaro   Does note fatigue a couple days after her dose of Mounjaro     Adherence:   States she never misses doses of her medications     SECONDARY PREVENTION  Statin? No  ACE-I/ARB? No  Aspirin? No    Pertinent PMH Review:  PMH of Pancreatitis: No  PMH of Retinopathy: No  PMH of Urinary Tract Infections: Yes, does not get frequently   PMH of MTC: No    Medication Reconciliation  No changes were made to patient's medication list during encounter today     Current Outpatient Medications on File Prior to Visit   Medication Sig Dispense Refill    alcohol swabs pads, medicated USE TO CLEANSE SKIN PRIOR TO BLOOD GLUCOSE TESTING TWICE DAILY 200 each 3    atenolol (Tenormin) 25 mg tablet Take 1 tablet (25 mg) by mouth 2 times a day. (Patient taking differently: Take 1 tablet (25 mg) by mouth once daily.) 180 tablet 3    blood sugar diagnostic (OneTouch Verio test strips) strip TEST BLOOD GLUCOSE TWICE DAILY. 200 strip 3    blood-glucose sensor (FreeStyle Codi 3 Plus Sensor) device Apply 1 sensor to the back of the arm for continuous monitoring of blood sugar. Remove and apply new sensor every 15 days. 2 each 11    blood-glucose sensor (FreeStyle Codi 3 Sensor) device Apply 1 sensor to the back of the arm for continuous glucose monitoring. Remove and apply new sensor every 14 days 6 each 3    clobetasoL-emollient 0.05 % cream APPY AND GENTLY MASSAGE INTO AFFECTED AREA(S)  "TWICE DAILY AS NEEDED FOR ITCHING 180 g 3    cyanocobalamin (Vitamin B-12) 1,000 mcg/mL injection Inject 1 mL (1,000 mcg) into the muscle every 30 (thirty) days. 3 mL 3    ergocalciferol (Vitamin D-2) 1.25 MG (85867 UT) capsule Take 1 capsule (1,250 mcg) by mouth 1 (one) time per week. 4 capsule 3    estradiol (Estrace) 0.01 % (0.1 mg/gram) vaginal cream APPLY 0.5 GRAMS VAGINALLY WEEKLY AS DIRECTED 127.5 g 3    fluticasone (Flonase) 50 mcg/actuation nasal spray Administer 2 sprays into each nostril once daily as needed for rhinitis or allergies. 48 g 3    lancets (OneTouch Delica Plus Lancet) 33 gauge misc USE TO TEST TWICE DAILY. 200 each 3    levothyroxine (Synthroid, Levoxyl) 50 mcg tablet Take 1 tablet (50 mcg) by mouth once daily. 90 tablet 3    lisinopril 20 mg tablet Take 1 tablet (20 mg) by mouth once daily. 90 tablet 3    meclizine (Antivert) 12.5 mg tablet TAKE 1-2 TABLETS BY MOUTH EVERY 4-6 HOURS PRN FOR DIZZINESS. CAN CAUSE DROWSINESS. 30 tablet 3    nystatin-triamcinolone (Mycolog II) cream Apply 1 Application topically 3 times a day as needed. 180 g 3    OneTouch Verio Meter misc test as directed      syringe with needle (BD Luer-Adrian Syringe) 3 mL 25 gauge x 1\" syringe USE FOR VITAMIN B12 INJECTIONS ONCE A MONTH 3 each 3    triamterene-hydrochlorothiazid (Maxzide-25) 37.5-25 mg tablet Take 1 tablet by mouth once daily. Take with food. 90 tablet 3    [DISCONTINUED] glyBURIDE (Diabeta) 5 mg tablet Take 2 tablets (10 mg) by mouth once daily with breakfast. 60 tablet 3    [DISCONTINUED] metFORMIN (Glucophage) 500 mg tablet Take 1 tablet (500 mg) by mouth 2 times daily (morning and late afternoon). 180 tablet 3    [DISCONTINUED] tirzepatide (Mounjaro) 7.5 mg/0.5 mL pen injector Inject 7.5 mg under the skin 1 (one) time per week. 2 mL 0     No current facility-administered medications on file prior to visit.      Lab Review  Lab Results   Component Value Date    BILITOT 0.5 08/10/2024    CALCIUM 10.9 (H) " 08/10/2024    CO2 29 08/10/2024     08/10/2024    CREATININE 0.79 08/10/2024    GLUCOSE 152 (H) 08/10/2024    ALKPHOS 60 08/10/2024    K 4.2 08/10/2024    PROT 6.3 (L) 08/10/2024     08/10/2024    AST 15 08/10/2024    ALT 19 08/10/2024    BUN 18 08/10/2024    ANIONGAP 13 08/10/2024    MG 1.70 04/20/2024    PHOS 3.0 01/30/2021    ALBUMIN 3.8 08/10/2024    AMYLASE 86 12/02/2020    LIPASE 122 (H) 12/02/2020    GFRF 64 08/24/2023    GFRMALE CANCELED 08/24/2023     Lab Results   Component Value Date    TRIG 347 (H) 04/20/2024    CHOL 235 (H) 04/20/2024    LDLCALC 127 (H) 04/20/2024    HDL 38.7 04/20/2024     Lab Results   Component Value Date    HGBA1C 7.6 (H) 08/10/2024    HGBA1C 7.9 (H) 04/20/2024    HGBA1C 6.7 (H) 12/02/2023     The 10-year ASCVD risk score (Duane ZHOU, et al., 2019) is: 19.9%    Values used to calculate the score:      Age: 64 years      Sex: Female      Is Non- : No      Diabetic: Yes      Tobacco smoker: No      Systolic Blood Pressure: 138 mmHg      Is BP treated: Yes      HDL Cholesterol: 38.7 mg/dL      Total Cholesterol: 235 mg/dL    Health Maintenance:   Foot Exam: None  Eye Exam: ~10 months ago   Lipid Panel:  mg/dL,  mg/dL 04/20/24  Urine Albumin: Unable to calculate due to albumin <7 04/20/24  Influenza Vaccine: 10/05/24  Pneumonia Vaccine: PPSV23 07/09/13, PCV20 11/13/24    Drug Interactions:  No significant drug-drug interactions exist requiring adjustment to medication therapy       Assessment/Plan   Problem List Items Addressed This Visit       Primary hypertension    Type 2 diabetes mellitus without complication, without long-term current use of insulin (Multi) - Primary     Patient's goal A1c is < 7%.  Is pt at goal? No, most recent A1c was 7.6% on 08/10/24 which had decreased slightly from 7.9% on 04/20/24.  Patient's time in range on her Codi has decreased from 72% to 45% over the past 4 weeks, and is no longer at goal of >70%      Rationale for plan:   While we did reduce her dose of Metformin down to 1 tablet twice daily from 2 tablets twice daily, I would not have expected the worsening blood sugars since her Mounjaro was titrated up to 7.5 mg once weekly  Diarrhea has improved since reducing Metformin down and denies GI related side effects with Mounjaro  Does report increased satiety with the increased dose of Mounjaro but notes that she is not eating as much protein as she used to and also notes fatigue a couple days after her dose  Patient wanted to try taking 2 tablets of Metformin in the evening to see if she tolerated it ok as she previously only had diarrhea after her morning dose  She is also agreeable continuing on the 7.5 mg dose of Mounjaro to see if her satiety improves, due to her elevated blood sugars I don't want to reduce the dose and have them increase further     Medication Changes:  CONTINUE:   Mounjaro 7.5 mg under the skin once weekly  INCREASE:   Metformin 500 mg 1 tablet by mouth in the morning and 2 tablets in the evening with meals     Future Considerations:  If patient reports improvement in satiety and eating more will likely increase Mounjaro up to 10 mg once weekly to help with glycemic control   Could also switch Metformin to XR formulation if diarrhea comes back to see if patient is able to tolerate with the XR at a higher dose     Monitoring and Education:  We will review updated 4-week Codi report at next follow-up  Next A1c is due at anytime, patient plans to have her updated labs completed on 03/01/25  We will follow-up to discuss if Mounjaro side effects have improved and to ensure she has done well with 3 tablets of Metformin daily     We will plan to follow-up in ~3 weeks to see how she has tolerated the increased dose of Metformin and if she has done better with her current dose of Mounjaro. She was encouraged to reach out with any questions and/or concerns prior to then if needed.           Relevant Medications    tirzepatide (Mounjaro) 7.5 mg/0.5 mL pen injector    metFORMIN (Glucophage) 500 mg tablet    Other Relevant Orders    Referral to Clinical Pharmacy     Pharmacy Follow-Up: 03/10/2025   PCP Follow-Up: 03/11/2025    Jorge Miller PharmD     Continue all meds under the continuation of care with the referring provider and clinical pharmacy team.

## 2025-02-17 ENCOUNTER — PHARMACY VISIT (OUTPATIENT)
Dept: PHARMACY | Facility: CLINIC | Age: 65
End: 2025-02-17
Payer: COMMERCIAL

## 2025-02-17 ENCOUNTER — APPOINTMENT (OUTPATIENT)
Dept: PHARMACY | Facility: HOSPITAL | Age: 65
End: 2025-02-17
Payer: COMMERCIAL

## 2025-02-17 DIAGNOSIS — I10 PRIMARY HYPERTENSION: ICD-10-CM

## 2025-02-17 DIAGNOSIS — E11.9 TYPE 2 DIABETES MELLITUS WITHOUT COMPLICATION, WITHOUT LONG-TERM CURRENT USE OF INSULIN (MULTI): Primary | ICD-10-CM

## 2025-02-17 PROCEDURE — RXMED WILLOW AMBULATORY MEDICATION CHARGE

## 2025-02-17 RX ORDER — TIRZEPATIDE 7.5 MG/.5ML
7.5 INJECTION, SOLUTION SUBCUTANEOUS WEEKLY
Qty: 2 ML | Refills: 0 | Status: SHIPPED | OUTPATIENT
Start: 2025-02-17

## 2025-02-17 RX ORDER — METFORMIN HYDROCHLORIDE 500 MG/1
TABLET ORAL
Qty: 180 TABLET | Refills: 3 | Status: SHIPPED | OUTPATIENT
Start: 2025-02-17

## 2025-02-17 NOTE — ASSESSMENT & PLAN NOTE
Patient's goal A1c is < 7%.  Is pt at goal? No, most recent A1c was 7.6% on 08/10/24 which had decreased slightly from 7.9% on 04/20/24.  Patient's time in range on her Codi has decreased from 72% to 45% over the past 4 weeks, and is no longer at goal of >70%     Rationale for plan:   While we did reduce her dose of Metformin down to 1 tablet twice daily from 2 tablets twice daily, I would not have expected the worsening blood sugars since her Mounjaro was titrated up to 7.5 mg once weekly  Diarrhea has improved since reducing Metformin down and denies GI related side effects with Mounjaro  Does report increased satiety with the increased dose of Mounjaro but notes that she is not eating as much protein as she used to and also notes fatigue a couple days after her dose  Patient wanted to try taking 2 tablets of Metformin in the evening to see if she tolerated it ok as she previously only had diarrhea after her morning dose  She is also agreeable continuing on the 7.5 mg dose of Mounjaro to see if her satiety improves, due to her elevated blood sugars I don't want to reduce the dose and have them increase further     Medication Changes:  CONTINUE:   Mounjaro 7.5 mg under the skin once weekly  INCREASE:   Metformin 500 mg 1 tablet by mouth in the morning and 2 tablets in the evening with meals     Future Considerations:  If patient reports improvement in satiety and eating more will likely increase Mounjaro up to 10 mg once weekly to help with glycemic control   Could also switch Metformin to XR formulation if diarrhea comes back to see if patient is able to tolerate with the XR at a higher dose     Monitoring and Education:  We will review updated 4-week Codi report at next follow-up  Next A1c is due at anytime, patient plans to have her updated labs completed on 03/01/25  We will follow-up to discuss if Mounjaro side effects have improved and to ensure she has done well with 3 tablets of Metformin daily     We  will plan to follow-up in ~3 weeks to see how she has tolerated the increased dose of Metformin and if she has done better with her current dose of Mounjaro. She was encouraged to reach out with any questions and/or concerns prior to then if needed.

## 2025-02-23 LAB
25(OH)D3+25(OH)D2 SERPL-MCNC: 56 NG/ML (ref 30–100)
ALBUMIN SERPL-MCNC: 4.3 G/DL (ref 3.6–5.1)
ALP SERPL-CCNC: 61 U/L (ref 37–153)
ALT SERPL-CCNC: 15 U/L (ref 6–29)
ANION GAP SERPL CALCULATED.4IONS-SCNC: 10 MMOL/L (CALC) (ref 7–17)
AST SERPL-CCNC: 13 U/L (ref 10–35)
BILIRUB SERPL-MCNC: 0.7 MG/DL (ref 0.2–1.2)
BUN SERPL-MCNC: 18 MG/DL (ref 7–25)
CALCIUM SERPL-MCNC: 11 MG/DL (ref 8.6–10.4)
CHLORIDE SERPL-SCNC: 98 MMOL/L (ref 98–110)
CHOLEST SERPL-MCNC: 208 MG/DL
CHOLEST/HDLC SERPL: 5.6 (CALC)
CO2 SERPL-SCNC: 28 MMOL/L (ref 20–32)
CREAT SERPL-MCNC: 0.89 MG/DL (ref 0.5–1.05)
EGFRCR SERPLBLD CKD-EPI 2021: 72 ML/MIN/1.73M2
ERYTHROCYTE [DISTWIDTH] IN BLOOD BY AUTOMATED COUNT: 12.4 % (ref 11–15)
EST. AVERAGE GLUCOSE BLD GHB EST-MCNC: 166 MG/DL
EST. AVERAGE GLUCOSE BLD GHB EST-SCNC: 9.2 MMOL/L
GLUCOSE SERPL-MCNC: 181 MG/DL (ref 65–99)
HBA1C MFR BLD: 7.4 % OF TOTAL HGB
HCT VFR BLD AUTO: 51.5 % (ref 35–45)
HDLC SERPL-MCNC: 37 MG/DL
HGB BLD-MCNC: 16.9 G/DL (ref 11.7–15.5)
LDLC SERPL CALC-MCNC: ABNORMAL MG/DL
MCH RBC QN AUTO: 29.3 PG (ref 27–33)
MCHC RBC AUTO-ENTMCNC: 32.8 G/DL (ref 32–36)
MCV RBC AUTO: 89.4 FL (ref 80–100)
NONHDLC SERPL-MCNC: 171 MG/DL (CALC)
PLATELET # BLD AUTO: 252 THOUSAND/UL (ref 140–400)
PMV BLD REES-ECKER: 11.4 FL (ref 7.5–12.5)
POTASSIUM SERPL-SCNC: 4.4 MMOL/L (ref 3.5–5.3)
PROT SERPL-MCNC: 6.5 G/DL (ref 6.1–8.1)
RBC # BLD AUTO: 5.76 MILLION/UL (ref 3.8–5.1)
SODIUM SERPL-SCNC: 136 MMOL/L (ref 135–146)
TRIGL SERPL-MCNC: 419 MG/DL
TSH SERPL-ACNC: 1.57 MIU/L (ref 0.4–4.5)
VIT B12 SERPL-MCNC: 414 PG/ML (ref 200–1100)
WBC # BLD AUTO: 7.7 THOUSAND/UL (ref 3.8–10.8)

## 2025-02-24 LAB
ALBUMIN/CREAT UR: 9 MG/G CREAT
CREAT UR-MCNC: 67 MG/DL (ref 20–275)
MICROALBUMIN UR-MCNC: 0.6 MG/DL

## 2025-03-07 ASSESSMENT — ENCOUNTER SYMPTOMS
VISUAL CHANGE: 0
POLYDIPSIA: 0
DIABETIC ASSOCIATED SYMPTOMS: 0

## 2025-03-07 NOTE — PROGRESS NOTES
Patient is sent at the request of Whit Kenyon MD for my opinion regarding Type 2 diabetes and hypertension.  My final recommendations will be communicated back to the requesting provider by way of shared medical record.    Subjective     Diabetes  She presents for her follow-up diabetic visit. She has type 2 diabetes mellitus. The initial diagnosis of diabetes was made 16 years ago. Her disease course has been improving. There are no hypoglycemic associated symptoms. There are no diabetic associated symptoms. Pertinent negatives for diabetes include no polydipsia, no polyuria and no visual change. (Notices more symptoms when she eats higher carbohydrates) There are no hypoglycemic complications. Symptoms are stable. There are no diabetic complications. Risk factors for coronary artery disease include diabetes mellitus, dyslipidemia, hypertension and obesity. Current diabetic treatment includes oral agent (monotherapy) (Mounjaro once weekly). She is compliant with treatment all of the time. Her weight is increasing steadily. She is following a generally healthy diet. She rarely participates in exercise. Her overall blood glucose range is >200 mg/dl. An ACE inhibitor/angiotensin II receptor blocker is being taken. She does not see a podiatrist.Eye exam is current.     At last pharmacy encounter patient was generally tolerating Mounjaro well at the 7.5 mg dose. She wanted to increase her Metformin up to 2 tablets in the evening to see if she tolerated it ok as her diarrhea was typically only after her morning dose. No changes were made to her Mounjaro dose as she had noted a drastic reduction in appetite. We are following-up today to see how she has done with her diabetes medication therapy and go over updated Codi report.     Current diet:   Since increasing Mounjaro up to 7.5 mg states that her appetite has been reduced   Trying to eat plant based with no processed foods   Breakfast: takes some pretzels with  her medications in the morning, coffee with half and half creamer, low sugar instant oatmeal at work, will also usually have a banana   Wants to try to eat an egg in the morning with her medications instead of pretzels  Lunch: tries to stick to salads with some protein, tries not to use bottled dressing, packs her lunch   Dinner: trying to eat salads as much as she can, will sometimes have sausage with them, kielbasa and sour kraut with mashed potatoes  Snacks: does snack on pretzels in between meals, avoids sweets, sometimes crackers with spread-able cheese  Fluids: water, hot tea  Still has not been eating fast food or out at restaurants  Since last encounter has been eating less carbs and watching what she eats   Has been eating more fresh fruits which can increase blood sugar     Current exercise:   Has 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 which limits her ability to exercise and walk throughout the day     Sodium intake:   Sometimes makes soups with canned black beans and green beans and other vegetables  Snacks on pretzels and crackers  Does not add salt to meals, will sometimes add salt to meals when cooking     Allergies   Allergen Reactions    Doxycycline Hives    Sulfamethoxazole-Trimethoprim Hives    Cephalexin Itching and Rash     Pruritus    Penicillins Headache, Unknown and Rash     rash    headache    rash   headache     Diabetes  The patient does have a known family history of diabetes (mother)     Patient is using: continuous glucose monitor (Codi 3 Plus) to self monitor blood sugar at home   Time in range increased significantly from 45% at last encounter to 76%          Hypoglycemia frequency: 0%   Hypoglycemia awareness: Yes       Objective     Wt Readings from Last 3 Encounters:   11/13/24 102 kg (224 lb 9.6 oz)   07/20/24 99.6 kg (219 lb 9.3 oz)   07/17/24 99.6 kg (219 lb 9.6 oz)     Estimated body mass index is 37.97 kg/m² as calculated from the following:    Height as of 7/20/24: 1.638 m (5'  "4.49\").    Weight as of 11/13/24: 102 kg (224 lb 9.6 oz).    Patient Reported Home Weights:   12/06/24: 209 lbs   12/30/24: 211 lbs    01/27/25: 202 lbs   02/17/25: 194 lbs   03/10/25: 194 lbs     Diabetes Pharmacotherapy:  Metformin 500 mg 2 tablets twice daily   Mounjaro 7.5 mg once weekly (Fridays)    Historical Diabetes Pharmacotherapy:   Januvia 100 mg (switched to Mounjaro)  Glyburide 5 mg (stopped due to hypoglycemia)     Adverse Effects:   Notes no new side effects with Metformin since increasing up to 2 tablets in the evening    Notes increased satiety with Mounjaro  Does note side effects with Mounjaro - the day after her dose had diarrhea, sulfur alecia  Has been using Pepto Bismol and Imodium OTC to help with the diarrhea  With her 3rd dose of the month noted some lightheadedness and that she was going to pass out, states this passed after a few minutes     Adherence:   States she never misses doses of her medications   Did take one of her Mounjaro doses a couple days later to help with side effects     SECONDARY PREVENTION  Statin? No  ACE-I/ARB? No  Aspirin? No    Pertinent PMH Review:  PMH of Pancreatitis: No  PMH of Retinopathy: No  PMH of Urinary Tract Infections: Yes, does not get frequently   PMH of MTC: No    Medication Reconciliation  No changes were made to patient's medication list during encounter today     Current Outpatient Medications on File Prior to Visit   Medication Sig Dispense Refill    alcohol swabs pads, medicated USE TO CLEANSE SKIN PRIOR TO BLOOD GLUCOSE TESTING TWICE DAILY 200 each 3    atenolol (Tenormin) 25 mg tablet Take 1 tablet (25 mg) by mouth 2 times a day. (Patient taking differently: Take 1 tablet (25 mg) by mouth once daily.) 180 tablet 3    blood sugar diagnostic (OneTouch Verio test strips) strip TEST BLOOD GLUCOSE TWICE DAILY. 200 strip 3    blood-glucose sensor (FreeStyle Codi 3 Plus Sensor) device Apply 1 sensor to the back of the arm for continuous monitoring " "of blood sugar. Remove and apply new sensor every 15 days. 2 each 11    blood-glucose sensor (FreeStyle Codi 3 Sensor) device Apply 1 sensor to the back of the arm for continuous glucose monitoring. Remove and apply new sensor every 14 days 6 each 3    clobetasoL-emollient 0.05 % cream APPY AND GENTLY MASSAGE INTO AFFECTED AREA(S) TWICE DAILY AS NEEDED FOR ITCHING 180 g 3    cyanocobalamin (Vitamin B-12) 1,000 mcg/mL injection Inject 1 mL (1,000 mcg) into the muscle every 30 (thirty) days. 3 mL 3    ergocalciferol (Vitamin D-2) 1.25 MG (99923 UT) capsule Take 1 capsule (1,250 mcg) by mouth 1 (one) time per week. 4 capsule 3    estradiol (Estrace) 0.01 % (0.1 mg/gram) vaginal cream APPLY 0.5 GRAMS VAGINALLY WEEKLY AS DIRECTED 127.5 g 3    fluticasone (Flonase) 50 mcg/actuation nasal spray Administer 2 sprays into each nostril once daily as needed for rhinitis or allergies. 48 g 3    lancets (OneTouch Delica Plus Lancet) 33 gauge misc USE TO TEST TWICE DAILY. 200 each 3    levothyroxine (Synthroid, Levoxyl) 50 mcg tablet Take 1 tablet (50 mcg) by mouth once daily. 90 tablet 3    lisinopril 20 mg tablet Take 1 tablet (20 mg) by mouth once daily. 90 tablet 3    meclizine (Antivert) 12.5 mg tablet TAKE 1-2 TABLETS BY MOUTH EVERY 4-6 HOURS PRN FOR DIZZINESS. CAN CAUSE DROWSINESS. 30 tablet 3    metFORMIN (Glucophage) 500 mg tablet Take 1 tablet (500 mg) by mouth in the morning and 2 tablets (1,000 mg) in the evening with meals. 180 tablet 3    nystatin-triamcinolone (Mycolog II) cream Apply 1 Application topically 3 times a day as needed. 180 g 3    OneTouch Verio Meter misc test as directed      syringe with needle (BD Luer-Adrian Syringe) 3 mL 25 gauge x 1\" syringe USE FOR VITAMIN B12 INJECTIONS ONCE A MONTH 3 each 3    triamterene-hydrochlorothiazid (Maxzide-25) 37.5-25 mg tablet Take 1 tablet by mouth once daily. Take with food. 90 tablet 3    [DISCONTINUED] tirzepatide (Mounjaro) 7.5 mg/0.5 mL pen injector Inject 7.5 " mg under the skin 1 (one) time per week. 2 mL 0     No current facility-administered medications on file prior to visit.      Lab Review  Lab Results   Component Value Date    BILITOT 0.7 02/22/2025    CALCIUM 11.0 (H) 02/22/2025    CO2 28 02/22/2025    CL 98 02/22/2025    CREATININE 0.89 02/22/2025    GLUCOSE 181 (H) 02/22/2025    ALKPHOS 61 02/22/2025    K 4.4 02/22/2025    PROT 6.5 02/22/2025     02/22/2025    AST 13 02/22/2025    ALT 15 02/22/2025    BUN 18 02/22/2025    ANIONGAP 10 02/22/2025    MG 1.70 04/20/2024    PHOS 3.0 01/30/2021    ALBUMIN 4.3 02/22/2025    AMYLASE 86 12/02/2020    LIPASE 122 (H) 12/02/2020    GFRF 64 08/24/2023    GFRMALE CANCELED 08/24/2023     Lab Results   Component Value Date    TRIG 419 (H) 02/22/2025    CHOL 208 (H) 02/22/2025    LDLCALC  02/22/2025      Comment:         LDL cholesterol not calculated. Triglyceride levels  greater than 400 mg/dL invalidate calculated LDL results.           Reference range: <100     Desirable range <100 mg/dL for primary prevention;    <70 mg/dL for patients with CHD or diabetic patients   with > or = 2 CHD risk factors.     LDL-C is now calculated using the Sudhir-Agustín   calculation, which is a validated novel method providing   better accuracy than the Friedewald equation in the   estimation of LDL-C.   Sudhir SS et al. KAYLIE. 2013;310(19): 8170-6750   (http://education.[employer]faq/UFN618)      HDL 37 (L) 02/22/2025     Lab Results   Component Value Date    HGBA1C 7.4 (H) 02/22/2025    HGBA1C 7.6 (H) 08/10/2024    HGBA1C 7.9 (H) 04/20/2024     The 10-year ASCVD risk score (Duane ZHOU, et al., 2019) is: 19.1%    Values used to calculate the score:      Age: 64 years      Sex: Female      Is Non- : No      Diabetic: Yes      Tobacco smoker: No      Systolic Blood Pressure: 138 mmHg      Is BP treated: Yes      HDL Cholesterol: 37 mg/dL      Total Cholesterol: 208 mg/dL    Health Maintenance:   Foot Exam:  None  Eye Exam: scheduled for 03/11/2025  Lipid Panel:  mg/dL,  mg/dL 04/20/24  Urine Albumin: Unable to calculate due to albumin <7 04/20/24  Influenza Vaccine: 10/05/24  Pneumonia Vaccine: PPSV23 07/09/13, PCV20 11/13/24    Drug Interactions:  No significant drug-drug interactions exist requiring adjustment to medication therapy       Assessment/Plan   Problem List Items Addressed This Visit       Type 2 diabetes mellitus without complication, without long-term current use of insulin (Multi) - Primary     Patient's goal A1c is < 7%.  Is pt at goal? No, most recent A1c was 7.4% on 02/22/25 which had decreased slightly from 7.6% on 08/10/24.  Patient's time in range on her Codi has increased significantly from 45% at last follow-up to 76% over the past 4 weeks. This is at goal of >70% time in range.     Rationale for plan:   Patient has been doing well taking 3 Metformin tablets per day and states that this has continued to keep the diarrhea from Metformin at bay   Does note some diarrhea the first day after her Mounjaro dose and sulfur burping, would not like to titrate her dose up at this time but is agreeable to continuing on the 7.5 mg dose of Mounjaro to see if side effects improve    Medication Changes:  CONTINUE:   Mounjaro 7.5 mg under the skin once weekly   Metformin 500 mg 1 tablet by mouth in the morning and 2 tablets in the evening with meals     Future Considerations:  If patient reports improvement in diarrhea and burping could increase Mounjaro up to 10 mg   If diarrhea does not improve could consider reducing dose back down to 5 mg once weekly     Monitoring and Education:  We will review updated 4-week Codi report at next follow-up  Next A1c is due at anytime after 05/22/25   We will also assess for improvement in side effects from Mounjaro at next follow-up, specifically diarrhea and burping    We will plan to follow-up in ~3 weeks to see how she has continued to tolerate her current  doses of her diabetic medications. She was encouraged to reach out with any questions and/or concerns prior to then if needed.          Relevant Medications    tirzepatide (Mounjaro) 7.5 mg/0.5 mL pen injector    Other Relevant Orders    Referral to Clinical Pharmacy     Pharmacy Follow-Up: 03/31/2025     PCP Follow-Up: 03/11/2025    Jorge Miller PharmD     Continue all meds under the continuation of care with the referring provider and clinical pharmacy team.

## 2025-03-10 ENCOUNTER — APPOINTMENT (OUTPATIENT)
Dept: PHARMACY | Facility: HOSPITAL | Age: 65
End: 2025-03-10
Payer: COMMERCIAL

## 2025-03-10 ENCOUNTER — PHARMACY VISIT (OUTPATIENT)
Dept: PHARMACY | Facility: CLINIC | Age: 65
End: 2025-03-10
Payer: COMMERCIAL

## 2025-03-10 DIAGNOSIS — E11.9 TYPE 2 DIABETES MELLITUS WITHOUT COMPLICATION, WITHOUT LONG-TERM CURRENT USE OF INSULIN (MULTI): Primary | ICD-10-CM

## 2025-03-10 PROCEDURE — RXMED WILLOW AMBULATORY MEDICATION CHARGE

## 2025-03-10 RX ORDER — TIRZEPATIDE 7.5 MG/.5ML
7.5 INJECTION, SOLUTION SUBCUTANEOUS WEEKLY
Qty: 2 ML | Refills: 0 | Status: SHIPPED | OUTPATIENT
Start: 2025-03-10

## 2025-03-10 NOTE — ASSESSMENT & PLAN NOTE
Patient's goal A1c is < 7%.  Is pt at goal? No, most recent A1c was 7.4% on 02/22/25 which had decreased slightly from 7.6% on 08/10/24.  Patient's time in range on her Codi has increased significantly from 45% at last follow-up to 76% over the past 4 weeks. This is at goal of >70% time in range.     Rationale for plan:   Patient has been doing well taking 3 Metformin tablets per day and states that this has continued to keep the diarrhea from Metformin at bay   Does note some diarrhea the first day after her Mounjaro dose and sulfur burping, would not like to titrate her dose up at this time but is agreeable to continuing on the 7.5 mg dose of Mounjaro to see if side effects improve    Medication Changes:  CONTINUE:   Mounjaro 7.5 mg under the skin once weekly   Metformin 500 mg 1 tablet by mouth in the morning and 2 tablets in the evening with meals     Future Considerations:  If patient reports improvement in diarrhea and burping could increase Mounjaro up to 10 mg   If diarrhea does not improve could consider reducing dose back down to 5 mg once weekly     Monitoring and Education:  We will review updated 4-week Codi report at next follow-up  Next A1c is due at anytime after 05/22/25   We will also assess for improvement in side effects from Mounjaro at next follow-up, specifically diarrhea and burping    We will plan to follow-up in ~3 weeks to see how she has continued to tolerate her current doses of her diabetic medications. She was encouraged to reach out with any questions and/or concerns prior to then if needed.

## 2025-03-11 ENCOUNTER — APPOINTMENT (OUTPATIENT)
Dept: PRIMARY CARE | Facility: CLINIC | Age: 65
End: 2025-03-11
Payer: COMMERCIAL

## 2025-03-11 VITALS
OXYGEN SATURATION: 97 % | SYSTOLIC BLOOD PRESSURE: 118 MMHG | DIASTOLIC BLOOD PRESSURE: 73 MMHG | HEIGHT: 64 IN | BODY MASS INDEX: 33.36 KG/M2 | TEMPERATURE: 96.9 F | WEIGHT: 195.4 LBS | HEART RATE: 76 BPM

## 2025-03-11 DIAGNOSIS — Z12.31 VISIT FOR SCREENING MAMMOGRAM: ICD-10-CM

## 2025-03-11 DIAGNOSIS — E21.3 HYPERPARATHYROIDISM (MULTI): ICD-10-CM

## 2025-03-11 DIAGNOSIS — I10 PRIMARY HYPERTENSION: ICD-10-CM

## 2025-03-11 DIAGNOSIS — L98.9 SKIN LESION: ICD-10-CM

## 2025-03-11 DIAGNOSIS — D58.2 ELEVATED HEMOGLOBIN (CMS-HCC): ICD-10-CM

## 2025-03-11 DIAGNOSIS — I10 BENIGN ESSENTIAL HYPERTENSION: ICD-10-CM

## 2025-03-11 DIAGNOSIS — E78.49 OTHER HYPERLIPIDEMIA: ICD-10-CM

## 2025-03-11 DIAGNOSIS — Z78.0 ASYMPTOMATIC MENOPAUSE: ICD-10-CM

## 2025-03-11 DIAGNOSIS — E83.52 HYPERCALCEMIA: ICD-10-CM

## 2025-03-11 DIAGNOSIS — E11.9 TYPE 2 DIABETES MELLITUS WITHOUT COMPLICATION, WITHOUT LONG-TERM CURRENT USE OF INSULIN (MULTI): ICD-10-CM

## 2025-03-11 DIAGNOSIS — E53.8 VITAMIN B12 DEFICIENCY: ICD-10-CM

## 2025-03-11 DIAGNOSIS — Z00.00 ANNUAL PHYSICAL EXAM: Primary | ICD-10-CM

## 2025-03-11 DIAGNOSIS — K21.9 GASTROESOPHAGEAL REFLUX DISEASE WITHOUT ESOPHAGITIS: ICD-10-CM

## 2025-03-11 DIAGNOSIS — D32.9 MENINGIOMA (MULTI): ICD-10-CM

## 2025-03-11 DIAGNOSIS — E55.9 VITAMIN D DEFICIENCY: ICD-10-CM

## 2025-03-11 DIAGNOSIS — E03.9 ACQUIRED HYPOTHYROIDISM: ICD-10-CM

## 2025-03-11 DIAGNOSIS — Z13.820 SCREENING FOR OSTEOPOROSIS: ICD-10-CM

## 2025-03-11 PROCEDURE — 93000 ELECTROCARDIOGRAM COMPLETE: CPT | Performed by: INTERNAL MEDICINE

## 2025-03-11 PROCEDURE — 4010F ACE/ARB THERAPY RXD/TAKEN: CPT | Performed by: INTERNAL MEDICINE

## 2025-03-11 PROCEDURE — 1160F RVW MEDS BY RX/DR IN RCRD: CPT | Performed by: INTERNAL MEDICINE

## 2025-03-11 PROCEDURE — 3074F SYST BP LT 130 MM HG: CPT | Performed by: INTERNAL MEDICINE

## 2025-03-11 PROCEDURE — 1159F MED LIST DOCD IN RCRD: CPT | Performed by: INTERNAL MEDICINE

## 2025-03-11 PROCEDURE — 3008F BODY MASS INDEX DOCD: CPT | Performed by: INTERNAL MEDICINE

## 2025-03-11 PROCEDURE — 3078F DIAST BP <80 MM HG: CPT | Performed by: INTERNAL MEDICINE

## 2025-03-11 PROCEDURE — 99397 PER PM REEVAL EST PAT 65+ YR: CPT | Performed by: INTERNAL MEDICINE

## 2025-03-11 RX ORDER — ATENOLOL 25 MG/1
25 TABLET ORAL DAILY
Start: 2025-03-11

## 2025-03-11 ASSESSMENT — PATIENT HEALTH QUESTIONNAIRE - PHQ9
1. LITTLE INTEREST OR PLEASURE IN DOING THINGS: NOT AT ALL
2. FEELING DOWN, DEPRESSED OR HOPELESS: NOT AT ALL
SUM OF ALL RESPONSES TO PHQ9 QUESTIONS 1 AND 2: 0

## 2025-03-11 NOTE — PROGRESS NOTES
"Subjective   Patient ID: Shwetha Madrigal is a 64 y.o. female who presents for Annual Exam (Patient is here for an annual physical exam. ).    Here for CPE and to  follow up,she sees Jorgepharmacist,for her DM,on Mounjaro,lost weight,but has some GI side effect,will not tolerate higher dose  Here to go over her lab results.  She has HTN,DM-2,HLD,hypothyroid,fatty liver.  She still has her vertigo since her covid 2 years ago.she has a desk job.she always uses her cane for ambulation.  She saw several specialist.  She sees ophthalmologist for eye exam.  BI MAMMOGRAM: 7/20/2024  COLONOSCOPY: 8/16/2021,was due in 2024(3 years), colonoscopy order done by me last year patient did not go because this did not cover last time it caused her thousands of dollars.  BONE DENSITY: 2/20/2023         Review of Systems   Constitutional:         Has HPI lost weight with Mounjaro   HENT: Negative.     Eyes: Negative.    Respiratory: Negative.     Cardiovascular: Negative.    Gastrointestinal: Negative.    Genitourinary: Negative.    Neurological:         As HPI   Hematological: Negative.    Psychiatric/Behavioral: Negative.         Objective   /73 (BP Location: Left arm, Patient Position: Sitting, BP Cuff Size: Adult)   Pulse 76   Temp 36.1 °C (96.9 °F) (Temporal)   Ht 1.626 m (5' 4\")   Wt 88.6 kg (195 lb 6.4 oz)   SpO2 97%   BMI 33.54 kg/m²     Physical Exam  Vitals reviewed.   Constitutional:       General: She is not in acute distress.     Appearance: Normal appearance.   HENT:      Head: Normocephalic and atraumatic.      Right Ear: Tympanic membrane, ear canal and external ear normal.      Left Ear: Tympanic membrane, ear canal and external ear normal.      Mouth/Throat:      Mouth: Mucous membranes are moist.      Pharynx: No oropharyngeal exudate or posterior oropharyngeal erythema.   Eyes:      General: No scleral icterus.     Extraocular Movements: Extraocular movements intact.      Conjunctiva/sclera: " Conjunctivae normal.      Pupils: Pupils are equal, round, and reactive to light.   Neck:      Vascular: No carotid bruit.   Cardiovascular:      Rate and Rhythm: Normal rate and regular rhythm.      Pulses: Normal pulses.      Heart sounds: Normal heart sounds. No murmur heard.  Pulmonary:      Effort: Pulmonary effort is normal. No respiratory distress.      Breath sounds: Normal breath sounds. No wheezing or rales.   Chest:   Breasts:     Right: Normal.      Left: Normal.   Abdominal:      General: Abdomen is flat. Bowel sounds are normal.      Palpations: Abdomen is soft.      Tenderness: There is no right CVA tenderness or left CVA tenderness.   Musculoskeletal:         General: Normal range of motion.      Cervical back: Normal range of motion and neck supple.   Lymphadenopathy:      Cervical: No cervical adenopathy.      Upper Body:      Right upper body: No supraclavicular or axillary adenopathy.      Left upper body: No supraclavicular or axillary adenopathy.   Neurological:      General: No focal deficit present.      Mental Status: She is alert and oriented to person, place, and time.   Psychiatric:         Mood and Affect: Mood normal.         Assessment/Plan   Problem List Items Addressed This Visit             ICD-10-CM    Vitamin B12 deficiency E53.8    Annual physical exam - Primary Z00.00     Complete physical examination completed today.  Advised to keep a heart healthy, low-fat diet as recommended diet is the Mediterranean diet.  Advised to exercise regularly for 30 minutes daily 5 days a week and maintain 150 minutes of exercise per week.  Discussed age-appropriate cancer screening,Immunization and recommendation were given.  Advised on regular eye and dental visit.  Advised on staying well-hydrated.         Type 2 diabetes mellitus without complication, without long-term current use of insulin (Multi) E11.9     Stable,continue same meds,follow 1800 darshana ADA diet,monitor labs with yearly eye  exam.         Vitamin D deficiency E55.9    Primary hypertension I10    Relevant Medications    atenolol (Tenormin) 25 mg tablet    Other Relevant Orders    ECG 12 lead (Clinic Performed) (Completed)    Other hyperlipidemia E78.49    Skin lesion L98.9    Relevant Orders    Referral to Dermatology    Elevated hemoglobin (CMS-HCC) D58.2     Patient .was seen in past by hematologist         Esophageal reflux K21.9    Hypercalcemia E83.52    Relevant Orders    Calcium (Completed)    PTH, intact (Completed)    Hyperparathyroidism (Multi) E21.3     Monitor PTH and calcium.  Was seen by endo in past         Meningioma (Multi) D32.9     Monitor with MRI periodically,no headache         Hypothyroidism E03.9    Benign essential hypertension I10     Stable on current medication.          Other Visit Diagnoses         Codes    Visit for screening mammogram     Z12.31    Relevant Orders    BI mammo bilateral screening tomosynthesis    Asymptomatic menopause     Z78.0    Relevant Orders    XR DEXA bone density    Screening for osteoporosis     Z13.820    Relevant Orders    XR DEXA bone density

## 2025-03-12 LAB
CALCIUM SERPL-MCNC: 11.6 MG/DL (ref 8.6–10.4)
PTH-INTACT SERPL-MCNC: 90 PG/ML (ref 16–77)

## 2025-03-13 ASSESSMENT — ENCOUNTER SYMPTOMS
GASTROINTESTINAL NEGATIVE: 1
PSYCHIATRIC NEGATIVE: 1
HEMATOLOGIC/LYMPHATIC NEGATIVE: 1
RESPIRATORY NEGATIVE: 1
CARDIOVASCULAR NEGATIVE: 1
EYES NEGATIVE: 1

## 2025-03-17 DIAGNOSIS — E53.8 VITAMIN B12 DEFICIENCY: ICD-10-CM

## 2025-03-17 RX ORDER — SYRINGE WITH NEEDLE, 1 ML 25GX5/8"
SYRINGE, EMPTY DISPOSABLE MISCELLANEOUS
Qty: 3 EACH | Refills: 3 | Status: SHIPPED | OUTPATIENT
Start: 2025-03-17

## 2025-03-31 ENCOUNTER — APPOINTMENT (OUTPATIENT)
Dept: PHARMACY | Facility: HOSPITAL | Age: 65
End: 2025-03-31
Payer: COMMERCIAL

## 2025-03-31 ENCOUNTER — PHARMACY VISIT (OUTPATIENT)
Dept: PHARMACY | Facility: CLINIC | Age: 65
End: 2025-03-31
Payer: COMMERCIAL

## 2025-03-31 DIAGNOSIS — E11.9 TYPE 2 DIABETES MELLITUS WITHOUT COMPLICATION, WITHOUT LONG-TERM CURRENT USE OF INSULIN: Primary | ICD-10-CM

## 2025-03-31 PROCEDURE — RXMED WILLOW AMBULATORY MEDICATION CHARGE

## 2025-03-31 RX ORDER — TIRZEPATIDE 7.5 MG/.5ML
7.5 INJECTION, SOLUTION SUBCUTANEOUS WEEKLY
Qty: 6 ML | Refills: 0 | Status: SHIPPED | OUTPATIENT
Start: 2025-03-31

## 2025-03-31 ASSESSMENT — ENCOUNTER SYMPTOMS
POLYDIPSIA: 0
DIABETIC ASSOCIATED SYMPTOMS: 0
VISUAL CHANGE: 0

## 2025-03-31 NOTE — PROGRESS NOTES
Patient is sent at the request of Whit Kenyon MD for my opinion regarding Type 2 diabetes and hypertension.  My final recommendations will be communicated back to the requesting provider by way of shared medical record.    Subjective     Diabetes  She presents for her follow-up diabetic visit. She has type 2 diabetes mellitus. The initial diagnosis of diabetes was made 16 years ago. Her disease course has been improving. There are no hypoglycemic associated symptoms. There are no diabetic associated symptoms. Pertinent negatives for diabetes include no polydipsia, no polyuria and no visual change. (Notices more symptoms when she eats higher carbohydrates) There are no hypoglycemic complications. Symptoms are stable. There are no diabetic complications. Risk factors for coronary artery disease include diabetes mellitus, dyslipidemia, hypertension and obesity. Current diabetic treatment includes oral agent (monotherapy) (Mounjaro once weekly). She is compliant with treatment all of the time. Her weight is decreasing steadily. She is following a generally healthy diet. She rarely participates in exercise. Her overall blood glucose range is 140-180 mg/dl. An ACE inhibitor/angiotensin II receptor blocker is being taken. She does not see a podiatrist.Eye exam is current.     At last pharmacy encounter patient was having some increased burping and some diarrhea the first day after her dose of Mounjaro. She wanted to continue on the 7.5 mg dose to see if her side effects improved before titrating up.    Since last encounter patient did see Dr. Kenyon on 03/11/25. She reported having some GI upset and does not think she'll be able to tolerate higher doses of Mounjaro. No changes were made to her medications at that time.     Current diet:   No changes reported since last pharmacy encounter  Since increasing Mounjaro up to 7.5 mg states that her appetite has been reduced   Trying to eat plant based with no processed  foods   Breakfast: takes some pretzels with her medications in the morning, coffee with half and half creamer, low sugar instant oatmeal at work, will also usually have a banana   Wants to try to eat an egg in the morning with her medications instead of pretzels  Lunch: tries to stick to salads with some protein, tries not to use bottled dressing, packs her lunch   Dinner: trying to eat salads as much as she can, will sometimes have sausage with them, kielbasa and sour kraut with mashed potatoes  Snacks: does snack on pretzels in between meals, avoids sweets, sometimes crackers with spread-able cheese  Fluids: water, hot tea  Still has not been eating fast food or out at restaurants  Since last encounter has been eating less carbs and watching what she eats   Has been eating more fresh fruits which can increase blood sugar     Current exercise:   No changes reported from last pharmacy encounter  Has 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 which limits her ability to exercise and walk throughout the day     Sodium intake:   Sometimes makes soups with canned black beans and green beans and other vegetables  Snacks on pretzels and crackers  Does not add salt to meals, will sometimes add salt to meals when cooking     Allergies   Allergen Reactions    Doxycycline Hives    Sulfamethoxazole-Trimethoprim Hives    Cephalexin Itching and Rash     Pruritus    Penicillins Headache, Unknown and Rash     rash    headache    rash   headache     Diabetes  The patient does have a known family history of diabetes (mother)     Patient is using: continuous glucose monitor (Codi 3 Plus) to self monitor blood sugar at home   Time in range increased from 76% to 88%            Hypoglycemia frequency: 0%   Hypoglycemia awareness: Yes       Objective     Wt Readings from Last 3 Encounters:   03/11/25 88.6 kg (195 lb 6.4 oz)   11/13/24 102 kg (224 lb 9.6 oz)   07/20/24 99.6 kg (219 lb 9.3 oz)     Estimated body mass index is 33.54 kg/m² as calculated  "from the following:    Height as of 3/11/25: 1.626 m (5' 4\").    Weight as of 3/11/25: 88.6 kg (195 lb 6.4 oz).    Patient Reported Home Weights:   12/06/24: 209 lbs   12/30/24: 211 lbs    01/27/25: 202 lbs   02/17/25: 194 lbs   03/10/25: 194 lbs   03/31/25: 187 lbs     Diabetes Pharmacotherapy:  Metformin 500 mg 2 tablets twice daily   Mounjaro 7.5 mg once weekly (Fridays)    Historical Diabetes Pharmacotherapy:   Januvia 100 mg (switched to Mounjaro)  Glyburide 5 mg (stopped due to hypoglycemia)     Adverse Effects:   Today notes that after our last encounter, her next 7.5 mg dose she reports \"rainbow zig-zags\" in her eyes. This only happened for one episode and has not happened with her doses since. Saw optometrist and they told her it was similar to a migraine aura and wasn't concerned.   Has some sporadic diarrhea - states this could be from Metformin or Mounjaro, reports   Has been using Pepto Bismol and Imodium OTC if needed to help with the diarrhea  Has some mild nausea 2-3 days after her dose of Mounjaro and still reports burping   Uses peppermint capsules to help     Adherence:   States she never misses doses of her medications   Did take one of her Mounjaro doses a couple days later to help with side effects     SECONDARY PREVENTION  Statin? No  ACE-I/ARB? No  Aspirin? No    Pertinent PMH Review:  PMH of Pancreatitis: No  PMH of Retinopathy: No  PMH of Urinary Tract Infections: Yes, does not get frequently   PMH of MTC: No    Medication Reconciliation  No changes were made to patient's medication list during encounter today     Current Outpatient Medications on File Prior to Visit   Medication Sig Dispense Refill    alcohol swabs pads, medicated USE TO CLEANSE SKIN PRIOR TO BLOOD GLUCOSE TESTING TWICE DAILY 200 each 3    atenolol (Tenormin) 25 mg tablet Take 1 tablet (25 mg) by mouth once daily.      blood sugar diagnostic (OneTouch Verio test strips) strip TEST BLOOD GLUCOSE TWICE DAILY. 200 strip 3    " "blood-glucose sensor (FreeStyle Codi 3 Plus Sensor) device Apply 1 sensor to the back of the arm for continuous monitoring of blood sugar. Remove and apply new sensor every 15 days. 2 each 11    blood-glucose sensor (FreeStyle Codi 3 Sensor) device Apply 1 sensor to the back of the arm for continuous glucose monitoring. Remove and apply new sensor every 14 days 6 each 3    clobetasoL-emollient 0.05 % cream APPY AND GENTLY MASSAGE INTO AFFECTED AREA(S) TWICE DAILY AS NEEDED FOR ITCHING 180 g 3    cyanocobalamin (Vitamin B-12) 1,000 mcg/mL injection Inject 1 mL (1,000 mcg) into the muscle every 30 (thirty) days. 3 mL 3    ergocalciferol (Vitamin D-2) 1.25 MG (76938 UT) capsule Take 1 capsule (1,250 mcg) by mouth 1 (one) time per week. 4 capsule 3    estradiol (Estrace) 0.01 % (0.1 mg/gram) vaginal cream APPLY 0.5 GRAMS VAGINALLY WEEKLY AS DIRECTED 127.5 g 3    fluticasone (Flonase) 50 mcg/actuation nasal spray Administer 2 sprays into each nostril once daily as needed for rhinitis or allergies. 48 g 3    lancets (OneTouch Delica Plus Lancet) 33 gauge misc USE TO TEST TWICE DAILY. 200 each 3    levothyroxine (Synthroid, Levoxyl) 50 mcg tablet Take 1 tablet (50 mcg) by mouth once daily. 90 tablet 3    lisinopril 20 mg tablet Take 1 tablet (20 mg) by mouth once daily. 90 tablet 3    meclizine (Antivert) 12.5 mg tablet TAKE 1-2 TABLETS BY MOUTH EVERY 4-6 HOURS PRN FOR DIZZINESS. CAN CAUSE DROWSINESS. 30 tablet 3    metFORMIN (Glucophage) 500 mg tablet Take 1 tablet (500 mg) by mouth in the morning and 2 tablets (1,000 mg) in the evening with meals. 180 tablet 3    nystatin-triamcinolone (Mycolog II) cream Apply 1 Application topically 3 times a day as needed. 180 g 3    OneTouch Verio Meter misc test as directed      syringe with needle (BD Luer-Adrian Syringe) 3 mL 25 gauge x 1\" syringe USE FOR VITAMIN B12 INJECTIONS ONCE A MONTH 3 each 3    triamterene-hydrochlorothiazid (Maxzide-25) 37.5-25 mg tablet Take 1 tablet by " mouth once daily. Take with food. 90 tablet 3    [DISCONTINUED] tirzepatide (Mounjaro) 7.5 mg/0.5 mL pen injector Inject 7.5 mg under the skin 1 (one) time per week. 2 mL 0     No current facility-administered medications on file prior to visit.      Lab Review  Lab Results   Component Value Date    BILITOT 0.7 02/22/2025    CALCIUM 11.6 (H) 03/11/2025    CO2 28 02/22/2025    CL 98 02/22/2025    CREATININE 0.89 02/22/2025    GLUCOSE 181 (H) 02/22/2025    ALKPHOS 61 02/22/2025    K 4.4 02/22/2025    PROT 6.5 02/22/2025     02/22/2025    AST 13 02/22/2025    ALT 15 02/22/2025    BUN 18 02/22/2025    ANIONGAP 10 02/22/2025    MG 1.70 04/20/2024    PHOS 3.0 01/30/2021    ALBUMIN 4.3 02/22/2025    AMYLASE 86 12/02/2020    LIPASE 122 (H) 12/02/2020    GFRF 64 08/24/2023    GFRMALE CANCELED 08/24/2023     Lab Results   Component Value Date    TRIG 419 (H) 02/22/2025    CHOL 208 (H) 02/22/2025    LDLCALC  02/22/2025      Comment:         LDL cholesterol not calculated. Triglyceride levels  greater than 400 mg/dL invalidate calculated LDL results.           Reference range: <100     Desirable range <100 mg/dL for primary prevention;    <70 mg/dL for patients with CHD or diabetic patients   with > or = 2 CHD risk factors.     LDL-C is now calculated using the Sudhir-Agustín   calculation, which is a validated novel method providing   better accuracy than the Friedewald equation in the   estimation of LDL-C.   uSdhir SS et al. KAYLIE. 2013;310(19): 5287-8040   (http://education.[employer].com/faq/QYF370)      HDL 37 (L) 02/22/2025     Lab Results   Component Value Date    HGBA1C 7.4 (H) 02/22/2025    HGBA1C 7.6 (H) 08/10/2024    HGBA1C 7.9 (H) 04/20/2024     The 10-year ASCVD risk score (Duane ZHOU, et al., 2019) is: 15.5%    Values used to calculate the score:      Age: 65 years      Sex: Female      Is Non- : No      Diabetic: Yes      Tobacco smoker: No      Systolic Blood Pressure: 118  "mmHg      Is BP treated: Yes      HDL Cholesterol: 37 mg/dL      Total Cholesterol: 208 mg/dL    Health Maintenance:   Foot Exam: None  Eye Exam: 03/11/25  Lipid Panel: LDL unable to calculate due to elevated TG,  mg/dL 02/22/25  Urine Albumin: 9 mg/g Crt 02/22/25  Influenza Vaccine: 10/05/24  Pneumonia Vaccine: PPSV23 07/09/13, PCV20 11/13/24    Drug Interactions:  No significant drug-drug interactions exist requiring adjustment to medication therapy       Assessment/Plan   Problem List Items Addressed This Visit       Type 2 diabetes mellitus without complication, without long-term current use of insulin (Multi) - Primary     Patient's goal A1c is < 7%.  Is pt at goal? No, most recent A1c was 7.4% on 02/22/25 which had decreased slightly from 7.6% on 08/10/24.  Patient's time in range on her Codi has increased from 76% at last follow-up to 86% over the past 4 weeks. This is at goal of >70% time in range.     Rationale for plan:   Today patient did report an episode of \"rainbow zig zags\" in her vision after her dose of Mounjaro but states it only happened the one episode. Her optometrist told her it sounded like auras before a migraine and was not concerned.   Continues to report some sporadic diarrhea but is not sure if this is due to the Metformin or Mounjaro. It is still significantly improved from when she was on 4 tablets of Metformin per day.   Still reports some mild nausea and burping 2-3 days after her dose  Since time in range is at goal, she can continue at her current medication doses. I would not recommend increasing her Mounjaro dose at this time to avoid worsening side effects.     Medication Changes:  CONTINUE:   Mounjaro 7.5 mg under the skin once weekly   Metformin 500 mg 1 tablet by mouth in the morning and 2 tablets in the evening with meals     Future Considerations:  If patient continues to not have any episodes of visual changes and GI related side effects improve, could titrate up to " 10 mg once weekly in the future if blood sugars worsen  If diarrhea becomes more significant could reduce Metformin down to 1 tablet twice daily     Monitoring and Education:  We will review updated 4-week Codi report at next follow-up  Next A1c is due at anytime after 05/22/25 - order placed today for patient to have completed prior to our next follow-up  Will assess side effects of therapy to ensure they are not worsening and patient is doing well   Patient encouraged to keep up the good work as her blood sugars have remained in good range    We will plan to follow-up in ~1.5 months after her next A1c to see if it has improved. She was encouraged to reach out with any questions and/or concerns prior to then if needed.          Relevant Medications    tirzepatide (Mounjaro) 7.5 mg/0.5 mL pen injector    Other Relevant Orders    Referral to Clinical Pharmacy    Hemoglobin A1c     Pharmacy Follow-Up: 06/02/2025     PCP Follow-Up: 08/04/2025    Jorge Miller PharmD     Continue all meds under the continuation of care with the referring provider and clinical pharmacy team.

## 2025-03-31 NOTE — ASSESSMENT & PLAN NOTE
"Patient's goal A1c is < 7%.  Is pt at goal? No, most recent A1c was 7.4% on 02/22/25 which had decreased slightly from 7.6% on 08/10/24.  Patient's time in range on her Codi has increased from 76% at last follow-up to 86% over the past 4 weeks. This is at goal of >70% time in range.     Rationale for plan:   Today patient did report an episode of \"rainbow freddy fleming\" in her vision after her dose of Mounjaro but states it only happened the one episode. Her optometrist told her it sounded like auras before a migraine and was not concerned.   Continues to report some sporadic diarrhea but is not sure if this is due to the Metformin or Mounjaro. It is still significantly improved from when she was on 4 tablets of Metformin per day.   Still reports some mild nausea and burping 2-3 days after her dose  Since time in range is at goal, she can continue at her current medication doses. I would not recommend increasing her Mounjaro dose at this time to avoid worsening side effects.     Medication Changes:  CONTINUE:   Mounjaro 7.5 mg under the skin once weekly   Metformin 500 mg 1 tablet by mouth in the morning and 2 tablets in the evening with meals     Future Considerations:  If patient continues to not have any episodes of visual changes and GI related side effects improve, could titrate up to 10 mg once weekly in the future if blood sugars worsen  If diarrhea becomes more significant could reduce Metformin down to 1 tablet twice daily     Monitoring and Education:  We will review updated 4-week Codi report at next follow-up  Next A1c is due at anytime after 05/22/25 - order placed today for patient to have completed prior to our next follow-up  Will assess side effects of therapy to ensure they are not worsening and patient is doing well   Patient encouraged to keep up the good work as her blood sugars have remained in good range    We will plan to follow-up in ~1.5 months after her next A1c to see if it has improved. She " was encouraged to reach out with any questions and/or concerns prior to then if needed.

## 2025-04-02 ENCOUNTER — APPOINTMENT (OUTPATIENT)
Dept: PRIMARY CARE | Facility: CLINIC | Age: 65
End: 2025-04-02
Payer: COMMERCIAL

## 2025-04-25 DIAGNOSIS — E03.9 ACQUIRED HYPOTHYROIDISM: ICD-10-CM

## 2025-04-25 RX ORDER — LEVOTHYROXINE SODIUM 50 UG/1
50 TABLET ORAL DAILY
Qty: 90 TABLET | Refills: 3 | Status: SHIPPED | OUTPATIENT
Start: 2025-04-25

## 2025-05-02 DIAGNOSIS — R21 RASH: ICD-10-CM

## 2025-05-02 DIAGNOSIS — I10 BENIGN ESSENTIAL HYPERTENSION: ICD-10-CM

## 2025-05-02 RX ORDER — LISINOPRIL 20 MG/1
20 TABLET ORAL DAILY
Qty: 90 TABLET | Refills: 3 | Status: SHIPPED | OUTPATIENT
Start: 2025-05-02

## 2025-05-02 RX ORDER — NYSTATIN AND TRIAMCINOLONE ACETONIDE 100000; 1 [USP'U]/G; MG/G
CREAM TOPICAL
Qty: 180 G | Refills: 3 | Status: SHIPPED | OUTPATIENT
Start: 2025-05-02

## 2025-05-22 DIAGNOSIS — E11.9 TYPE 2 DIABETES MELLITUS WITHOUT COMPLICATION, WITHOUT LONG-TERM CURRENT USE OF INSULIN: ICD-10-CM

## 2025-05-24 LAB
EST. AVERAGE GLUCOSE BLD GHB EST-MCNC: 126 MG/DL
EST. AVERAGE GLUCOSE BLD GHB EST-SCNC: 7 MMOL/L
HBA1C MFR BLD: 6 %

## 2025-05-27 ENCOUNTER — RX ONLY (RX ONLY)
Age: 65
End: 2025-05-27

## 2025-05-27 RX ORDER — MINOXIDIL 2.5 MG/1
TABLET ORAL
Qty: 90 | Refills: 0 | Status: ERX | COMMUNITY
Start: 2025-05-27

## 2025-05-27 RX ORDER — FINASTERIDE 1 MG/1
TABLET, FILM COATED ORAL
Qty: 90 | Refills: 1 | Status: ERX | COMMUNITY
Start: 2025-05-27

## 2025-05-30 ASSESSMENT — ENCOUNTER SYMPTOMS
VISUAL CHANGE: 0
POLYDIPSIA: 0
DIABETIC ASSOCIATED SYMPTOMS: 0

## 2025-05-30 NOTE — PROGRESS NOTES
Patient is sent at the request of Whit Kenyon MD for my opinion regarding Type 2 diabetes and hypertension.  My final recommendations will be communicated back to the requesting provider by way of shared medical record.    Subjective     Diabetes  She presents for her follow-up diabetic visit. She has type 2 diabetes mellitus. The initial diagnosis of diabetes was made 16 years ago. Her disease course has been improving. There are no hypoglycemic associated symptoms. There are no diabetic associated symptoms. Pertinent negatives for diabetes include no polydipsia, no polyuria and no visual change. (Notices more symptoms when she eats higher carbohydrates) There are no hypoglycemic complications. Symptoms are stable. There are no diabetic complications. Risk factors for coronary artery disease include diabetes mellitus, dyslipidemia, hypertension and obesity. Current diabetic treatment includes oral agent (monotherapy) (Mounjaro once weekly). She is compliant with treatment all of the time. Her weight is decreasing steadily. She is following a generally healthy diet. She rarely participates in exercise. Her overall blood glucose range is 130-140 mg/dl. An ACE inhibitor/angiotensin II receptor blocker is being taken. She does not see a podiatrist.Eye exam is current.     At last pharmacy encounter patient continued to report semaj sporadic diarrhea, some mild nausea, and burping with Mounjaro but was managing the side effects. Since her time in range was at goal she was continued on the 7.5 mg dose of Mounjaro.     Updated A1c on 05/23/25 was improved at 6.0%.     Current diet:   No changes reported since last pharmacy encounter  Since increasing Mounjaro up to 7.5 mg states that her appetite has been reduced   Trying to eat plant based with no processed foods   Breakfast: takes some pretzels with her medications in the morning, coffee with half and half creamer, low sugar instant oatmeal at work, will also  "usually have a banana   Wants to try to eat an egg in the morning with her medications instead of pretzels  Lunch: tries to stick to salads with some protein, tries not to use bottled dressing, packs her lunch   Dinner: trying to eat salads as much as she can, will sometimes have sausage with them, kielbasa and sour kraut with mashed potatoes  Snacks: does snack on pretzels in between meals, avoids sweets, sometimes crackers with spread-able cheese  Fluids: water, hot tea  Still has not been eating fast food or out at restaurants  Since last encounter has been eating less carbs and watching what she eats   Has been eating more fresh fruits which can increase blood sugar     Current exercise:   Is trying to work on increasing activity with the warmer weather - walking shorter distances   Has 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 which limits her ability to exercise and walk throughout the day; uses her cane to help while walking     Sodium intake:   Sometimes makes soups with canned black beans and green beans and other vegetables  Snacks on pretzels and crackers  Does not add salt to meals, will sometimes add salt to meals when cooking     Allergies   Allergen Reactions    Doxycycline Hives    Sulfamethoxazole-Trimethoprim Hives    Cephalexin Itching and Rash     Pruritus    Penicillins Headache, Unknown and Rash     rash    headache    rash   headache     Diabetes  The patient does have a known family history of diabetes (mother)     Patient is using: continuous glucose monitor (Codi 3 Plus) to self monitor blood sugar at home   Time in range remained stable from last encounter          Hypoglycemia frequency: 0%   Hypoglycemia awareness: Yes       Objective     Wt Readings from Last 3 Encounters:   03/11/25 88.6 kg (195 lb 6.4 oz)   11/13/24 102 kg (224 lb 9.6 oz)   07/20/24 99.6 kg (219 lb 9.3 oz)     Estimated body mass index is 33.54 kg/m² as calculated from the following:    Height as of 3/11/25: 1.626 m (5' 4\").    " Weight as of 3/11/25: 88.6 kg (195 lb 6.4 oz).    Goal Weight: 150 lbs right now     Patient Reported Home Weights:   12/06/24: 209 lbs   12/30/24: 211 lbs    01/27/25: 202 lbs   02/17/25: 194 lbs   03/10/25: 194 lbs   03/31/25: 187 lbs   06/02/25: 187 lbs     Diabetes Pharmacotherapy:  Metformin 500 mg 2 tablets twice daily   Mounjaro 7.5 mg once weekly (Sundays)  Has 0 pens left at home     Historical Diabetes Pharmacotherapy:   Januvia 100 mg (switched to Mounjaro)  Glyburide 5 mg (stopped due to hypoglycemia)     Adverse Effects:    Still has some sporadic diarrhea - states this could be from Metformin or Mounjaro; has been manageable   Nausea has improved   Notes some minimal irritation around her injection site ~2 days after her dose     Adherence:   States she never misses doses of her medications   Did take one of her Mounjaro doses a couple days later to help with side effects     SECONDARY PREVENTION  Statin? No  ACE-I/ARB? No  Aspirin? No    Pertinent PMH Review:  PMH of Pancreatitis: No  PMH of Retinopathy: No  PMH of Urinary Tract Infections: Yes, does not get frequently   PMH of MTC: No    Medication Reconciliation  Added:   Minoxidil 2.5 mg 1/2 tablet daily   Finasteride 1 mg once daily (recently prescribed but patient has not started taking yet)    Current Outpatient Medications on File Prior to Visit   Medication Sig Dispense Refill    alcohol swabs pads, medicated USE TO CLEANSE SKIN PRIOR TO BLOOD GLUCOSE TESTING TWICE DAILY 200 each 3    atenolol (Tenormin) 25 mg tablet Take 1 tablet (25 mg) by mouth once daily.      blood sugar diagnostic (OneTouch Verio test strips) strip TEST BLOOD GLUCOSE TWICE DAILY. 200 strip 3    blood-glucose sensor (FreeStyle Codi 3 Plus Sensor) device Apply 1 sensor to the back of the arm for continuous monitoring of blood sugar. Remove and apply new sensor every 15 days. 2 each 11    blood-glucose sensor (FreeStyle Codi 3 Sensor) device Apply 1 sensor to the back of  "the arm for continuous glucose monitoring. Remove and apply new sensor every 14 days 6 each 3    clobetasoL-emollient 0.05 % cream APPY AND GENTLY MASSAGE INTO AFFECTED AREA(S) TWICE DAILY AS NEEDED FOR ITCHING 180 g 3    cyanocobalamin (Vitamin B-12) 1,000 mcg/mL injection Inject 1 mL (1,000 mcg) into the muscle every 30 (thirty) days. 3 mL 3    estradiol (Estrace) 0.01 % (0.1 mg/gram) vaginal cream APPLY 0.5 GRAMS VAGINALLY WEEKLY AS DIRECTED 127.5 g 3    fluticasone (Flonase) 50 mcg/actuation nasal spray Administer 2 sprays into each nostril once daily as needed for rhinitis or allergies. 48 g 3    lancets (OneTouch Delica Plus Lancet) 33 gauge misc USE TO TEST TWICE DAILY. 200 each 3    levothyroxine (Synthroid, Levoxyl) 50 mcg tablet Take 1 tablet (50 mcg) by mouth once daily. 90 tablet 3    lisinopril 20 mg tablet Take 1 tablet (20 mg) by mouth once daily. 90 tablet 3    meclizine (Antivert) 12.5 mg tablet TAKE 1-2 TABLETS BY MOUTH EVERY 4-6 HOURS PRN FOR DIZZINESS. CAN CAUSE DROWSINESS. 30 tablet 3    minoxidil (Loniten) 2.5 mg tablet TAKE 1/2 TABLET BY MOUTH ONCE DAILY. STAY HYDRATED      nystatin-triamcinolone (Mycolog II) cream Apply 1 Application topically 3 times a day as needed. 180 g 3    OneTouch Verio Meter misc test as directed      syringe with needle (BD Luer-Adrian Syringe) 3 mL 25 gauge x 1\" syringe USE FOR VITAMIN B12 INJECTIONS ONCE A MONTH 3 each 3    triamterene-hydrochlorothiazid (Maxzide-25) 37.5-25 mg tablet Take 1 tablet by mouth once daily. Take with food. 90 tablet 3    [DISCONTINUED] metFORMIN (Glucophage) 500 mg tablet Take 1 tablet (500 mg) by mouth in the morning and 2 tablets (1,000 mg) in the evening with meals. (Patient taking differently: Take 2 tablets (1,000 mg) by mouth 2 times daily (morning and late afternoon). Take 1 tablet (500 mg) by mouth in the morning and 2 tablets (1,000 mg) in the evening with meals.) 180 tablet 3    [DISCONTINUED] tirzepatide (Mounjaro) 7.5 mg/0.5 mL " pen injector Inject 7.5 mg under the skin 1 (one) time per week. 6 mL 0    ergocalciferol (Vitamin D-2) 1.25 MG (39350 UT) capsule Take 1 capsule (1,250 mcg) by mouth 1 (one) time per week. (Patient not taking: Reported on 6/2/2025) 4 capsule 3    finasteride (Propecia) 1 mg tablet Take 1 tablet (1 mg) by mouth early in the morning.. (Patient not taking: Reported on 6/2/2025)       No current facility-administered medications on file prior to visit.      Lab Review  Lab Results   Component Value Date    BILITOT 0.7 02/22/2025    CALCIUM 11.6 (H) 03/11/2025    CO2 28 02/22/2025    CL 98 02/22/2025    CREATININE 0.89 02/22/2025    GLUCOSE 181 (H) 02/22/2025    ALKPHOS 61 02/22/2025    K 4.4 02/22/2025    PROT 6.5 02/22/2025     02/22/2025    AST 13 02/22/2025    ALT 15 02/22/2025    BUN 18 02/22/2025    ANIONGAP 10 02/22/2025    MG 1.70 04/20/2024    PHOS 3.0 01/30/2021    ALBUMIN 4.3 02/22/2025    AMYLASE 86 12/02/2020    LIPASE 122 (H) 12/02/2020    GFRF 64 08/24/2023    GFRMALE CANCELED 08/24/2023     Lab Results   Component Value Date    TRIG 419 (H) 02/22/2025    CHOL 208 (H) 02/22/2025    LDLCALC  02/22/2025      Comment:         LDL cholesterol not calculated. Triglyceride levels  greater than 400 mg/dL invalidate calculated LDL results.           Reference range: <100     Desirable range <100 mg/dL for primary prevention;    <70 mg/dL for patients with CHD or diabetic patients   with > or = 2 CHD risk factors.     LDL-C is now calculated using the Sudhir-Agustín   calculation, which is a validated novel method providing   better accuracy than the Friedewald equation in the   estimation of LDL-C.   Sudhir HILLIARD et al. KAYLIE. 2013;310(19): 5397-7281   (http://education.[employer].Hoonto/faq/HSY406)      HDL 37 (L) 02/22/2025     Lab Results   Component Value Date    HGBA1C 6.0 (H) 05/23/2025    HGBA1C 7.4 (H) 02/22/2025    HGBA1C 7.6 (H) 08/10/2024     The 10-year ASCVD risk score (Duane ZHOU, et al., 2019)  is: 15.5%    Values used to calculate the score:      Age: 65 years      Sex: Female      Is Non- : No      Diabetic: Yes      Tobacco smoker: No      Systolic Blood Pressure: 118 mmHg      Is BP treated: Yes      HDL Cholesterol: 37 mg/dL      Total Cholesterol: 208 mg/dL    Health Maintenance:   Foot Exam: None  Eye Exam: 03/11/25  Lipid Panel: LDL unable to calculate due to elevated TG,  mg/dL 02/22/25  Urine Albumin: 9 mg/g Crt 02/22/25  Influenza Vaccine: 10/05/24  Pneumonia Vaccine: PPSV23 07/09/13, PCV20 11/13/24    Drug Interactions:  No significant drug-drug interactions exist requiring adjustment to medication therapy       Assessment/Plan   Problem List Items Addressed This Visit       Type 2 diabetes mellitus without complication, without long-term current use of insulin - Primary    Patient's goal A1c is < 7%.  Is pt at goal? Yes, most recent A1c was 6.0% on 05/23/25 which had improved from 7.4% on 02/22/25  Patient's time in range on her Codi has increased from 86% at last follow-up to 89% over the past 4 weeks. This is at goal of >70% time in range.     Rationale for plan:   She continues to report some sporadic diarrhea but it has been manageable. Denies any constipation.   Does note minimal irritation around her injection site which has also been manageable. Informed her this can be normal.   Weight loss has slowed and weight has remained stable over the past month. She would like to titrate up Mounjaro for increased weight loss.     Medication Changes:  INCREASE:   Mounjaro 10 mg under the skin once weekly   CONTINUE:  Metformin 500 mg 1 tablet by mouth twice daily (updated prescription sent to pharmacy for reduced dosing)    Future Considerations:  Will likely continue on the 10 mg dose of Mounjaro for a little while  Could titrate up further to 12.5 mg once weekly for additional weight loss benefits if tolerable to the 10 mg dose    Monitoring and Education:  We  will review updated 4-week Codi report at next follow-up  Next A1c is due at anytime after 11/23/25 (every 6 months if well controlled)  Will assess for any new/worsening side effects with Mounjaro dose increase. Patient informed that new side effects may appear or current side effects may worsen but since she has generally done well hopefully anything new is mild and manageable.     We will plan to follow-up in ~4 weeks to see how she has done with the increased dose of Mounjaro. She was encouraged to reach out with any questions and/or concerns prior to then if needed.          Relevant Medications    tirzepatide (Mounjaro) 10 mg/0.5 mL pen injector    metFORMIN (Glucophage) 500 mg tablet    Other Relevant Orders    Referral to Clinical Pharmacy     Pharmacy Follow-Up: 06/25/2025      PCP Follow-Up: 08/04/2025    Rohit HayesD     Continue all meds under the continuation of care with the referring provider and clinical pharmacy team.

## 2025-06-02 ENCOUNTER — APPOINTMENT (OUTPATIENT)
Dept: PHARMACY | Facility: HOSPITAL | Age: 65
End: 2025-06-02
Payer: COMMERCIAL

## 2025-06-02 ENCOUNTER — PHARMACY VISIT (OUTPATIENT)
Dept: PHARMACY | Facility: CLINIC | Age: 65
End: 2025-06-02
Payer: COMMERCIAL

## 2025-06-02 DIAGNOSIS — E11.9 TYPE 2 DIABETES MELLITUS WITHOUT COMPLICATION, WITHOUT LONG-TERM CURRENT USE OF INSULIN: Primary | ICD-10-CM

## 2025-06-02 PROCEDURE — RXMED WILLOW AMBULATORY MEDICATION CHARGE

## 2025-06-02 RX ORDER — METFORMIN HYDROCHLORIDE 500 MG/1
500 TABLET ORAL 2 TIMES DAILY
Qty: 180 TABLET | Refills: 3 | Status: SHIPPED | OUTPATIENT
Start: 2025-06-02

## 2025-06-02 RX ORDER — TIRZEPATIDE 10 MG/.5ML
10 INJECTION, SOLUTION SUBCUTANEOUS WEEKLY
Qty: 2 ML | Refills: 0 | Status: SHIPPED | OUTPATIENT
Start: 2025-06-02

## 2025-06-02 RX ORDER — MINOXIDIL 2.5 MG/1
TABLET ORAL
COMMUNITY
Start: 2025-05-28

## 2025-06-02 RX ORDER — FINASTERIDE 1 MG/1
1 TABLET, FILM COATED ORAL
COMMUNITY
Start: 2025-05-29

## 2025-06-02 NOTE — ASSESSMENT & PLAN NOTE
Patient's goal A1c is < 7%.  Is pt at goal? Yes, most recent A1c was 6.0% on 05/23/25 which had improved from 7.4% on 02/22/25  Patient's time in range on her Codi has increased from 86% at last follow-up to 89% over the past 4 weeks. This is at goal of >70% time in range.     Rationale for plan:   She continues to report some sporadic diarrhea but it has been manageable. Denies any constipation.   Does note minimal irritation around her injection site which has also been manageable. Informed her this can be normal.   Weight loss has slowed and weight has remained stable over the past month. She would like to titrate up Mounjaro for increased weight loss.     Medication Changes:  INCREASE:   Mounjaro 10 mg under the skin once weekly   CONTINUE:  Metformin 500 mg 1 tablet by mouth twice daily (updated prescription sent to pharmacy for reduced dosing)    Future Considerations:  Will likely continue on the 10 mg dose of Mounjaro for a little while  Could titrate up further to 12.5 mg once weekly for additional weight loss benefits if tolerable to the 10 mg dose    Monitoring and Education:  We will review updated 4-week Codi report at next follow-up  Next A1c is due at anytime after 11/23/25 (every 6 months if well controlled)  Will assess for any new/worsening side effects with Mounjaro dose increase. Patient informed that new side effects may appear or current side effects may worsen but since she has generally done well hopefully anything new is mild and manageable.     We will plan to follow-up in ~4 weeks to see how she has done with the increased dose of Mounjaro. She was encouraged to reach out with any questions and/or concerns prior to then if needed.

## 2025-06-25 ENCOUNTER — APPOINTMENT (OUTPATIENT)
Dept: PHARMACY | Facility: HOSPITAL | Age: 65
End: 2025-06-25
Payer: COMMERCIAL

## 2025-06-25 DIAGNOSIS — E11.9 TYPE 2 DIABETES MELLITUS WITHOUT COMPLICATION, WITHOUT LONG-TERM CURRENT USE OF INSULIN: Primary | ICD-10-CM

## 2025-06-25 PROCEDURE — RXMED WILLOW AMBULATORY MEDICATION CHARGE

## 2025-06-25 RX ORDER — TIRZEPATIDE 10 MG/.5ML
10 INJECTION, SOLUTION SUBCUTANEOUS WEEKLY
Qty: 2 ML | Refills: 0 | Status: SHIPPED | OUTPATIENT
Start: 2025-06-25

## 2025-06-25 ASSESSMENT — ENCOUNTER SYMPTOMS
POLYDIPSIA: 0
VISUAL CHANGE: 0
DIABETIC ASSOCIATED SYMPTOMS: 0

## 2025-06-25 NOTE — ASSESSMENT & PLAN NOTE
Patient's goal A1c is < 7%.  Is pt at goal? Yes, most recent A1c was 6.0% on 05/23/25 which had improved from 7.4% on 02/22/25  Patient's time in range on her Codi has increased from 89% at last follow-up to 95% over the past 4 weeks. This is at goal of >70% time in range.     Rationale for plan:   Today notes that diarrhea and nausea has resolved  Has had some mild constipation with Mounjaro but has generally been tolerating it well and has been manageable with eating prunes   She has lost ~7 lbs over the past month   Will continue on the 10 mg dose of Mounjaro for now and follow-up to see if weight loss is still consistent     Medication Changes:  CONTINUE:  Metformin 500 mg 1 tablet by mouth twice daily (updated prescription sent to pharmacy for reduced dosing)  Mounjaro 10 mg under the skin once weekly     Future Considerations:  Will likely continue on the 10 mg dose of Mounjaro for a little while as long as she continues to lose weight on it  Could titrate up further to 12.5 mg once weekly for additional weight loss benefits if needed    Monitoring and Education:  We will review updated 4-week Codi report at next follow-up  Next A1c is due at anytime after 11/23/25 (every 6 months if well controlled)  Will assess for any new/worsening side effects with Mounjaro. Will also obtain an updated home weight to assess efficacy of therapy for weight loss.     We will plan to follow-up in ~4 weeks to see how she has done continuing with her current medications. She was encouraged to reach out with any questions and/or concerns prior to then if needed.

## 2025-06-25 NOTE — PROGRESS NOTES
Patient is sent at the request of Whit Kenyon MD for my opinion regarding Type 2 diabetes and hypertension.  My final recommendations will be communicated back to the requesting provider by way of shared medical record.    Subjective     Diabetes  She presents for her follow-up diabetic visit. She has type 2 diabetes mellitus. The initial diagnosis of diabetes was made 16 years ago. Her disease course has been stable. There are no hypoglycemic associated symptoms. There are no diabetic associated symptoms. Pertinent negatives for diabetes include no polydipsia, no polyuria and no visual change. (Notices more symptoms when she eats higher carbohydrates) There are no hypoglycemic complications. Symptoms are stable. There are no diabetic complications. Risk factors for coronary artery disease include diabetes mellitus, dyslipidemia, hypertension and obesity. Current diabetic treatment includes oral agent (monotherapy) (Mounjaro once weekly). She is compliant with treatment all of the time. Her weight is decreasing steadily. She is following a generally healthy diet. She rarely participates in exercise. Her overall blood glucose range is 110-130 mg/dl. An ACE inhibitor/angiotensin II receptor blocker is being taken. She does not see a podiatrist.Eye exam is current.     At last pharmacy encounter patient continued to report some sporadic diarrhea, but mentioned that it had been manageable. She is not sure if it is from Metformin or the Mounjaro. Her nausea had also improved. Time in range and updated A1c were both at goal but she wanted to titrate up her Mounjaro as she had noticed her weight loss had slowed so she was increased up to the 10 mg dose. We are following-up today to see how she has tolerated the dose increase.     Current diet:   No changes reported since last pharmacy encounter  Since increasing Mounjaro up to 7.5 mg states that her appetite has been reduced   Trying to eat plant based with no  processed foods   Breakfast: takes some pretzels with her medications in the morning, coffee with half and half creamer, low sugar instant oatmeal at work, will also usually have a banana   Wants to try to eat an egg in the morning with her medications instead of pretzels  Lunch: tries to stick to salads with some protein, tries not to use bottled dressing, packs her lunch   Dinner: trying to eat salads as much as she can, will sometimes have sausage with them, kielbasa and sour kraut with mashed potatoes  Snacks: does snack on pretzels in between meals, avoids sweets, sometimes crackers with spread-able cheese  Fluids: water, hot tea  Still has not been eating fast food or out at restaurants  Since last encounter has been eating less carbs and watching what she eats   Has been eating more fresh fruits which can increase blood sugar     Current exercise:   Has been trying to walk 2 miles per day   Has 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 which limits her ability to exercise and walk throughout the day; uses her cane to help while walking     Sodium intake:   Sometimes makes soups with canned black beans and green beans and other vegetables  Snacks on pretzels and crackers  Does not add salt to meals, will sometimes add salt to meals when cooking     Allergies   Allergen Reactions    Doxycycline Hives    Sulfamethoxazole-Trimethoprim Hives    Cephalexin Itching and Rash     Pruritus    Penicillins Headache, Unknown and Rash     rash    headache    rash   headache     Diabetes  The patient does have a known family history of diabetes (mother)     Patient is using: continuous glucose monitor (Codi 3 Plus) to self monitor blood sugar at home   Time in range remained stable from last encounter          Hypoglycemia frequency: 0%   Hypoglycemia awareness: Yes       Objective     Wt Readings from Last 3 Encounters:   03/11/25 88.6 kg (195 lb 6.4 oz)   11/13/24 102 kg (224 lb 9.6 oz)   07/20/24 99.6 kg (219 lb 9.3 oz)  "    Estimated body mass index is 33.54 kg/m² as calculated from the following:    Height as of 3/11/25: 1.626 m (5' 4\").    Weight as of 3/11/25: 88.6 kg (195 lb 6.4 oz).    Goal Weight: 150 lbs right now     Patient Reported Home Weights:   12/06/24: 209 lbs   12/30/24: 211 lbs    01/27/25: 202 lbs   02/17/25: 194 lbs   03/10/25: 194 lbs   03/31/25: 187 lbs   06/02/25: 187 lbs   06/25/25: 180 lbs     Diabetes Pharmacotherapy:  Metformin 500 mg 2 tablets twice daily   Mounjaro 10 mg once weekly (Sundays)  Has 1 pen left at home     Historical Diabetes Pharmacotherapy:   Januvia 100 mg (switched to Mounjaro)  Glyburide 5 mg (stopped due to hypoglycemia)     Adverse Effects:    States diarrhea and nausea has resolved    Has had some mild constipation but is eating more prunes which she states helps, has been manageable     Adherence:   States she never misses doses of her medications      SECONDARY PREVENTION  Statin? No  ACE-I/ARB? No  Aspirin? No    Pertinent PMH Review:  PMH of Pancreatitis: No  PMH of Retinopathy: No  PMH of Urinary Tract Infections: Yes, does not get frequently   PMH of MTC: No    Medication Reconciliation  No changes reported by patient today      Current Outpatient Medications on File Prior to Visit   Medication Sig Dispense Refill    alcohol swabs pads, medicated USE TO CLEANSE SKIN PRIOR TO BLOOD GLUCOSE TESTING TWICE DAILY 200 each 3    atenolol (Tenormin) 25 mg tablet Take 1 tablet (25 mg) by mouth once daily.      blood sugar diagnostic (OneTouch Verio test strips) strip TEST BLOOD GLUCOSE TWICE DAILY. 200 strip 3    blood-glucose sensor (FreeStyle Codi 3 Plus Sensor) device Apply 1 sensor to the back of the arm for continuous monitoring of blood sugar. Remove and apply new sensor every 15 days. 2 each 11    blood-glucose sensor (FreeStyle Codi 3 Sensor) device Apply 1 sensor to the back of the arm for continuous glucose monitoring. Remove and apply new sensor every 14 days 6 each 3    " "clobetasoL-emollient 0.05 % cream APPY AND GENTLY MASSAGE INTO AFFECTED AREA(S) TWICE DAILY AS NEEDED FOR ITCHING 180 g 3    cyanocobalamin (Vitamin B-12) 1,000 mcg/mL injection Inject 1 mL (1,000 mcg) into the muscle every 30 (thirty) days. 3 mL 3    ergocalciferol (Vitamin D-2) 1.25 MG (89873 UT) capsule Take 1 capsule (1,250 mcg) by mouth 1 (one) time per week. (Patient not taking: Reported on 6/2/2025) 4 capsule 3    estradiol (Estrace) 0.01 % (0.1 mg/gram) vaginal cream APPLY 0.5 GRAMS VAGINALLY WEEKLY AS DIRECTED 127.5 g 3    finasteride (Propecia) 1 mg tablet Take 1 tablet (1 mg) by mouth early in the morning.. (Patient not taking: Reported on 6/2/2025)      fluticasone (Flonase) 50 mcg/actuation nasal spray Administer 2 sprays into each nostril once daily as needed for rhinitis or allergies. 48 g 3    lancets (OneTouch Delica Plus Lancet) 33 gauge misc USE TO TEST TWICE DAILY. 200 each 3    levothyroxine (Synthroid, Levoxyl) 50 mcg tablet Take 1 tablet (50 mcg) by mouth once daily. 90 tablet 3    lisinopril 20 mg tablet Take 1 tablet (20 mg) by mouth once daily. 90 tablet 3    meclizine (Antivert) 12.5 mg tablet TAKE 1-2 TABLETS BY MOUTH EVERY 4-6 HOURS PRN FOR DIZZINESS. CAN CAUSE DROWSINESS. 30 tablet 3    metFORMIN (Glucophage) 500 mg tablet Take 1 tablet (500 mg) by mouth 2 times a day. 180 tablet 3    minoxidil (Loniten) 2.5 mg tablet TAKE 1/2 TABLET BY MOUTH ONCE DAILY. STAY HYDRATED      nystatin-triamcinolone (Mycolog II) cream Apply 1 Application topically 3 times a day as needed. 180 g 3    OneTouch Verio Meter misc test as directed      syringe with needle (BD Luer-Adrian Syringe) 3 mL 25 gauge x 1\" syringe USE FOR VITAMIN B12 INJECTIONS ONCE A MONTH 3 each 3    triamterene-hydrochlorothiazid (Maxzide-25) 37.5-25 mg tablet Take 1 tablet by mouth once daily. Take with food. 90 tablet 3    [DISCONTINUED] tirzepatide (Mounjaro) 10 mg/0.5 mL pen injector Inject 10 mg under the skin 1 (one) time per week. " 2 mL 0     No current facility-administered medications on file prior to visit.      Lab Review  Lab Results   Component Value Date    BILITOT 0.7 02/22/2025    CALCIUM 11.6 (H) 03/11/2025    CO2 28 02/22/2025    CL 98 02/22/2025    CREATININE 0.89 02/22/2025    GLUCOSE 181 (H) 02/22/2025    ALKPHOS 61 02/22/2025    K 4.4 02/22/2025    PROT 6.5 02/22/2025     02/22/2025    AST 13 02/22/2025    ALT 15 02/22/2025    BUN 18 02/22/2025    ANIONGAP 10 02/22/2025    MG 1.70 04/20/2024    PHOS 3.0 01/30/2021    ALBUMIN 4.3 02/22/2025    AMYLASE 86 12/02/2020    LIPASE 122 (H) 12/02/2020    GFRF 64 08/24/2023    GFRMALE CANCELED 08/24/2023     Lab Results   Component Value Date    TRIG 419 (H) 02/22/2025    CHOL 208 (H) 02/22/2025    LDLCALC  02/22/2025      Comment:         LDL cholesterol not calculated. Triglyceride levels  greater than 400 mg/dL invalidate calculated LDL results.           Reference range: <100     Desirable range <100 mg/dL for primary prevention;    <70 mg/dL for patients with CHD or diabetic patients   with > or = 2 CHD risk factors.     LDL-C is now calculated using the Sudhir-Randolph   calculation, which is a validated novel method providing   better accuracy than the Friedewald equation in the   estimation of LDL-C.   Sudhir SS et al. KAYLIE. 2013;310(19): 8516-1163   (http://education.AccurIC.RolePoint/faq/DYB016)      HDL 37 (L) 02/22/2025     Lab Results   Component Value Date    HGBA1C 6.0 (H) 05/23/2025    HGBA1C 7.4 (H) 02/22/2025    HGBA1C 7.6 (H) 08/10/2024     The 10-year ASCVD risk score (Duane ZHOU, et al., 2019) is: 15.5%    Values used to calculate the score:      Age: 65 years      Sex: Female      Is Non- : No      Diabetic: Yes      Tobacco smoker: No      Systolic Blood Pressure: 118 mmHg      Is BP treated: Yes      HDL Cholesterol: 37 mg/dL      Total Cholesterol: 208 mg/dL    Health Maintenance:   Foot Exam: None  Eye Exam: 03/11/25  Lipid Panel:  LDL unable to calculate due to elevated TG,  mg/dL 02/22/25  Urine Albumin: 9 mg/g Crt 02/22/25  Influenza Vaccine: 10/05/24  Pneumonia Vaccine: PPSV23 07/09/13, PCV20 11/13/24    Drug Interactions:  No significant drug-drug interactions exist requiring adjustment to medication therapy       Assessment/Plan   Problem List Items Addressed This Visit       Type 2 diabetes mellitus without complication, without long-term current use of insulin - Primary    Patient's goal A1c is < 7%.  Is pt at goal? Yes, most recent A1c was 6.0% on 05/23/25 which had improved from 7.4% on 02/22/25  Patient's time in range on her Codi has increased from 89% at last follow-up to 95% over the past 4 weeks. This is at goal of >70% time in range.     Rationale for plan:   Today notes that diarrhea and nausea has resolved  Has had some mild constipation with Mounjaro but has generally been tolerating it well and has been manageable with eating prunes   She has lost ~7 lbs over the past month   Will continue on the 10 mg dose of Mounjaro for now and follow-up to see if weight loss is still consistent     Medication Changes:  CONTINUE:  Metformin 500 mg 1 tablet by mouth twice daily (updated prescription sent to pharmacy for reduced dosing)  Mounjaro 10 mg under the skin once weekly     Future Considerations:  Will likely continue on the 10 mg dose of Mounjaro for a little while as long as she continues to lose weight on it  Could titrate up further to 12.5 mg once weekly for additional weight loss benefits if needed    Monitoring and Education:  We will review updated 4-week Codi report at next follow-up  Next A1c is due at anytime after 11/23/25 (every 6 months if well controlled)  Will assess for any new/worsening side effects with Mounjaro. Will also obtain an updated home weight to assess efficacy of therapy for weight loss.     We will plan to follow-up in ~4 weeks to see how she has done continuing with her current medications.  She was encouraged to reach out with any questions and/or concerns prior to then if needed.          Relevant Medications    tirzepatide (Mounjaro) 10 mg/0.5 mL pen injector    Other Relevant Orders    Referral to Clinical Pharmacy     Pharmacy Follow-Up: 07/17/2025    PCP Follow-Up: 08/04/2025    Rohit HayesD     Continue all meds under the continuation of care with the referring provider and clinical pharmacy team.

## 2025-06-28 ENCOUNTER — PHARMACY VISIT (OUTPATIENT)
Dept: PHARMACY | Facility: CLINIC | Age: 65
End: 2025-06-28
Payer: COMMERCIAL

## 2025-07-17 ENCOUNTER — APPOINTMENT (OUTPATIENT)
Dept: PHARMACY | Facility: HOSPITAL | Age: 65
End: 2025-07-17
Payer: COMMERCIAL

## 2025-07-17 ENCOUNTER — PHARMACY VISIT (OUTPATIENT)
Dept: PHARMACY | Facility: CLINIC | Age: 65
End: 2025-07-17
Payer: COMMERCIAL

## 2025-07-17 DIAGNOSIS — E11.65 UNCONTROLLED TYPE 2 DIABETES MELLITUS WITH HYPERGLYCEMIA: ICD-10-CM

## 2025-07-17 DIAGNOSIS — E11.9 TYPE 2 DIABETES MELLITUS WITHOUT COMPLICATION, WITHOUT LONG-TERM CURRENT USE OF INSULIN: Primary | ICD-10-CM

## 2025-07-17 PROCEDURE — RXMED WILLOW AMBULATORY MEDICATION CHARGE

## 2025-07-17 RX ORDER — TIRZEPATIDE 10 MG/.5ML
10 INJECTION, SOLUTION SUBCUTANEOUS WEEKLY
Qty: 6 ML | Refills: 0 | Status: SHIPPED | OUTPATIENT
Start: 2025-07-17

## 2025-07-17 ASSESSMENT — ENCOUNTER SYMPTOMS
POLYDIPSIA: 0
DIABETIC ASSOCIATED SYMPTOMS: 0
VISUAL CHANGE: 0

## 2025-07-17 NOTE — ASSESSMENT & PLAN NOTE
Patient's goal A1c is < 7%.  Is pt at goal? Yes, most recent A1c was 6.0% on 05/23/25 which had improved from 7.4% on 02/22/25  Patient's time in range on her Codi has remained stable at 95%. Average glucose is 127 mg/dL and GMI is 6.3%.     Rationale for plan:   Today notes constipation has resolved and denies any side effects with the medication   Weight has remained stable but she still is having consistent appetite suppression   Will continue on the 10 mg dose of Mounjaro for now - 3 month supply sent in for patient to fill before going on Medicare next month     Medication Changes:  CONTINUE:  Metformin 500 mg 1 tablet by mouth twice daily (updated prescription sent to pharmacy for reduced dosing)  Mounjaro 10 mg under the skin once weekly     Future Considerations:  Will likely continue on the 10 mg dose of Mounjaro for a little while as long as she continues to lose weight on it  Could titrate up further to 12.5 mg once weekly for additional weight loss benefits if needed    Monitoring and Education:  We will review updated 4-week Codi report at next follow-up  Next A1c is due at anytime after 11/23/25 (every 6 months if well controlled)  Will assess for any new/worsening side effects with Mounjaro. Will also obtain an updated home weight to assess efficacy of therapy for weight loss.     We will plan to follow-up in ~4 weeks to see how she has done continuing with her current medications and check on the cost of Mounjaro through her Medicare plan. She was encouraged to reach out with any questions and/or concerns prior to then if needed.

## 2025-07-17 NOTE — PROGRESS NOTES
Patient is sent at the request of Whit Kenyon MD for my opinion regarding Type 2 diabetes and hypertension.  My final recommendations will be communicated back to the requesting provider by way of shared medical record.    Subjective     Diabetes  She presents for her follow-up diabetic visit. She has type 2 diabetes mellitus. The initial diagnosis of diabetes was made 16 years ago. Her disease course has been stable. There are no hypoglycemic associated symptoms. There are no diabetic associated symptoms. Pertinent negatives for diabetes include no polydipsia, no polyuria and no visual change. (Notices more symptoms when she eats higher carbohydrates) There are no hypoglycemic complications. Symptoms are stable. There are no diabetic complications. Risk factors for coronary artery disease include diabetes mellitus, dyslipidemia, hypertension and obesity. Current diabetic treatment includes oral agent (monotherapy) (Mounjaro once weekly). She is compliant with treatment all of the time. Her weight is decreasing steadily. She is following a generally healthy diet. She rarely participates in exercise. Her overall blood glucose range is 110-130 mg/dl. An ACE inhibitor/angiotensin II receptor blocker is being taken. She does not see a podiatrist.Eye exam is current.     At last pharmacy encounter patient reported resolution in nausea and diarrhea, did have some mild constipation with Mounjaro but had generally been tolerating it well and manageable with eating prunes. Since she was continuing to see weight loss she was continued on the 10 mg dose of Mounjaro. We are following-up today to see how she has done over the past month.     Current diet:   No changes reported since last pharmacy encounter  Still noticing appetite suppression with Mounjaro  Trying to eat plant based with no processed foods   Breakfast: takes some pretzels with her medications in the morning, coffee with half and half creamer, low sugar  "instant oatmeal at work, will also usually have a banana   Wants to try to eat an egg in the morning with her medications instead of pretzels  Lunch: tries to stick to salads with some protein, tries not to use bottled dressing, packs her lunch   Dinner: trying to eat salads as much as she can, will sometimes have sausage with them, kielbasa and sour kraut with mashed potatoes  Snacks: does snack on pretzels in between meals, avoids sweets, sometimes crackers with spread-able cheese  Fluids: water, hot tea  Still has not been eating fast food or out at restaurants  Since last encounter has been eating less carbs and watching what she eats   Has been eating more fresh fruits which can increase blood sugar     Current exercise:   Has been trying to walk 2 miles per day   Has 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 which limits her ability to exercise and walk throughout the day; uses her cane to help while walking     Allergies   Allergen Reactions    Doxycycline Hives    Sulfamethoxazole-Trimethoprim Hives    Cephalexin Itching and Rash     Pruritus    Penicillins Headache, Unknown and Rash     rash    headache    rash   headache     Diabetes  The patient does have a known family history of diabetes (mother)     Patient is using: continuous glucose monitor (Codi 3 Plus) to self monitor blood sugar at home   Time in range remained stable from last encounter at 95%          Hypoglycemia frequency: 0%   Hypoglycemia awareness: Yes       Objective     Wt Readings from Last 3 Encounters:   03/11/25 88.6 kg (195 lb 6.4 oz)   11/13/24 102 kg (224 lb 9.6 oz)   07/20/24 99.6 kg (219 lb 9.3 oz)     Estimated body mass index is 33.54 kg/m² as calculated from the following:    Height as of 3/11/25: 1.626 m (5' 4\").    Weight as of 3/11/25: 88.6 kg (195 lb 6.4 oz).    Goal Weight: 150 lbs right now     Patient Reported Home Weights:   12/06/24: 209 lbs   12/30/24: 211 lbs    01/27/25: 202 lbs   02/17/25: 194 lbs   03/10/25: 194 lbs "   03/31/25: 187 lbs   06/02/25: 187 lbs   06/25/25: 180 lbs   07/17/25: 179 lbs     Diabetes Pharmacotherapy:  Metformin 500 mg twice daily   Mounjaro 10 mg once weekly (Sundays)    Historical Diabetes Pharmacotherapy:   Januvia 100 mg (switched to Mounjaro)  Glyburide 5 mg (stopped due to hypoglycemia)     Adverse Effects:    Denies any side effects today, is tolerating the Mounjaro well     Adherence:   States she never misses doses of her medications      SECONDARY PREVENTION  Statin? No  ACE-I/ARB? No  Aspirin? No    Pertinent PMH Review:  PMH of Pancreatitis: No  PMH of Retinopathy: No  PMH of Urinary Tract Infections: Yes, does not get frequently   PMH of MTC: No    Medication Reconciliation  No changes reported by patient today      Current Outpatient Medications on File Prior to Visit   Medication Sig Dispense Refill    alcohol swabs pads, medicated USE TO CLEANSE SKIN PRIOR TO BLOOD GLUCOSE TESTING TWICE DAILY 200 each 3    atenolol (Tenormin) 25 mg tablet Take 1 tablet (25 mg) by mouth once daily.      blood sugar diagnostic (OneTouch Verio test strips) strip TEST BLOOD GLUCOSE TWICE DAILY. 200 strip 3    blood-glucose sensor (FreeStyle Codi 3 Plus Sensor) device Apply 1 sensor to the back of the arm for continuous monitoring of blood sugar. Remove and apply new sensor every 15 days. 2 each 11    blood-glucose sensor (FreeStyle Codi 3 Sensor) device Apply 1 sensor to the back of the arm for continuous glucose monitoring. Remove and apply new sensor every 14 days 6 each 3    clobetasoL-emollient 0.05 % cream APPY AND GENTLY MASSAGE INTO AFFECTED AREA(S) TWICE DAILY AS NEEDED FOR ITCHING 180 g 3    cyanocobalamin (Vitamin B-12) 1,000 mcg/mL injection Inject 1 mL (1,000 mcg) into the muscle every 30 (thirty) days. 3 mL 3    ergocalciferol (Vitamin D-2) 1.25 MG (98802 UT) capsule Take 1 capsule (1,250 mcg) by mouth 1 (one) time per week. (Patient not taking: Reported on 6/2/2025) 4 capsule 3    estradiol  "(Estrace) 0.01 % (0.1 mg/gram) vaginal cream APPLY 0.5 GRAMS VAGINALLY WEEKLY AS DIRECTED 127.5 g 3    finasteride (Propecia) 1 mg tablet Take 1 tablet (1 mg) by mouth early in the morning.. (Patient not taking: Reported on 6/2/2025)      fluticasone (Flonase) 50 mcg/actuation nasal spray Administer 2 sprays into each nostril once daily as needed for rhinitis or allergies. 48 g 3    lancets (OneTouch Delica Plus Lancet) 33 gauge misc USE TO TEST TWICE DAILY. 200 each 3    levothyroxine (Synthroid, Levoxyl) 50 mcg tablet Take 1 tablet (50 mcg) by mouth once daily. 90 tablet 3    lisinopril 20 mg tablet Take 1 tablet (20 mg) by mouth once daily. 90 tablet 3    meclizine (Antivert) 12.5 mg tablet TAKE 1-2 TABLETS BY MOUTH EVERY 4-6 HOURS PRN FOR DIZZINESS. CAN CAUSE DROWSINESS. 30 tablet 3    metFORMIN (Glucophage) 500 mg tablet Take 1 tablet (500 mg) by mouth 2 times a day. 180 tablet 3    minoxidil (Loniten) 2.5 mg tablet TAKE 1/2 TABLET BY MOUTH ONCE DAILY. STAY HYDRATED      nystatin-triamcinolone (Mycolog II) cream Apply 1 Application topically 3 times a day as needed. 180 g 3    OneTouch Verio Meter misc test as directed      syringe with needle (BD Luer-Adrian Syringe) 3 mL 25 gauge x 1\" syringe USE FOR VITAMIN B12 INJECTIONS ONCE A MONTH 3 each 3    triamterene-hydrochlorothiazid (Maxzide-25) 37.5-25 mg tablet Take 1 tablet by mouth once daily. Take with food. 90 tablet 3    [DISCONTINUED] tirzepatide (Mounjaro) 10 mg/0.5 mL pen injector Inject 10 mg under the skin 1 (one) time per week. 2 mL 0     No current facility-administered medications on file prior to visit.      Lab Review  Lab Results   Component Value Date    BILITOT 0.7 02/22/2025    CALCIUM 11.6 (H) 03/11/2025    CO2 28 02/22/2025    CL 98 02/22/2025    CREATININE 0.89 02/22/2025    GLUCOSE 181 (H) 02/22/2025    ALKPHOS 61 02/22/2025    K 4.4 02/22/2025    PROT 6.5 02/22/2025     02/22/2025    AST 13 02/22/2025    ALT 15 02/22/2025    BUN 18 " 02/22/2025    ANIONGAP 10 02/22/2025    MG 1.70 04/20/2024    PHOS 3.0 01/30/2021    ALBUMIN 4.3 02/22/2025    AMYLASE 86 12/02/2020    LIPASE 122 (H) 12/02/2020    GFRF 64 08/24/2023    GFRMALE CANCELED 08/24/2023     Lab Results   Component Value Date    TRIG 419 (H) 02/22/2025    CHOL 208 (H) 02/22/2025    LDLCALC  02/22/2025      Comment:         LDL cholesterol not calculated. Triglyceride levels  greater than 400 mg/dL invalidate calculated LDL results.           Reference range: <100     Desirable range <100 mg/dL for primary prevention;    <70 mg/dL for patients with CHD or diabetic patients   with > or = 2 CHD risk factors.     LDL-C is now calculated using the [place]   calculation, which is a validated novel method providing   better accuracy than the Friedewald equation in the   estimation of LDL-C.   Sudhir SS et al. KAYLIE. 2013;310(19): 7044-4781   (http://education.[employer].Directed Edge/faq/EZA814)      HDL 37 (L) 02/22/2025     Lab Results   Component Value Date    HGBA1C 6.0 (H) 05/23/2025    HGBA1C 7.4 (H) 02/22/2025    HGBA1C 7.6 (H) 08/10/2024     The 10-year ASCVD risk score (Duane ZHOU, et al., 2019) is: 15.5%    Values used to calculate the score:      Age: 65 years      Clincally relevant sex: Female      Is Non- : No      Diabetic: Yes      Tobacco smoker: No      Systolic Blood Pressure: 118 mmHg      Is BP treated: Yes      HDL Cholesterol: 37 mg/dL      Total Cholesterol: 208 mg/dL    Health Maintenance:   Foot Exam: None  Eye Exam: 03/11/25  Lipid Panel: LDL unable to calculate due to elevated TG,  mg/dL 02/22/25  Urine Albumin: 9 mg/g Crt 02/22/25  Influenza Vaccine: 10/05/24  Pneumonia Vaccine: PPSV23 07/09/13, PCV20 11/13/24    Drug Interactions:  No significant drug-drug interactions exist requiring adjustment to medication therapy       Assessment/Plan   Problem List Items Addressed This Visit       Type 2 diabetes mellitus without complication,  without long-term current use of insulin - Primary    Patient's goal A1c is < 7%.  Is pt at goal? Yes, most recent A1c was 6.0% on 05/23/25 which had improved from 7.4% on 02/22/25  Patient's time in range on her Codi has remained stable at 95%. Average glucose is 127 mg/dL and GMI is 6.3%.     Rationale for plan:   Today notes constipation has resolved and denies any side effects with the medication   Weight has remained stable but she still is having consistent appetite suppression   Will continue on the 10 mg dose of Mounjaro for now - 3 month supply sent in for patient to fill before going on Medicare next month     Medication Changes:  CONTINUE:  Metformin 500 mg 1 tablet by mouth twice daily (updated prescription sent to pharmacy for reduced dosing)  Mounjaro 10 mg under the skin once weekly     Future Considerations:  Will likely continue on the 10 mg dose of Mounjaro for a little while as long as she continues to lose weight on it  Could titrate up further to 12.5 mg once weekly for additional weight loss benefits if needed    Monitoring and Education:  We will review updated 4-week Codi report at next follow-up  Next A1c is due at anytime after 11/23/25 (every 6 months if well controlled)  Will assess for any new/worsening side effects with Mounjaro. Will also obtain an updated home weight to assess efficacy of therapy for weight loss.     We will plan to follow-up in ~4 weeks to see how she has done continuing with her current medications and check on the cost of Mounjaro through her Medicare plan. She was encouraged to reach out with any questions and/or concerns prior to then if needed.          Relevant Medications    tirzepatide (Mounjaro) 10 mg/0.5 mL pen injector    Other Relevant Orders    Referral to Clinical Pharmacy     Other Visit Diagnoses         Uncontrolled type 2 diabetes mellitus with hyperglycemia        Relevant Medications    tirzepatide (Mounjaro) 10 mg/0.5 mL pen injector    Other  Relevant Orders    Referral to Clinical Pharmacy          Pharmacy Follow-Up: 08/13/2025   PCP Follow-Up: 08/04/2025    Jorge Miller PharmD     Continue all meds under the continuation of care with the referring provider and clinical pharmacy team.

## 2025-07-22 DIAGNOSIS — I10 PRIMARY HYPERTENSION: ICD-10-CM

## 2025-07-22 RX ORDER — TRIAMTERENE AND HYDROCHLOROTHIAZIDE 37.5; 25 MG/1; MG/1
1 TABLET ORAL DAILY
Qty: 90 TABLET | Refills: 3 | Status: SHIPPED | OUTPATIENT
Start: 2025-07-22

## 2025-08-04 ENCOUNTER — HOSPITAL ENCOUNTER (OUTPATIENT)
Dept: RADIOLOGY | Facility: CLINIC | Age: 65
Discharge: HOME | End: 2025-08-04
Payer: MEDICARE

## 2025-08-04 ENCOUNTER — APPOINTMENT (OUTPATIENT)
Dept: PRIMARY CARE | Facility: CLINIC | Age: 65
End: 2025-08-04
Payer: COMMERCIAL

## 2025-08-04 VITALS
TEMPERATURE: 96.4 F | SYSTOLIC BLOOD PRESSURE: 134 MMHG | DIASTOLIC BLOOD PRESSURE: 78 MMHG | WEIGHT: 181.2 LBS | OXYGEN SATURATION: 100 % | BODY MASS INDEX: 31.1 KG/M2 | HEART RATE: 64 BPM

## 2025-08-04 DIAGNOSIS — E53.8 VITAMIN B12 DEFICIENCY: ICD-10-CM

## 2025-08-04 DIAGNOSIS — E78.49 OTHER HYPERLIPIDEMIA: Primary | ICD-10-CM

## 2025-08-04 DIAGNOSIS — Z13.820 SCREENING FOR OSTEOPOROSIS: ICD-10-CM

## 2025-08-04 DIAGNOSIS — I10 PRIMARY HYPERTENSION: ICD-10-CM

## 2025-08-04 DIAGNOSIS — E03.9 ACQUIRED HYPOTHYROIDISM: ICD-10-CM

## 2025-08-04 DIAGNOSIS — E55.9 VITAMIN D DEFICIENCY: ICD-10-CM

## 2025-08-04 DIAGNOSIS — Z78.0 ASYMPTOMATIC MENOPAUSE: ICD-10-CM

## 2025-08-04 DIAGNOSIS — Z12.31 VISIT FOR SCREENING MAMMOGRAM: ICD-10-CM

## 2025-08-04 DIAGNOSIS — K21.9 GASTROESOPHAGEAL REFLUX DISEASE WITHOUT ESOPHAGITIS: ICD-10-CM

## 2025-08-04 DIAGNOSIS — E11.9 TYPE 2 DIABETES MELLITUS WITHOUT COMPLICATION, WITHOUT LONG-TERM CURRENT USE OF INSULIN: ICD-10-CM

## 2025-08-04 DIAGNOSIS — E21.3 HYPERPARATHYROIDISM (MULTI): ICD-10-CM

## 2025-08-04 DIAGNOSIS — D58.2 ELEVATED HEMOGLOBIN: ICD-10-CM

## 2025-08-04 DIAGNOSIS — R26.89 IMPAIRMENT OF BALANCE: ICD-10-CM

## 2025-08-04 DIAGNOSIS — I10 BENIGN ESSENTIAL HYPERTENSION: ICD-10-CM

## 2025-08-04 PROCEDURE — 77067 SCR MAMMO BI INCL CAD: CPT

## 2025-08-04 PROCEDURE — 1160F RVW MEDS BY RX/DR IN RCRD: CPT | Performed by: INTERNAL MEDICINE

## 2025-08-04 PROCEDURE — 3075F SYST BP GE 130 - 139MM HG: CPT | Performed by: INTERNAL MEDICINE

## 2025-08-04 PROCEDURE — 77080 DXA BONE DENSITY AXIAL: CPT

## 2025-08-04 PROCEDURE — G2211 COMPLEX E/M VISIT ADD ON: HCPCS | Performed by: INTERNAL MEDICINE

## 2025-08-04 PROCEDURE — 99214 OFFICE O/P EST MOD 30 MIN: CPT | Performed by: INTERNAL MEDICINE

## 2025-08-04 PROCEDURE — 3078F DIAST BP <80 MM HG: CPT | Performed by: INTERNAL MEDICINE

## 2025-08-04 PROCEDURE — 1159F MED LIST DOCD IN RCRD: CPT | Performed by: INTERNAL MEDICINE

## 2025-08-04 PROCEDURE — 1036F TOBACCO NON-USER: CPT | Performed by: INTERNAL MEDICINE

## 2025-08-04 PROCEDURE — 4010F ACE/ARB THERAPY RXD/TAKEN: CPT | Performed by: INTERNAL MEDICINE

## 2025-08-04 PROCEDURE — 77067 SCR MAMMO BI INCL CAD: CPT | Performed by: STUDENT IN AN ORGANIZED HEALTH CARE EDUCATION/TRAINING PROGRAM

## 2025-08-04 PROCEDURE — 77063 BREAST TOMOSYNTHESIS BI: CPT | Performed by: STUDENT IN AN ORGANIZED HEALTH CARE EDUCATION/TRAINING PROGRAM

## 2025-08-04 PROCEDURE — 77080 DXA BONE DENSITY AXIAL: CPT | Performed by: STUDENT IN AN ORGANIZED HEALTH CARE EDUCATION/TRAINING PROGRAM

## 2025-08-04 RX ORDER — METFORMIN HYDROCHLORIDE 500 MG/1
500 TABLET ORAL 2 TIMES DAILY
Qty: 180 TABLET | Refills: 3 | Status: SHIPPED | OUTPATIENT
Start: 2025-08-04

## 2025-08-04 RX ORDER — LISINOPRIL 20 MG/1
20 TABLET ORAL DAILY
Qty: 90 TABLET | Refills: 3 | Status: SHIPPED | OUTPATIENT
Start: 2025-08-04

## 2025-08-04 RX ORDER — TRIAMTERENE AND HYDROCHLOROTHIAZIDE 37.5; 25 MG/1; MG/1
1 TABLET ORAL DAILY
Qty: 90 TABLET | Refills: 3 | Status: SHIPPED | OUTPATIENT
Start: 2025-08-04

## 2025-08-04 RX ORDER — ATENOLOL 25 MG/1
25 TABLET ORAL DAILY
Qty: 90 TABLET | Refills: 3 | Status: SHIPPED | OUTPATIENT
Start: 2025-08-04

## 2025-08-04 ASSESSMENT — PATIENT HEALTH QUESTIONNAIRE - PHQ9
2. FEELING DOWN, DEPRESSED OR HOPELESS: NOT AT ALL
1. LITTLE INTEREST OR PLEASURE IN DOING THINGS: NOT AT ALL
SUM OF ALL RESPONSES TO PHQ9 QUESTIONS 1 AND 2: 0

## 2025-08-04 ASSESSMENT — ENCOUNTER SYMPTOMS
HEMATOLOGIC/LYMPHATIC NEGATIVE: 1
EYES NEGATIVE: 1
PSYCHIATRIC NEGATIVE: 1
GASTROINTESTINAL NEGATIVE: 1
RESPIRATORY NEGATIVE: 1
CARDIOVASCULAR NEGATIVE: 1

## 2025-08-04 NOTE — PROGRESS NOTES
"Subjective   Patient ID: Shwetha Madrigal is a 65 y.o. female who presents for Follow-up (Patient is here for a 4 month follow up. ).    Here  to  follow up,she sees Jorge,pharmacist,for her DM,on Mounjaro,lost weight,but has some GI side effect,will not tolerate higher dose.  He self stopped minoxidil for her hair loss because \"she did not feel right on it and she does not like to mix medication\".  She has been taking biotin at times.  Patient stopped vitamin D and vitamin B12 supplement.  She retired 8/1//25,\"very happy\",she has  been walking 3 miles a  day   Here to go over her lab results, A1c was 6.  She has HTN,DM-2,HLD,hypothyroid,fatty liver.  She still has some residual vertigo from her long COVID around 3 years ago, She saw several specialist.  She is asking if nicotine patches or an 80 supplement might help because she sold that online,  She sees ophthalmologist for eye exam.  BI MAMMOGRAM: 7/20/2024, she has an appointment today for her mammogram  COLONOSCOPY: 8/16/2021,was due in 2024(3 years), colonoscopy order done by me last year patient did not go because this did not cover last time it caused her thousands of dollars.  BONE DENSITY: 2/20/2023, she has an appointment today for her mammogram         Review of Systems   Constitutional:         As HPI   HENT: Negative.     Eyes: Negative.    Respiratory: Negative.     Cardiovascular: Negative.    Gastrointestinal: Negative.    Genitourinary: Negative.    Neurological:         As HPI   Hematological: Negative.    Psychiatric/Behavioral: Negative.         Objective   /78 (BP Location: Left arm, Patient Position: Sitting, BP Cuff Size: Adult)   Pulse 64   Temp 35.8 °C (96.4 °F) (Temporal)   Wt 82.2 kg (181 lb 3.2 oz)   SpO2 100%   BMI 31.10 kg/m²     Physical Exam  Constitutional:       General: She is not in acute distress.     Appearance: Normal appearance. She is obese.   HENT:      Head: Normocephalic and atraumatic.     Eyes:      " Extraocular Movements: Extraocular movements intact.      Pupils: Pupils are equal, round, and reactive to light.     Neck:      Vascular: No carotid bruit.     Cardiovascular:      Rate and Rhythm: Normal rate and regular rhythm.      Heart sounds: Normal heart sounds.   Pulmonary:      Effort: Pulmonary effort is normal.      Breath sounds: Normal breath sounds. No wheezing or rhonchi.   Abdominal:      General: Abdomen is flat. Bowel sounds are normal. There is no distension.      Palpations: Abdomen is soft.      Tenderness: There is no abdominal tenderness. There is no guarding.     Musculoskeletal:         General: Normal range of motion.      Cervical back: Normal range of motion and neck supple.      Right lower leg: No edema.      Left lower leg: No edema.     Skin:     General: Skin is warm.     Neurological:      General: No focal deficit present.      Mental Status: She is alert and oriented to person, place, and time.      Cranial Nerves: No cranial nerve deficit.      Coordination: Coordination normal.      Gait: Gait normal.     Psychiatric:         Mood and Affect: Mood normal.         Behavior: Behavior normal.         Assessment/Plan   Problem List Items Addressed This Visit           ICD-10-CM    Vitamin B12 deficiency E53.8    Has been off B12         Type 2 diabetes mellitus without complication, without long-term current use of insulin E11.9    Send on current medication check BMP and A1c.         Relevant Medications    lisinopril 20 mg tablet    metFORMIN (Glucophage) 500 mg tablet    Other Relevant Orders    Basic metabolic panel    Hemoglobin A1c    Vitamin D deficiency E55.9    Has been on vitamin D supplement         Primary hypertension I10    Relevant Medications    lisinopril 20 mg tablet    atenolol (Tenormin) 25 mg tablet    triamterene-hydrochlorothiazid (Maxzide-25) 37.5-25 mg tablet    Other hyperlipidemia - Primary E78.49    Continue low-fat diet order lipid profile.          Relevant Orders    Lipid Panel    Elevated hemoglobin D58.2    Patient was seen in past by hematologist         Esophageal reflux K21.9    Avoid offending food.  Continue antacid.         Hyperparathyroidism (Multi) E21.3    Monitor PTH and calcium.  Was seen by endo in past         Impairment of balance R26.89    Due to long COVID.  There is no evidence that nicotine patches or NAD work for long COVID.         Hypothyroidism E03.9    TSH normal         Relevant Medications    atenolol (Tenormin) 25 mg tablet    Benign essential hypertension I10    Stable on current medication.  Return for welcome to Medicare in 3 months.         Relevant Medications    lisinopril 20 mg tablet    atenolol (Tenormin) 25 mg tablet

## 2025-08-13 ENCOUNTER — APPOINTMENT (OUTPATIENT)
Dept: PHARMACY | Facility: HOSPITAL | Age: 65
End: 2025-08-13
Payer: COMMERCIAL

## 2025-08-13 ENCOUNTER — PHARMACY VISIT (OUTPATIENT)
Dept: PHARMACY | Facility: CLINIC | Age: 65
End: 2025-08-13
Payer: COMMERCIAL

## 2025-08-13 DIAGNOSIS — E11.65 UNCONTROLLED TYPE 2 DIABETES MELLITUS WITH HYPERGLYCEMIA: ICD-10-CM

## 2025-08-13 DIAGNOSIS — E11.9 TYPE 2 DIABETES MELLITUS WITHOUT COMPLICATION, WITHOUT LONG-TERM CURRENT USE OF INSULIN: ICD-10-CM

## 2025-08-13 PROCEDURE — RXMED WILLOW AMBULATORY MEDICATION CHARGE

## 2025-08-13 RX ORDER — IBUPROFEN 200 MG
CAPSULE ORAL
Qty: 100 STRIP | Refills: 3 | Status: SHIPPED | OUTPATIENT
Start: 2025-08-13

## 2025-08-13 RX ORDER — DEXTROSE 4 G
TABLET,CHEWABLE ORAL
Qty: 1 EACH | Refills: 0 | Status: SHIPPED | OUTPATIENT
Start: 2025-08-13

## 2025-08-13 RX ORDER — LANCETS
EACH MISCELLANEOUS
Qty: 100 EACH | Refills: 3 | Status: SHIPPED | OUTPATIENT
Start: 2025-08-13

## 2025-08-13 RX ORDER — TIRZEPATIDE 10 MG/.5ML
10 INJECTION, SOLUTION SUBCUTANEOUS WEEKLY
Qty: 6 ML | Refills: 1 | Status: SHIPPED | OUTPATIENT
Start: 2025-08-13

## 2025-11-11 ENCOUNTER — APPOINTMENT (OUTPATIENT)
Dept: PRIMARY CARE | Facility: CLINIC | Age: 65
End: 2025-11-11
Payer: MEDICARE

## 2026-03-13 ENCOUNTER — APPOINTMENT (OUTPATIENT)
Dept: PRIMARY CARE | Facility: CLINIC | Age: 66
End: 2026-03-13
Payer: COMMERCIAL